# Patient Record
Sex: FEMALE | Race: WHITE | NOT HISPANIC OR LATINO | Employment: FULL TIME | URBAN - METROPOLITAN AREA
[De-identification: names, ages, dates, MRNs, and addresses within clinical notes are randomized per-mention and may not be internally consistent; named-entity substitution may affect disease eponyms.]

---

## 2017-09-14 ENCOUNTER — TRANSCRIBE ORDERS (OUTPATIENT)
Dept: ADMINISTRATIVE | Facility: HOSPITAL | Age: 27
End: 2017-09-14

## 2017-09-14 ENCOUNTER — HOSPITAL ENCOUNTER (OUTPATIENT)
Dept: RADIOLOGY | Facility: HOSPITAL | Age: 27
Discharge: HOME | End: 2017-09-14
Attending: FAMILY MEDICINE
Payer: COMMERCIAL

## 2017-09-14 DIAGNOSIS — M25.572 LEFT ANKLE PAIN, UNSPECIFIED CHRONICITY: Primary | ICD-10-CM

## 2017-09-14 DIAGNOSIS — M25.572 LEFT ANKLE PAIN, UNSPECIFIED CHRONICITY: ICD-10-CM

## 2017-09-14 PROCEDURE — 73630 X-RAY EXAM OF FOOT: CPT

## 2017-09-14 PROCEDURE — 73610 X-RAY EXAM OF ANKLE: CPT

## 2018-10-05 ENCOUNTER — APPOINTMENT (OUTPATIENT)
Dept: URGENT CARE | Facility: CLINIC | Age: 28
End: 2018-10-05

## 2018-10-05 DIAGNOSIS — Z02.1 PRE-EMPLOYMENT HEALTH SCREENING EXAMINATION: Primary | ICD-10-CM

## 2018-10-05 PROCEDURE — 86765 RUBEOLA ANTIBODY: CPT | Performed by: PHYSICIAN ASSISTANT

## 2018-10-05 PROCEDURE — 86480 TB TEST CELL IMMUN MEASURE: CPT | Performed by: PHYSICIAN ASSISTANT

## 2018-10-05 PROCEDURE — 86735 MUMPS ANTIBODY: CPT | Performed by: PHYSICIAN ASSISTANT

## 2018-10-05 PROCEDURE — 86762 RUBELLA ANTIBODY: CPT | Performed by: PHYSICIAN ASSISTANT

## 2018-10-06 LAB — RUBV IGG SERPL IA-ACNC: >175 IU/ML

## 2018-10-08 LAB
GAMMA INTERFERON BACKGROUND BLD IA-ACNC: 0.12 IU/ML
M TB IFN-G BLD-IMP: NEGATIVE
M TB IFN-G CD4+ BCKGRND COR BLD-ACNC: -0.02 IU/ML
M TB IFN-G CD4+ BCKGRND COR BLD-ACNC: 0.04 IU/ML
MITOGEN IGNF BCKGRD COR BLD-ACNC: >10 IU/ML

## 2018-10-09 LAB
MEV IGG SER QL: NORMAL
MUV IGG SER QL: NORMAL

## 2018-12-18 ENCOUNTER — OFFICE VISIT (OUTPATIENT)
Dept: URGENT CARE | Facility: CLINIC | Age: 28
End: 2018-12-18
Payer: COMMERCIAL

## 2018-12-18 VITALS
OXYGEN SATURATION: 98 % | TEMPERATURE: 100.8 F | SYSTOLIC BLOOD PRESSURE: 106 MMHG | DIASTOLIC BLOOD PRESSURE: 80 MMHG | WEIGHT: 130 LBS | RESPIRATION RATE: 16 BRPM | HEART RATE: 94 BPM

## 2018-12-18 DIAGNOSIS — J02.9 SORE THROAT: Primary | ICD-10-CM

## 2018-12-18 LAB — S PYO AG THROAT QL: NEGATIVE

## 2018-12-18 PROCEDURE — S9088 SERVICES PROVIDED IN URGENT: HCPCS | Performed by: NURSE PRACTITIONER

## 2018-12-18 PROCEDURE — 99203 OFFICE O/P NEW LOW 30 MIN: CPT | Performed by: NURSE PRACTITIONER

## 2018-12-18 PROCEDURE — 87070 CULTURE OTHR SPECIMN AEROBIC: CPT | Performed by: NURSE PRACTITIONER

## 2018-12-19 NOTE — PROGRESS NOTES
Assessment/Plan    Sore throat [J02 9]  1  Sore throat  POCT rapid strepA    Throat culture     - rapid strep is negative  - will send swab out for culture  - in the meantime to continue using OTC med for pain and lozenge such as Chloraseptic p r n   - will call if positive swab with prescription of antibiotic        Subjective:  Presents to clinic for sore throat     Patient ID: Maurizio Foreman is a 29 y o  female  Reason For Visit / Chief Complaint  Chief Complaint   Patient presents with    Sore Throat     c/o fatigue last week, scratchy throat over w/e; awoke this am to severe sore throat  Alt'd acetaminophen and ibuprofen RTC today         HPI  She states she has been having sore throat x1 week  He denies any nausea or vomiting  Has had mild fever  Temp at the clinic today 100 8 degrees  No other symptoms  Has used OTC med for pain  History reviewed  No pertinent past medical history  Past Surgical History:   Procedure Laterality Date    TONSILLECTOMY         History reviewed  No pertinent family history  Review of Systems   Constitutional: Positive for chills  Negative for fatigue  HENT: Positive for sore throat  Negative for congestion, postnasal drip, rhinorrhea and trouble swallowing  Respiratory: Negative for chest tightness, shortness of breath and wheezing  Cardiovascular: Negative for chest pain and palpitations  Objective:    /80   Pulse 94   Temp (!) 100 8 °F (38 2 °C) (Tympanic Core)   Resp 16   Wt 59 kg (130 lb)   SpO2 98%     Physical Exam   HENT:   Right Ear: External ear normal    Left Ear: External ear normal    Mouth/Throat: No oropharyngeal exudate  Mild to moderate erythema of the general throat area  No whitish exudate  Oral mucosa within normal limits  Cardiovascular: Normal rate, regular rhythm and normal heart sounds  Pulmonary/Chest: Effort normal and breath sounds normal  No respiratory distress     Lymphadenopathy:     She has no cervical adenopathy

## 2018-12-19 NOTE — PATIENT INSTRUCTIONS

## 2018-12-21 LAB — BACTERIA THROAT CULT: NORMAL

## 2018-12-31 ENCOUNTER — TELEPHONE (OUTPATIENT)
Dept: NEUROLOGY | Facility: CLINIC | Age: 28
End: 2018-12-31

## 2019-02-11 NOTE — PROGRESS NOTES
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 [employer] with a past medical history significant for hypothyroidism, bipolar versus anxiety/depression, chronic pain syndrome, insomnia referred here for evaluation of headache/migraines  She previously followed it [facility] and is switching insurances  Neurologic assessment reveals normal neurological exam     Chronic migraine without aura  She reports headaches/migraines since young age  She denies aura and reports typical associated migrainous features  Currently greater than 15 migraine days per month, lasting greater than 5 hours, typically up to a day      Has tried and failed multiple preventatives including:  - Inderal/propranolol 20 mg b i d - no benefit and caused hypotension  - Gabapentin- caused extreme irritability/anxiety  - Tegretol- helped for short time with mood but not migraines then stopped  - Trileptal- had a skin reaction/concern for [employer] versus allergy  - also of no benefit:  Butterbur, magnesium, riboflavin  - currently on and failed re migraines: topiramate, fluoxetine, olanzapine, bupropion  - had Botox from 2017 to 2018, had significant improvement with a decrease in over 10 migraines per month, now switched insurances, and migraines have gone back to greater than 15 days per month    Preventative:  - will plan for botox once prior auth complete  - discussed headache hygiene and lifestyle factors to try and improve headaches/migraines  - continue topiramate, will attempt to increase again gradually to 50 mg BID while waiting for Botox, increase in the past caused word-finding difficulty, patient aware, discussed other possible side effects and risks    Abortive:  - continue sumatriptan 100 mg, discussed proper use, possible side effects and risks  - she takes ibuprofen 600 mg b i d  Maximum 3 days a week  - future abortive options:  could try dexamethasone 2 mg p o  Daily with breakfast for 5 days, Depakote 500 mg p o  Q h s  For 3 nights followed by 250 mg for 2 nights  - past:  Rizatriptan-ineffective, Imitrex nasal spray-made her vomit, naproxen, Excedrin, Fioricet, Nucynta, Acetaminophen, Skelaxin        Patient instructions      - try and have MRI Brain results faxed to us     Headache/migraine treatment:   Abortive medications (for immediate treatment of a headache): It is ok to take ibuprofen, acetaminophen or naproxen (Advil, Tylenol,  Aleve, Excedrin) if they help your headaches you should limit these to No more than 3 times a week to avoid medication overuse/rebound headaches  For your more moderate to severe migraines take this medication early: Imitrex/sumatriptan 100 mg tabs   - take one at the onset of headache  May repeat one time after 1-2 hours if pain has not resolved  (Max 2 a day and 9 a month)     Prescription preventive medications for headaches/migraines   (to take every day to help prevent headaches - not to take at the time of headache):  [x] Increase to topiramate to 25 mg in am and 50 mg for 1 week and then increase to 50 mg BID     Self-Monitoring:  [x] Headache calendar  Each day jason a number from 0-10 indicating if there was a headache and how bad it was  This can be used to monitor gradual improvement and is helpful to make medication adjustments  You can do this on paper or there is an TOM for a smart phone called "Migraine e Diary"  Lifestyle Recommendations:  [x] SLEEP - Maintain a regular sleep schedule: Adults need at least 7-8 hours of uninterrupted a night   Maintain good sleep hygiene:  Going to bed and waking up at consistent times, avoiding excessive daytime naps, avoiding caffeinated beverages in the evening, avoid excessive stimulation in the evening and generally using bed primarily for sleeping  One hour before bedtime would recommend turning lights down lower, decreasing your activity (may read quietly, listen to music at a low volume)  When you get into bed, should eliminate all technology (no texting, emailing, playing with your phone, iPad or tablet in bed)  [x] HYDRATION - Maintain good hydration  Drink  2L of fluid a day (4 typical small water bottles)  [x] DIET - Maintain good nutrition  In particular don't skip meals and try and eat healthy balanced meals regularly  [x] TRIGGERS - Look for other triggers and avoid them: Limit caffeine to 1-2 cups a day or less  Avoid dietary triggers that you have noticed bring on your headaches (this could include aged cheese, peanuts, MSG, aspartame and nitrates)  [x] EXERCISE - physical exercise as we all know is good for you in many ways, and not only is good for your heart, but also is beneficial for your mental health, cognitive health and  chronic pain/headaches  I would encourage at the least 5 days of physical exercise weekly for at least 30 minutes  Education and Follow-up  [x] Please call with any questions or concerns  [x] Follow up with another provider for botox and then with me 6 weeks after for botox follow up     CC:   We had the pleasure of evaluating Maria Isabel Goodman in neurological consultation today  Janae Randolph is a 29 y o    left  handed female who presents today for evaluation of headaches  History of Present Illness:   Past medical history significant for hypothyroidism, bipolar 1, migraines, chronic pain syndrome, insomnia     What is your current pain level - 2    Headaches started at what age? 9years old  How often do the headaches occur? Greater than 15 migraine days per month  5 per week  What time of the day do the headaches start?  Wakes with  How long do the headaches last?  Greater than 5 hr, typically All day  Are you ever headache free? yes    Aura? without aura    Describe your usual headache pain quality? Throbbing, pulsating, pressure, searing, tight band, shooting, stabbing  Where is your headache located? Will start the central forehead -behind eye brows and temples (right or left) and may spread to the posterior head region-occipital and bilateral shoulders  What is the intensity of pain? 8  Associated symptoms:   [x] Nausea       [] Vomiting        [] Diarrhea  [] Insomnia    [] Stiff or sore neck   [x] Problems with concentration  [x] Photophobia     [x]Phonophobia      [] Osmophobia  [] Blurred vision   [x] Prefer quiet, dark room  [x] Light-headed or dizzy     [] Tinnitus   [x] Hands or feet tingle or feel numb/paresthesias  - not always associated with headache     [] Red ear      [] Ptosis      [] Facial droop  [] Lacrimation  [] Nasal congestion/rhinorrhea   [] Flushing of face  [] Change in pupil size      Number of days missed per month because of headaches:  Work (or school) days:  0, but occasionally needs to leave early  Social or Family activities: 4    Things that make the headache worse? Bearing down, concentrating to hard, clenching jaw    Headache triggers:  Menses, stress, too much / too little caffeine, weather changes, missing meals, exercise, related to sleep, fatigue  What time of the year do headaches occur more frequently?   do not seem to be related to any time of the year    Have you seen someone else for headaches or pain? Yes, C/ Darius Gonzalez 41   Have you had trigger point injection performed and how often? No  Have you had Botox injection performed and how often? Yes - see above  Have you had epidural injections or transforaminal injections performed? No  Have you used CBD or THC for your headaches and how often? No  Are you current pregnant or planning on getting pregnant? No, has mirena  Have you ever had any Brain imaging?   Last MRI Brain - 2014 - totally normal     What medications do you take or have you taken for your headaches? ABORTIVE:    Ibuprofen 600 mg - BID - 3 days a week   Sumatriptan 100 mg - 1-2 times a week     Past:    Rizatriptan 10 mg -ineffective  imitrex nasal spray made her vomit   Naproxen  Excedrin  Fioricet  Nucynta  Acetaminophen  Skelaxin    PREVENTIVE:     Topiramate 50 mg q h s -started 09/2016, 10/2017 increase to 25 mg q a m  -50 mg q p m  - had some word finding difficulty     Botox-starting 11/2017, last 11/29/2018 - improved significantly on botox in the past, decreased by over 10 migraines per month    Has never tried aimovig or other CGRPs  depakote has not tried     Past:    Inderal 20 b i d - no benefit and bottomed out BP  Butterbur-no benefit  B2, Magnesium - no benefit  Gabapentin-extreme irritability/anxiety  Tegretol-help for short time with mood then stopped  Trileptal-had a skin reaction/concern for [employer] versus allergy    Alternative therapies used in the past for headaches? physical therapy and massage no help    Neck pain?: chronic neck and shoulder tension     LIFESTYLE  Sleep -history of insomnia  - Ambien in the past   - sometimes benadryl   Problems falling asleep?:   Yes  Problems staying asleep?:  No    Physical activity: no time  Water: 2 glasses a day   Caffeine:  40 oz coffee each day, 12 oz of soda      Mood:   Bipolar 1?  Vs depression/anxiety, ADHD  - follows with psychiatrist, not counselor     Wellbutrin/bupropion 200 mg  Symbyax - Olanzapine and fluoxetine 3-25 mg  ADHD - adderall 30 mg       Past : Valium 5 mg    The following portions of the patient's history were reviewed and updated as appropriate: allergies, current medications, past family history, past medical history, past social history, past surgical history and problem list     Past Medical History:     Past Medical History:   Diagnosis Date    Migraines        Patient Active Problem List   Diagnosis    Bipolar 1 disorder, mixed ([facility] [address] )    Chronic migraine without aura with status migrainosus, not intractable    Chronic pain disorder    Menstrual migraine without status migrainosus, not intractable    Primary insomnia       Medications:      No current outpatient medications on file  No current facility-administered medications for this visit  Allergies: Allergies   Allergen Reactions    Oxcarbazepine Hives     Hives       Family History:   Family history of headaches:  migraine headaches in mother 2 maternal aunts, maternal grandma  Paternal aunt and grandmother   Any family history of aneurysms - No  parternal grandfather parkinsons  No family history on file      Social History:   Work: PA with Psychiatry   Education: PA [employer]  Lives with [employer]     Illicit Drugs: denies  Alcohol/tobacco: Denies tobacco use, alcohol intake: social drinker    Social History     Socioeconomic History    Marital status: Single     Spouse name: Not on file    Number of children: Not on file    Years of education: Not on file    Highest education level: Not on file   Occupational History    Not on file   Social Needs    Financial resource strain: Not on file    Food insecurity:     Worry: Not on file     Inability: Not on file    Transportation needs:     Medical: Not on file     Non-medical: Not on file   Tobacco Use    Smoking status: Former Smoker     Types: E-Cigarettes, Cigarettes    Smokeless tobacco: Never Used   Substance and Sexual Activity    Alcohol use: Not on file    Drug use: Not on file    Sexual activity: Not on file   Lifestyle    Physical activity:     Days per week: Not on file     Minutes per session: Not on file    Stress: Not on file   Relationships    Social connections:     Talks on phone: Not on file     Gets together: Not on file     Attends [employer] service: Not on file     Active member of club or organization: Not on file     Attends meetings of clubs or organizations: Not on file     Relationship status: Not on file    Intimate partner violence:     Fear of current or ex partner: Not on file     Emotionally abused: Not on file     Physically abused: Not on file     Forced sexual activity: Not on file   Other Topics Concern    Not on file   Social History Narrative    Not on file         Objective:     Physical Exam:                                                                 Vitals:            Constitutional:    /68 (BP Location: Left arm, Patient Position: Sitting, Cuff Size: Standard)   Pulse 95   Ht 5' (1 524 m)   Wt 59 9 kg (132 lb)   BMI 25 78 kg/m²   BP Readings from Last 3 Encounters:   02/12/19 110/68   12/18/18 106/80     Pulse Readings from Last 3 Encounters:   02/12/19 95   12/18/18 94         Well developed, well nourished, well groomed  No dysmorphic features  HEENT:  Normocephalic atraumatic  No meningismus  Oropharynx is clear and moist  No oral mucosal lesions  Chest:  Respirations regular and unlabored  Cardiovascular:  Regular rate, intact distal pulses  Distal extremities warm without palpable edema or tenderness, no observed significant swelling  Musculoskeletal:  Full range of motion  (see below under neurologic exam for evaluation of motor function and gait)   Skin:  warm and dry, not diaphoretic  No apparent birthmarks or stigmata of neurocutaneous disease  Psychiatric:  Normal behavior and appropriate affect        Neurological Examination:     Mental status/cognitive function:   Orientated to time, place and person  Recent and remote memory intact  Attention span and concentration as well as fund of knowledge are appropriate for age  Normal language and spontaneous speech  Cranial Nerves:  II-visual fields full  Fundi poorly visualized due to pupillary constriction (1/2019 - dilated fundus exam normal)  III, IV, VI-Pupils were equal, round, and reactive to light and accomodation   Extraocular movements were full and conjugate without nystagmus  conjugate gaze, normal smooth pursuits, normal saccades   V-facial sensation symmetric  VII-facial expression symmetric, intact forehead wrinkle, strong eye closure, symmetric smile    VIII-hearing grossly intact bilaterally   IX, X-palate elevation symmetric, no dysarthria  XI-shoulder shrug strength intact    XII-tongue protrusion midline  Motor Exam: symmetric bulk and tone throughout, no pronator drift  Power/strength 5/5 bilateral upper and lower extremities, no atrophy, fasciculations or abnormal movements noted  Sensory: grossly intact light touch in all extremities  Reflexes: brachioradialis 2+, biceps 2+, knee 3+ (symmetric), ankle 2+ bilaterally  No ankle clonus  Coordination: Finger nose finger intact bilaterally, no apparent dysmetria, ataxia or tremor noted  Gait: steady casual and tandem gait  Romberg Negative  Pertinent lab results:    No routine labs in system  Per patient Basic labs 11 or 12/2018 - normal, including LFTs    Imaging:   No head imaging in system  Last MRI Brain 2014 normal per patient        Review of Systems:   ROS obtained by medical assistant Personally reviewed and updated if indicated  Review of Systems   Constitutional: Negative  Negative for appetite change and fever  HENT: Negative  Negative for hearing loss, tinnitus, trouble swallowing and voice change  Eyes: Negative for photophobia and pain  Eye pain   Respiratory: Negative  Negative for shortness of breath  Cardiovascular: Negative  Negative for palpitations  Gastrointestinal: Negative  Negative for nausea and vomiting  Endocrine: Negative  Negative for cold intolerance and heat intolerance  Genitourinary: Positive for frequency and urgency  Negative for dysuria  Musculoskeletal: Positive for back pain, myalgias and neck pain  Joint pain  Pain while walking   Skin: Negative  Negative for rash     Neurological: Positive for dizziness, numbness and headaches  Negative for tremors, seizures, syncope, facial asymmetry, speech difficulty, weakness and light-headedness  Memory problem  Facial numbness  Twitching  Trouble falling asleep  Balance problem  Clumsiness  confusion   Hematological: Bruises/bleeds easily  Psychiatric/Behavioral: Negative  Negative for confusion, hallucinations and sleep disturbance  I have spent 60 minutes with Patient  today in which greater than 50% of this time was spent in counseling/coordination of care regarding Prognosis, Risks and benefits of tx options, Intructions for management, Importance of tx compliance, Risk factor reductions and Impressions        Author:  Cait Simms MD 2/12/2019 8:32 AM

## 2019-02-12 ENCOUNTER — OFFICE VISIT (OUTPATIENT)
Dept: NEUROLOGY | Facility: CLINIC | Age: 29
End: 2019-02-12
Payer: COMMERCIAL

## 2019-02-12 VITALS
HEIGHT: 60 IN | SYSTOLIC BLOOD PRESSURE: 110 MMHG | BODY MASS INDEX: 25.91 KG/M2 | WEIGHT: 132 LBS | HEART RATE: 95 BPM | DIASTOLIC BLOOD PRESSURE: 68 MMHG

## 2019-02-12 DIAGNOSIS — G43.709 CHRONIC MIGRAINE WITHOUT AURA WITHOUT STATUS MIGRAINOSUS, NOT INTRACTABLE: Primary | ICD-10-CM

## 2019-02-12 PROBLEM — G43.829 MENSTRUAL MIGRAINE WITHOUT STATUS MIGRAINOSUS, NOT INTRACTABLE: Status: ACTIVE | Noted: 2017-01-04

## 2019-02-12 PROCEDURE — 99244 OFF/OP CNSLTJ NEW/EST MOD 40: CPT | Performed by: PSYCHIATRY & NEUROLOGY

## 2019-02-12 RX ORDER — TOPIRAMATE 50 MG/1
TABLET, FILM COATED ORAL
COMMUNITY
Start: 2019-01-08 | End: 2019-02-12 | Stop reason: SDUPTHER

## 2019-02-12 RX ORDER — SUMATRIPTAN 100 MG/1
100 TABLET, FILM COATED ORAL ONCE AS NEEDED
Qty: 27 TABLET | Refills: 1 | Status: SHIPPED | OUTPATIENT
Start: 2019-02-12 | End: 2019-10-23 | Stop reason: SDUPTHER

## 2019-02-12 RX ORDER — TOPIRAMATE 50 MG/1
TABLET, FILM COATED ORAL
Qty: 180 TABLET | Refills: 1 | Status: SHIPPED | OUTPATIENT
Start: 2019-02-12 | End: 2019-05-15 | Stop reason: SDUPTHER

## 2019-02-12 RX ORDER — BUPROPION HYDROCHLORIDE 300 MG/1
300 TABLET ORAL
COMMUNITY
End: 2019-05-14 | Stop reason: ALTCHOICE

## 2019-02-12 RX ORDER — LEVOTHYROXINE SODIUM 0.07 MG/1
TABLET ORAL
COMMUNITY
Start: 2019-01-28 | End: 2019-05-14 | Stop reason: ALTCHOICE

## 2019-02-12 RX ORDER — DEXTROAMPHETAMINE SACCHARATE, AMPHETAMINE ASPARTATE MONOHYDRATE, DEXTROAMPHETAMINE SULFATE AND AMPHETAMINE SULFATE 7.5; 7.5; 7.5; 7.5 MG/1; MG/1; MG/1; MG/1
CAPSULE, EXTENDED RELEASE ORAL
COMMUNITY
Start: 2019-02-11 | End: 2020-03-27 | Stop reason: ALTCHOICE

## 2019-02-12 RX ORDER — SUMATRIPTAN 100 MG/1
TABLET, FILM COATED ORAL
COMMUNITY
Start: 2019-01-08 | End: 2019-02-12

## 2019-02-12 RX ORDER — OLANZAPINE AND FLUOXETINE 3; 25 MG/1; MG/1
1 CAPSULE ORAL EVERY EVENING
Status: ON HOLD | COMMUNITY
End: 2022-06-01

## 2019-02-12 RX ORDER — OMEGA-3 FATTY ACIDS/FISH OIL 300-1000MG
800 CAPSULE ORAL EVERY 6 HOURS PRN
Status: ON HOLD | COMMUNITY
Start: 2006-08-23 | End: 2022-06-01

## 2019-02-12 NOTE — PROGRESS NOTES
Review of Systems   Constitutional: Negative  Negative for appetite change and fever  HENT: Negative  Negative for hearing loss, tinnitus, trouble swallowing and voice change  Eyes: Negative for photophobia and pain  Eye pain   Respiratory: Negative  Negative for shortness of breath  Cardiovascular: Negative  Negative for palpitations  Gastrointestinal: Negative  Negative for nausea and vomiting  Endocrine: Negative  Negative for cold intolerance and heat intolerance  Genitourinary: Positive for frequency and urgency  Negative for dysuria  Musculoskeletal: Positive for back pain, myalgias and neck pain  Joint pain  Pain while walking   Skin: Negative  Negative for rash  Neurological: Positive for dizziness, numbness and headaches  Negative for tremors, seizures, syncope, facial asymmetry, speech difficulty, weakness and light-headedness  Memory problem  Facial numbness  Twitching  Trouble falling asleep  Balance problem  Clumsiness  confusion   Hematological: Bruises/bleeds easily  Psychiatric/Behavioral: Negative  Negative for confusion, hallucinations and sleep disturbance

## 2019-02-12 NOTE — PATIENT INSTRUCTIONS
- try and have MRI Brain results faxed to us     Headache/migraine treatment:   Abortive medications (for immediate treatment of a headache): It is ok to take ibuprofen, acetaminophen or naproxen (Advil, Tylenol,  Aleve, Excedrin) if they help your headaches you should limit these to No more than 3 times a week to avoid medication overuse/rebound headaches  For your more moderate to severe migraines take this medication early: Imitrex/sumatriptan 100 mg tabs   - take one at the onset of headache  May repeat one time after 1-2 hours if pain has not resolved  (Max 2 a day and 9 a month)     Prescription preventive medications for headaches/migraines   (to take every day to help prevent headaches - not to take at the time of headache):  [x] Increase to topiramate to 25 mg in am and 50 mg for 1 week and then increase to 50 mg BID     Self-Monitoring:  [x] Headache calendar  Each day jason a number from 0-10 indicating if there was a headache and how bad it was  This can be used to monitor gradual improvement and is helpful to make medication adjustments  You can do this on paper or there is an TOM for a smart phone called "Migraine e Diary"  Lifestyle Recommendations:  [x] SLEEP - Maintain a regular sleep schedule: Adults need at least 7-8 hours of uninterrupted a night  Maintain good sleep hygiene:  Going to bed and waking up at consistent times, avoiding excessive daytime naps, avoiding caffeinated beverages in the evening, avoid excessive stimulation in the evening and generally using bed primarily for sleeping  One hour before bedtime would recommend turning lights down lower, decreasing your activity (may read quietly, listen to music at a low volume)  When you get into bed, should eliminate all technology (no texting, emailing, playing with your phone, iPad or tablet in bed)  [x] HYDRATION - Maintain good hydration    Drink  2L of fluid a day (4 typical small water bottles)  [x] DIET - Maintain good nutrition  In particular don't skip meals and try and eat healthy balanced meals regularly  [x] TRIGGERS - Look for other triggers and avoid them: Limit caffeine to 1-2 cups a day or less  Avoid dietary triggers that you have noticed bring on your headaches (this could include aged cheese, peanuts, MSG, aspartame and nitrates)  [x] EXERCISE - physical exercise as we all know is good for you in many ways, and not only is good for your heart, but also is beneficial for your mental health, cognitive health and  chronic pain/headaches  I would encourage at the least 5 days of physical exercise weekly for at least 30 minutes  Education and Follow-up  [x] Please call with any questions or concerns     [x] Follow up with another provider for botox and then with me 6 weeks after for botox follow up

## 2019-02-13 ENCOUNTER — TELEPHONE (OUTPATIENT)
Dept: NEUROLOGY | Facility: CLINIC | Age: 29
End: 2019-02-13

## 2019-02-13 NOTE — TELEPHONE ENCOUNTER
----- Message from Radha Mathews sent at 2019  9:54 AM EST -----  Regarding: reschedule  Hi :)    Please reschedule patient with Dr Alecia Elder  Patient is a new start botox      Please advise    Thank you

## 2019-02-13 NOTE — TELEPHONE ENCOUNTER
Called patient lmom awaiting a call back- I was calling to reschedule the patient's Botox appointment  Patient is a new start and will need to be scheduled with Dr Juan Miguel Flores  Can offer the patient an appointment on 3/27/19  When patient calls back I can schedule- [place]

## 2019-02-18 NOTE — TELEPHONE ENCOUNTER
Left message for patient to call back to [facility]ressa Antoine  Spots held on 3/13/19 in [facility]

## 2019-02-18 NOTE — TELEPHONE ENCOUNTER
Referral Notes   Number of Notes: 1   Type Date User Summary Attachment   General 02/18/2019  8:46 AM Jennifer Rios care coordination -   Note    Auth # [id number] valid till 10/01/2019     Please use our stock      Thank you

## 2019-02-28 NOTE — TELEPHONE ENCOUNTER
Patient called back and changed appt for 3/27/19 at 330 pm in [facility] location  Mailing appt card to home address  Patient very thankful for a sooner appt

## 2019-03-05 ENCOUNTER — TELEPHONE (OUTPATIENT)
Dept: NEUROLOGY | Facility: CLINIC | Age: 29
End: 2019-03-05

## 2019-03-05 NOTE — TELEPHONE ENCOUNTER
Called patient and left a message to call back to reschedule her Botox appointment  Since the insurance referral starts 04/01/19 we can not schedule before then  I have 05/01/19 on hold in [facility]

## 2019-03-15 NOTE — TELEPHONE ENCOUNTER
Called patient and left a message informing her of her appointment on 04/17/19 at 3:30pm in the [facility] Location with Dr Zayda Cruz  I will also mail out an appointment card as well

## 2019-03-25 NOTE — TELEPHONE ENCOUNTER
Available spots with Dr Susi Rogel next week  Called patient to offer sooner BINJ  Appt on 4/3/19 and 4/4/19 at 330 pm in [facility] held for this patient  Left message for her to call ASAP to change appt

## 2019-04-03 ENCOUNTER — PROCEDURE VISIT (OUTPATIENT)
Dept: NEUROLOGY | Facility: CLINIC | Age: 29
End: 2019-04-03
Payer: COMMERCIAL

## 2019-04-03 VITALS — DIASTOLIC BLOOD PRESSURE: 74 MMHG | HEART RATE: 76 BPM | TEMPERATURE: 97.7 F | SYSTOLIC BLOOD PRESSURE: 124 MMHG

## 2019-04-03 DIAGNOSIS — G43.701 CHRONIC MIGRAINE WITHOUT AURA WITH STATUS MIGRAINOSUS, NOT INTRACTABLE: Primary | ICD-10-CM

## 2019-04-03 PROCEDURE — 64615 CHEMODENERV MUSC MIGRAINE: CPT | Performed by: PSYCHIATRY & NEUROLOGY

## 2019-05-13 ENCOUNTER — TELEPHONE (OUTPATIENT)
Dept: NEUROLOGY | Facility: CLINIC | Age: 29
End: 2019-05-13

## 2019-05-13 RX ORDER — BUPROPION HYDROCHLORIDE 150 MG/1
150 TABLET, EXTENDED RELEASE ORAL 2 TIMES DAILY
COMMUNITY
Start: 2019-04-08

## 2019-05-13 RX ORDER — FLUCONAZOLE 150 MG/1
TABLET ORAL
COMMUNITY
Start: 2019-03-18 | End: 2019-05-14 | Stop reason: ALTCHOICE

## 2019-05-13 RX ORDER — DIAZEPAM 5 MG/1
5 TABLET ORAL EVERY 6 HOURS PRN
COMMUNITY
Start: 2019-04-20

## 2019-05-14 ENCOUNTER — OFFICE VISIT (OUTPATIENT)
Dept: FAMILY MEDICINE CLINIC | Facility: CLINIC | Age: 29
End: 2019-05-14
Payer: COMMERCIAL

## 2019-05-14 VITALS
HEIGHT: 60 IN | DIASTOLIC BLOOD PRESSURE: 70 MMHG | WEIGHT: 127.6 LBS | HEART RATE: 68 BPM | BODY MASS INDEX: 25.05 KG/M2 | SYSTOLIC BLOOD PRESSURE: 120 MMHG

## 2019-05-14 DIAGNOSIS — K58.1 IRRITABLE BOWEL SYNDROME WITH CONSTIPATION: ICD-10-CM

## 2019-05-14 DIAGNOSIS — R73.9 HYPERGLYCEMIA: ICD-10-CM

## 2019-05-14 DIAGNOSIS — E03.9 ACQUIRED HYPOTHYROIDISM: Primary | ICD-10-CM

## 2019-05-14 DIAGNOSIS — E78.2 MIXED HYPERLIPIDEMIA: ICD-10-CM

## 2019-05-14 PROCEDURE — 99203 OFFICE O/P NEW LOW 30 MIN: CPT | Performed by: PHYSICIAN ASSISTANT

## 2019-05-14 RX ORDER — FEXOFENADINE HCL 180 MG/1
180 TABLET ORAL DAILY
Status: ON HOLD | COMMUNITY
End: 2022-06-01

## 2019-05-14 RX ORDER — POLYETHYLENE GLYCOL 3350 17 G/17G
17 POWDER, FOR SOLUTION ORAL DAILY
COMMUNITY

## 2019-05-14 RX ORDER — LEVOTHYROXINE SODIUM 0.1 MG/1
TABLET ORAL
COMMUNITY
End: 2019-05-31 | Stop reason: SDUPTHER

## 2019-05-15 ENCOUNTER — OFFICE VISIT (OUTPATIENT)
Dept: NEUROLOGY | Facility: CLINIC | Age: 29
End: 2019-05-15
Payer: COMMERCIAL

## 2019-05-15 VITALS
WEIGHT: 127 LBS | HEIGHT: 60 IN | DIASTOLIC BLOOD PRESSURE: 74 MMHG | HEART RATE: 106 BPM | BODY MASS INDEX: 24.94 KG/M2 | SYSTOLIC BLOOD PRESSURE: 128 MMHG

## 2019-05-15 DIAGNOSIS — G44.86 CERVICOGENIC HEADACHE: Primary | ICD-10-CM

## 2019-05-15 DIAGNOSIS — G43.701 CHRONIC MIGRAINE WITHOUT AURA WITH STATUS MIGRAINOSUS, NOT INTRACTABLE: ICD-10-CM

## 2019-05-15 DIAGNOSIS — F31.60 BIPOLAR 1 DISORDER, MIXED (HCC): ICD-10-CM

## 2019-05-15 DIAGNOSIS — G43.709 CHRONIC MIGRAINE WITHOUT AURA WITHOUT STATUS MIGRAINOSUS, NOT INTRACTABLE: ICD-10-CM

## 2019-05-15 DIAGNOSIS — G43.829 MENSTRUAL MIGRAINE WITHOUT STATUS MIGRAINOSUS, NOT INTRACTABLE: ICD-10-CM

## 2019-05-15 PROCEDURE — 99215 OFFICE O/P EST HI 40 MIN: CPT | Performed by: PHYSICIAN ASSISTANT

## 2019-05-15 RX ORDER — KETOROLAC TROMETHAMINE 10 MG/1
10 TABLET, FILM COATED ORAL EVERY 6 HOURS PRN
Qty: 10 TABLET | Refills: 0 | Status: SHIPPED | OUTPATIENT
Start: 2019-05-15 | End: 2020-02-04

## 2019-05-15 RX ORDER — CYCLOBENZAPRINE HCL 5 MG
10 TABLET ORAL
Qty: 180 TABLET | Refills: 0 | Status: SHIPPED | OUTPATIENT
Start: 2019-05-15 | End: 2019-10-23 | Stop reason: SDUPTHER

## 2019-05-15 RX ORDER — DEXAMETHASONE 1 MG
1 TABLET ORAL
Qty: 10 TABLET | Refills: 0 | Status: SHIPPED | OUTPATIENT
Start: 2019-05-15 | End: 2020-02-04

## 2019-05-15 RX ORDER — TOPIRAMATE 50 MG/1
50 TABLET, FILM COATED ORAL 2 TIMES DAILY
Qty: 180 TABLET | Refills: 1 | Status: SHIPPED | OUTPATIENT
Start: 2019-05-15 | End: 2019-10-23 | Stop reason: SDUPTHER

## 2019-05-15 RX ORDER — PROCHLORPERAZINE MALEATE 10 MG
10 TABLET ORAL EVERY 6 HOURS PRN
Qty: 10 TABLET | Refills: 0 | Status: SHIPPED | OUTPATIENT
Start: 2019-05-15 | End: 2020-03-27 | Stop reason: ALTCHOICE

## 2019-05-17 ENCOUNTER — OFFICE VISIT (OUTPATIENT)
Dept: OBGYN CLINIC | Facility: CLINIC | Age: 29
End: 2019-05-17
Payer: COMMERCIAL

## 2019-05-17 VITALS
SYSTOLIC BLOOD PRESSURE: 106 MMHG | WEIGHT: 126.4 LBS | HEIGHT: 60 IN | BODY MASS INDEX: 24.81 KG/M2 | DIASTOLIC BLOOD PRESSURE: 58 MMHG

## 2019-05-17 DIAGNOSIS — Z01.419 ENCOUNTER FOR GYNECOLOGICAL EXAMINATION (GENERAL) (ROUTINE) WITHOUT ABNORMAL FINDINGS: Primary | ICD-10-CM

## 2019-05-17 DIAGNOSIS — Z12.4 SCREENING FOR CERVICAL CANCER: ICD-10-CM

## 2019-05-17 PROCEDURE — G0145 SCR C/V CYTO,THINLAYER,RESCR: HCPCS | Performed by: OBSTETRICS & GYNECOLOGY

## 2019-05-17 PROCEDURE — 99385 PREV VISIT NEW AGE 18-39: CPT | Performed by: OBSTETRICS & GYNECOLOGY

## 2019-05-18 ENCOUNTER — APPOINTMENT (OUTPATIENT)
Dept: LAB | Facility: CLINIC | Age: 29
End: 2019-05-18
Payer: COMMERCIAL

## 2019-05-18 LAB
ALBUMIN SERPL BCP-MCNC: 3.6 G/DL (ref 3.5–5)
ALP SERPL-CCNC: 43 U/L (ref 46–116)
ALT SERPL W P-5'-P-CCNC: 17 U/L (ref 12–78)
ANION GAP SERPL CALCULATED.3IONS-SCNC: 5 MMOL/L (ref 4–13)
AST SERPL W P-5'-P-CCNC: 11 U/L (ref 5–45)
BASOPHILS # BLD AUTO: 0.04 THOUSANDS/ΜL (ref 0–0.1)
BASOPHILS NFR BLD AUTO: 1 % (ref 0–1)
BILIRUB SERPL-MCNC: 0.3 MG/DL (ref 0.2–1)
BUN SERPL-MCNC: 10 MG/DL (ref 5–25)
CALCIUM SERPL-MCNC: 8.2 MG/DL (ref 8.3–10.1)
CHLORIDE SERPL-SCNC: 113 MMOL/L (ref 100–108)
CHOLEST SERPL-MCNC: 186 MG/DL (ref 50–200)
CO2 SERPL-SCNC: 24 MMOL/L (ref 21–32)
CREAT SERPL-MCNC: 0.95 MG/DL (ref 0.6–1.3)
EOSINOPHIL # BLD AUTO: 0.16 THOUSAND/ΜL (ref 0–0.61)
EOSINOPHIL NFR BLD AUTO: 3 % (ref 0–6)
ERYTHROCYTE [DISTWIDTH] IN BLOOD BY AUTOMATED COUNT: 11.5 % (ref 11.6–15.1)
EST. AVERAGE GLUCOSE BLD GHB EST-MCNC: 91 MG/DL
GFR SERPL CREATININE-BSD FRML MDRD: 81 ML/MIN/1.73SQ M
GLUCOSE P FAST SERPL-MCNC: 83 MG/DL (ref 65–99)
HBA1C MFR BLD: 4.8 % (ref 4.2–6.3)
HCT VFR BLD AUTO: 37.8 % (ref 34.8–46.1)
HDLC SERPL-MCNC: 58 MG/DL (ref 40–60)
HGB BLD-MCNC: 13 G/DL (ref 11.5–15.4)
IMM GRANULOCYTES # BLD AUTO: 0.02 THOUSAND/UL (ref 0–0.2)
IMM GRANULOCYTES NFR BLD AUTO: 0 % (ref 0–2)
LDLC SERPL CALC-MCNC: 118 MG/DL (ref 0–100)
LYMPHOCYTES # BLD AUTO: 1.8 THOUSANDS/ΜL (ref 0.6–4.47)
LYMPHOCYTES NFR BLD AUTO: 36 % (ref 14–44)
MCH RBC QN AUTO: 31.4 PG (ref 26.8–34.3)
MCHC RBC AUTO-ENTMCNC: 34.4 G/DL (ref 31.4–37.4)
MCV RBC AUTO: 91 FL (ref 82–98)
MONOCYTES # BLD AUTO: 0.33 THOUSAND/ΜL (ref 0.17–1.22)
MONOCYTES NFR BLD AUTO: 7 % (ref 4–12)
NEUTROPHILS # BLD AUTO: 2.6 THOUSANDS/ΜL (ref 1.85–7.62)
NEUTS SEG NFR BLD AUTO: 53 % (ref 43–75)
NONHDLC SERPL-MCNC: 128 MG/DL
NRBC BLD AUTO-RTO: 0 /100 WBCS
PLATELET # BLD AUTO: 225 THOUSANDS/UL (ref 149–390)
PMV BLD AUTO: 12.4 FL (ref 8.9–12.7)
POTASSIUM SERPL-SCNC: 4 MMOL/L (ref 3.5–5.3)
PROT SERPL-MCNC: 6.5 G/DL (ref 6.4–8.2)
RBC # BLD AUTO: 4.14 MILLION/UL (ref 3.81–5.12)
SODIUM SERPL-SCNC: 142 MMOL/L (ref 136–145)
T4 FREE SERPL-MCNC: 0.94 NG/DL (ref 0.76–1.46)
TRIGL SERPL-MCNC: 52 MG/DL
TSH SERPL DL<=0.05 MIU/L-ACNC: 4.91 UIU/ML (ref 0.36–3.74)
WBC # BLD AUTO: 4.95 THOUSAND/UL (ref 4.31–10.16)

## 2019-05-18 PROCEDURE — 85025 COMPLETE CBC W/AUTO DIFF WBC: CPT | Performed by: PHYSICIAN ASSISTANT

## 2019-05-18 PROCEDURE — 83036 HEMOGLOBIN GLYCOSYLATED A1C: CPT | Performed by: PHYSICIAN ASSISTANT

## 2019-05-18 PROCEDURE — 80061 LIPID PANEL: CPT | Performed by: PHYSICIAN ASSISTANT

## 2019-05-18 PROCEDURE — 36415 COLL VENOUS BLD VENIPUNCTURE: CPT | Performed by: PHYSICIAN ASSISTANT

## 2019-05-18 PROCEDURE — 84439 ASSAY OF FREE THYROXINE: CPT | Performed by: PHYSICIAN ASSISTANT

## 2019-05-18 PROCEDURE — 84443 ASSAY THYROID STIM HORMONE: CPT | Performed by: PHYSICIAN ASSISTANT

## 2019-05-18 PROCEDURE — 80053 COMPREHEN METABOLIC PANEL: CPT | Performed by: PHYSICIAN ASSISTANT

## 2019-05-20 ENCOUNTER — TELEPHONE (OUTPATIENT)
Dept: OBGYN CLINIC | Facility: CLINIC | Age: 29
End: 2019-05-20

## 2019-05-21 DIAGNOSIS — E83.51 HYPOCALCEMIA: ICD-10-CM

## 2019-05-21 DIAGNOSIS — E03.9 ACQUIRED HYPOTHYROIDISM: Primary | ICD-10-CM

## 2019-05-23 LAB
LAB AP GYN PRIMARY INTERPRETATION: NORMAL
Lab: NORMAL

## 2019-05-28 ENCOUNTER — TELEPHONE (OUTPATIENT)
Dept: OBGYN CLINIC | Facility: CLINIC | Age: 29
End: 2019-05-28

## 2019-05-29 ENCOUNTER — PROCEDURE VISIT (OUTPATIENT)
Dept: OBGYN CLINIC | Facility: CLINIC | Age: 29
End: 2019-05-29
Payer: COMMERCIAL

## 2019-05-29 VITALS — DIASTOLIC BLOOD PRESSURE: 66 MMHG | WEIGHT: 127.8 LBS | SYSTOLIC BLOOD PRESSURE: 112 MMHG | BODY MASS INDEX: 24.96 KG/M2

## 2019-05-29 DIAGNOSIS — L70.9 ACNE, UNSPECIFIED ACNE TYPE: ICD-10-CM

## 2019-05-29 DIAGNOSIS — Z30.433 ENCOUNTER FOR IUD REMOVAL AND REINSERTION: Primary | ICD-10-CM

## 2019-05-29 DIAGNOSIS — Z32.02 PREGNANCY TEST NEGATIVE: ICD-10-CM

## 2019-05-29 LAB — SL AMB POCT URINE HCG: NORMAL

## 2019-05-29 PROCEDURE — 58301 REMOVE INTRAUTERINE DEVICE: CPT | Performed by: PHYSICIAN ASSISTANT

## 2019-05-29 PROCEDURE — 58300 INSERT INTRAUTERINE DEVICE: CPT | Performed by: PHYSICIAN ASSISTANT

## 2019-05-29 PROCEDURE — 81025 URINE PREGNANCY TEST: CPT | Performed by: PHYSICIAN ASSISTANT

## 2019-05-29 RX ORDER — SPIRONOLACTONE 50 MG/1
50 TABLET, FILM COATED ORAL DAILY
Qty: 90 TABLET | Refills: 12 | Status: SHIPPED | OUTPATIENT
Start: 2019-05-29 | End: 2020-02-04

## 2019-05-29 RX ORDER — SPIRONOLACTONE 50 MG/1
50 TABLET, FILM COATED ORAL DAILY
Qty: 90 TABLET | Refills: 12 | Status: SHIPPED | OUTPATIENT
Start: 2019-05-29 | End: 2019-05-29 | Stop reason: SDUPTHER

## 2019-05-31 DIAGNOSIS — E03.9 ACQUIRED HYPOTHYROIDISM: Primary | ICD-10-CM

## 2019-05-31 RX ORDER — LEVOTHYROXINE SODIUM 0.1 MG/1
100 TABLET ORAL DAILY
Qty: 90 TABLET | Refills: 0 | Status: SHIPPED | OUTPATIENT
Start: 2019-05-31 | End: 2019-09-12 | Stop reason: SDUPTHER

## 2019-06-24 ENCOUNTER — DOCUMENTATION (OUTPATIENT)
Dept: NEUROLOGY | Facility: CLINIC | Age: 29
End: 2019-06-24

## 2019-07-10 ENCOUNTER — OFFICE VISIT (OUTPATIENT)
Dept: OBGYN CLINIC | Facility: CLINIC | Age: 29
End: 2019-07-10
Payer: COMMERCIAL

## 2019-07-10 VITALS — DIASTOLIC BLOOD PRESSURE: 70 MMHG | BODY MASS INDEX: 25.55 KG/M2 | SYSTOLIC BLOOD PRESSURE: 108 MMHG | WEIGHT: 130.8 LBS

## 2019-07-10 DIAGNOSIS — Z30.431 INTRAUTERINE DEVICE SURVEILLANCE: Primary | ICD-10-CM

## 2019-07-10 PROCEDURE — 99212 OFFICE O/P EST SF 10 MIN: CPT | Performed by: PHYSICIAN ASSISTANT

## 2019-07-10 NOTE — PROGRESS NOTES
Pt here for string check following Kyleena insertion  She has had some irregular spotting but no period since  She has no questions or concerns

## 2019-07-10 NOTE — PROGRESS NOTES
Assessment/Plan:    Intrauterine device surveillance  IUD strings visible  To call w/ any heavy bleeding, sig pelvic pain, expulsion of device  F/u PRN         Diagnoses and all orders for this visit:    Intrauterine device surveillance        Subjective:      Patient ID: Pamella Veras is a 34 y o  female  Pt presents for IUD surveillance  S/p Beckey [place] insertion (changed from [place] IUD)  denies any irreg bleeding, cramping   Happy w/ this contra option and wishes to continue          The following portions of the patient's history were reviewed and updated as appropriate: allergies, current medications, past family history, past medical history, past social history, past surgical history and problem list     Review of Systems   Constitutional: Negative for appetite change, fatigue and unexpected weight change  Respiratory: Negative for chest tightness and shortness of breath  Cardiovascular: Negative for chest pain, palpitations and leg swelling  Gastrointestinal: Negative for abdominal pain, constipation, diarrhea, nausea and vomiting  Genitourinary: Negative for difficulty urinating, dyspareunia, menstrual problem, pelvic pain and vaginal discharge  Musculoskeletal: Negative for arthralgias and myalgias  Neurological: Negative for dizziness, light-headedness and headaches  Psychiatric/Behavioral: Negative for dysphoric mood  The patient is not nervous/anxious  All other systems reviewed and are negative  Objective:      /70 (BP Location: Right arm, Patient Position: Sitting, Cuff Size: Standard)   Wt 59 3 kg (130 lb 12 8 oz)   BMI 25 55 kg/m²          Physical Exam   Constitutional: She is oriented to person, place, and time  She appears well-developed and well-nourished  HENT:   Head: Normocephalic and atraumatic  Abdominal: Soft  There is no tenderness  Hernia confirmed negative in the right inguinal area and confirmed negative in the left inguinal area     Genitourinary: Vagina normal and uterus normal  Pelvic exam was performed with patient supine  There is no rash, tenderness, lesion or injury on the right labia  There is no rash, tenderness, lesion or injury on the left labia  Cervix exhibits no motion tenderness, no discharge and no friability  Right adnexum displays no mass, no tenderness and no fullness  Left adnexum displays no mass, no tenderness and no fullness  No erythema, tenderness or bleeding in the vagina  No signs of injury around the vagina  No vaginal discharge found  Genitourinary Comments: IUD strings visible   Neurological: She is alert and oriented to person, place, and time  Skin: Skin is warm and dry  Psychiatric: She has a normal mood and affect  Nursing note and vitals reviewed

## 2019-07-17 ENCOUNTER — PROCEDURE VISIT (OUTPATIENT)
Dept: NEUROLOGY | Facility: CLINIC | Age: 29
End: 2019-07-17
Payer: COMMERCIAL

## 2019-07-17 VITALS — TEMPERATURE: 99.6 F | SYSTOLIC BLOOD PRESSURE: 134 MMHG | HEART RATE: 106 BPM | DIASTOLIC BLOOD PRESSURE: 78 MMHG

## 2019-07-17 DIAGNOSIS — G43.701 CHRONIC MIGRAINE WITHOUT AURA WITH STATUS MIGRAINOSUS, NOT INTRACTABLE: Primary | ICD-10-CM

## 2019-07-17 PROCEDURE — 64615 CHEMODENERV MUSC MIGRAINE: CPT | Performed by: PHYSICIAN ASSISTANT

## 2019-07-17 NOTE — PROGRESS NOTES
Chemodenervation  Date/Time: 7/17/2019 3:23 PM  Performed by: Roxy Lay PA-C  Authorized by: Roxy Lay PA-C     Pre-procedure details:     Prepped With: Alcohol    Anesthesia (see MAR for exact dosages): Anesthesia method:  None  Procedure details:     Position:  Upright  Botox:     Botox Type:  Type A    Brand:  Botox    mL's of Botulinum Toxin:  200    Final Concentration per CC:  200 units    Needle Gauge:  30 G 2 5 inch  Procedures:     Botox Procedures: chronic headache      Indications: migraines    Injection Location:     Head / Face:  L superior cervical paraspinal, R superior cervical paraspinal, L , R , L frontalis, R frontalis, L medial occipitalis, R medial occipitalis, procerus, R temporalis, L temporalis, R superior trapezius and L superior trapezius    L  injection amount:  5 unit(s)    R  injection amount:  5 unit(s)    L lateral frontalis:  5 unit(s)    R lateral frontalis:  5 unit(s)    L medial frontalis:  5 unit(s)    R medial frontalis:  5 unit(s)    L temporalis injection amount:  20 unit(s)    R temporalis injection amount:  20 unit(s)    Procerus injection amount:  5 unit(s)    L medial occipitalis injection amount:  15 unit(s)    R medial occipitalis injection amount:  15 unit(s)    L superior cervical paraspinal injection amount:  10 unit(s)    R superior cervical paraspinal injection amount:  10 unit(s)    L superior trapezius injection amount:  15 unit(s)    R superior trapezius injection amount:  15 unit(s)  Total Units:     Total units used:  200    Total units discarded:  0  Post-procedure details:     Chemodenervation:  Chronic migraine    Facial Nerve Location[de-identified]  Bilateral facial nerve    Patient tolerance of procedure:   Tolerated well, no immediate complications  Comments:      5 units orbicularis oculi bilaterally  35 units temporalis  All medically necessary

## 2019-07-22 ENCOUNTER — OFFICE VISIT (OUTPATIENT)
Dept: URGENT CARE | Facility: CLINIC | Age: 29
End: 2019-07-22
Payer: COMMERCIAL

## 2019-07-22 VITALS
WEIGHT: 131 LBS | TEMPERATURE: 98.6 F | DIASTOLIC BLOOD PRESSURE: 85 MMHG | OXYGEN SATURATION: 97 % | HEIGHT: 60 IN | BODY MASS INDEX: 25.72 KG/M2 | RESPIRATION RATE: 18 BRPM | SYSTOLIC BLOOD PRESSURE: 129 MMHG | HEART RATE: 98 BPM

## 2019-07-22 DIAGNOSIS — N39.0 URINARY TRACT INFECTION WITHOUT HEMATURIA, SITE UNSPECIFIED: Primary | ICD-10-CM

## 2019-07-22 LAB
SL AMB  POCT GLUCOSE, UA: NEGATIVE
SL AMB LEUKOCYTE ESTERASE,UA: ABNORMAL
SL AMB POCT BILIRUBIN,UA: NEGATIVE
SL AMB POCT BLOOD,UA: ABNORMAL
SL AMB POCT CLARITY,UA: CLEAR
SL AMB POCT COLOR,UA: YELLOW
SL AMB POCT KETONES,UA: NEGATIVE
SL AMB POCT NITRITE,UA: NEGATIVE
SL AMB POCT PH,UA: 5
SL AMB POCT SPECIFIC GRAVITY,UA: 1.03
SL AMB POCT URINE PROTEIN: NEGATIVE
SL AMB POCT UROBILINOGEN: ABNORMAL

## 2019-07-22 PROCEDURE — S9088 SERVICES PROVIDED IN URGENT: HCPCS | Performed by: PHYSICIAN ASSISTANT

## 2019-07-22 PROCEDURE — 99213 OFFICE O/P EST LOW 20 MIN: CPT | Performed by: PHYSICIAN ASSISTANT

## 2019-07-22 PROCEDURE — 81002 URINALYSIS NONAUTO W/O SCOPE: CPT | Performed by: PHYSICIAN ASSISTANT

## 2019-07-22 PROCEDURE — 87186 SC STD MICRODIL/AGAR DIL: CPT | Performed by: PHYSICIAN ASSISTANT

## 2019-07-22 PROCEDURE — 87077 CULTURE AEROBIC IDENTIFY: CPT | Performed by: PHYSICIAN ASSISTANT

## 2019-07-22 PROCEDURE — 87086 URINE CULTURE/COLONY COUNT: CPT | Performed by: PHYSICIAN ASSISTANT

## 2019-07-22 RX ORDER — SULFAMETHOXAZOLE AND TRIMETHOPRIM 800; 160 MG/1; MG/1
1 TABLET ORAL EVERY 12 HOURS SCHEDULED
Qty: 6 TABLET | Refills: 0 | Status: SHIPPED | OUTPATIENT
Start: 2019-07-22 | End: 2019-07-25

## 2019-07-22 NOTE — PATIENT INSTRUCTIONS
Take antibiotic as directed  Recommend yogurt/probiotic for GI side effects  Follow up with PCP in 3-5 days if no improvement  Go to ER if chest pain or difficulty breathing develop

## 2019-07-22 NOTE — PROGRESS NOTES
Assessment/Plan    Urinary tract infection without hematuria, site unspecified [N39 0]  1  Urinary tract infection without hematuria, site unspecified  POCT urine dip    sulfamethoxazole-trimethoprim (BACTRIM DS) 800-160 mg per tablet     Take antibiotic as directed  Recommend yogurt/probiotic for GI side effects  Follow up with PCP in 3-5 days if no improvement  Go to ER if chest pain or difficulty breathing develop    Subjective:     Patient ID: Joleen Peters is a 34 y o  female  Reason For Visit / Chief Complaint  Chief Complaint   Patient presents with    Urinary Urgency     last week started with pressure, today increased urgency, burning and incomplete voiding  Patient presents with complaint of urinary frequency x 1 week  Pt states that the urgency, dysuria, and pressure worsened today  Pt denies history of UTIs and denies known allergies to antibiotics  Pt denies fever, chills, night sweats, chest pain, SOB, abdominal pain, nausea, vomiting, and back/flank pain  Pt denies trying any palliative measures      Past Medical History:   Diagnosis Date    Anxiety     Depression     Migraines        Past Surgical History:   Procedure Laterality Date    INGUINAL HERNIA REPAIR  10/1998    REDUCTION MAMMAPLASTY  03/2013    TONSILLECTOMY  06/2011    TONSILLECTOMY      WISDOM TOOTH EXTRACTION         Family History   Problem Relation Age of Onset    Colon cancer Mother     Arthritis Mother     Thyroid disease Mother     Depression Mother     Anxiety disorder Mother     Clotting disorder Mother     Hyperlipidemia Mother     Vesicoureteral reflux Mother     Irritable bowel syndrome Mother    Taye Davis Migraines Mother     Skin cancer Mother     Thyroid disease unspecified Mother     Heart disease Father     Hypertension Father     Arthritis Father     Hyperlipidemia Father     Vesicoureteral reflux Father     Hypertension Brother     Hyperlipidemia Brother     COPD Maternal Grandmother  Stroke Maternal Grandmother     Lung cancer Maternal Grandmother     Arthritis Maternal Grandmother     Asthma Maternal Grandmother     Hyperlipidemia Maternal Grandmother     Vesicoureteral reflux Maternal Grandmother     Heart disease Maternal Grandmother     Hypertension Maternal Grandmother     Migraines Maternal Grandmother     Osteoporosis Maternal Grandmother     Heart attack Maternal Grandfather     Heart disease Maternal Grandfather     Diabetes Maternal Grandfather     Heart disease Paternal Grandmother     Hyperlipidemia Paternal Grandmother     Diabetes Paternal Grandfather     Kidney disease Maternal Aunt     Migraines Maternal Aunt     Heart attack Maternal Uncle     Heart disease Maternal Uncle     Melanoma Maternal Uncle     Lupus Cousin        Review of Systems   Constitutional: Negative for chills, fatigue and fever  Respiratory: Negative for cough, chest tightness, shortness of breath and wheezing  Cardiovascular: Negative for chest pain and palpitations  Genitourinary: Positive for dysuria, frequency and urgency  Musculoskeletal: Negative for myalgias  Skin: Negative for color change, rash and wound  Neurological: Negative for headaches  All other systems reviewed and are negative  Objective:    /85   Pulse 98   Temp 98 6 °F (37 °C) (Tympanic)   Resp 18   Ht 5' (1 524 m)   Wt 59 4 kg (131 lb)   SpO2 97%   BMI 25 58 kg/m²     Physical Exam   Constitutional: She is oriented to person, place, and time  She appears well-developed and well-nourished  No distress  HENT:   Head: Normocephalic and atraumatic  Eyes: Pupils are equal, round, and reactive to light  Conjunctivae and EOM are normal    Neck: Normal range of motion  Neck supple  Cardiovascular: Normal rate, regular rhythm and normal heart sounds  Pulmonary/Chest: Effort normal and breath sounds normal  No respiratory distress  She has no wheezes  Abdominal: Soft   Bowel sounds are normal  She exhibits no distension  There is no tenderness  Genitourinary:   Genitourinary Comments: No CVA tenderness   Lymphadenopathy:     She has no cervical adenopathy  Neurological: She is alert and oriented to person, place, and time  No cranial nerve deficit or sensory deficit  Skin: Skin is warm and dry  Capillary refill takes less than 2 seconds  No rash noted  She is not diaphoretic  Psychiatric: She has a normal mood and affect  Her behavior is normal  Thought content normal    Nursing note and vitals reviewed

## 2019-07-24 LAB — BACTERIA UR CULT: ABNORMAL

## 2019-09-12 DIAGNOSIS — E03.9 ACQUIRED HYPOTHYROIDISM: ICD-10-CM

## 2019-09-13 RX ORDER — LEVOTHYROXINE SODIUM 0.1 MG/1
100 TABLET ORAL DAILY
Qty: 90 TABLET | Refills: 0 | Status: SHIPPED | OUTPATIENT
Start: 2019-09-13 | End: 2020-03-03 | Stop reason: SDUPTHER

## 2019-09-17 ENCOUNTER — TELEPHONE (OUTPATIENT)
Dept: DERMATOLOGY | Facility: CLINIC | Age: 29
End: 2019-09-17

## 2019-10-08 ENCOUNTER — DOCUMENTATION (OUTPATIENT)
Dept: NEUROLOGY | Facility: CLINIC | Age: 29
End: 2019-10-08

## 2019-10-08 NOTE — PROGRESS NOTES
Type Date User Summary Attachment   General 10/08/2019 10:28 AM Katherine Randall care coordination  -   Note    Botox- authorization #: [id number]- valid from 10/7/2019 until 10/7/2020   Please use our stock      Thank you,     Kennedy Mijares

## 2019-10-23 ENCOUNTER — PROCEDURE VISIT (OUTPATIENT)
Dept: NEUROLOGY | Facility: CLINIC | Age: 29
End: 2019-10-23
Payer: COMMERCIAL

## 2019-10-23 VITALS — DIASTOLIC BLOOD PRESSURE: 74 MMHG | TEMPERATURE: 99.1 F | SYSTOLIC BLOOD PRESSURE: 106 MMHG | HEART RATE: 84 BPM

## 2019-10-23 DIAGNOSIS — G43.701 CHRONIC MIGRAINE WITHOUT AURA WITH STATUS MIGRAINOSUS, NOT INTRACTABLE: Primary | ICD-10-CM

## 2019-10-23 DIAGNOSIS — G44.86 CERVICOGENIC HEADACHE: ICD-10-CM

## 2019-10-23 DIAGNOSIS — G43.709 CHRONIC MIGRAINE WITHOUT AURA WITHOUT STATUS MIGRAINOSUS, NOT INTRACTABLE: ICD-10-CM

## 2019-10-23 PROCEDURE — 64615 CHEMODENERV MUSC MIGRAINE: CPT | Performed by: PHYSICIAN ASSISTANT

## 2019-10-23 RX ORDER — TOPIRAMATE 50 MG/1
50 TABLET, FILM COATED ORAL 2 TIMES DAILY
Qty: 180 TABLET | Refills: 1 | Status: SHIPPED | OUTPATIENT
Start: 2019-10-23 | End: 2020-01-31 | Stop reason: SDUPTHER

## 2019-10-23 RX ORDER — SUMATRIPTAN 100 MG/1
100 TABLET, FILM COATED ORAL ONCE AS NEEDED
Qty: 27 TABLET | Refills: 1 | Status: SHIPPED | OUTPATIENT
Start: 2019-10-23 | End: 2020-09-04

## 2019-10-23 RX ORDER — CYCLOBENZAPRINE HCL 5 MG
10 TABLET ORAL
Qty: 180 TABLET | Refills: 0 | Status: SHIPPED | OUTPATIENT
Start: 2019-10-23 | End: 2020-01-31 | Stop reason: SDUPTHER

## 2019-10-23 NOTE — PROGRESS NOTES
Chemodenervation  Date/Time: 10/23/2019 3:48 PM  Performed by: Christopher Melvin PA-C  Authorized by: Christopher Melvin PA-C     Pre-procedure details:     Prepped With: Alcohol    Anesthesia (see MAR for exact dosages): Anesthesia method:  None  Procedure details:     Position:  Upright  Botox:     Botox Type:  Type A    Brand:  Botox    mL's of Botulinum Toxin:  200    Final Concentration per CC:  200 units    Needle Gauge:  30 G 2 5 inch  Procedures:     Botox Procedures: chronic headache      Indications: migraines    Injection Location:     Head / Face:  L superior cervical paraspinal, R superior cervical paraspinal, L , R , L frontalis, R frontalis, L medial occipitalis, R medial occipitalis, procerus, R temporalis, L temporalis, R superior trapezius and L superior trapezius    L  injection amount:  5 unit(s)    R  injection amount:  5 unit(s)    L lateral frontalis:  5 unit(s)    R lateral frontalis:  5 unit(s)    L medial frontalis:  5 unit(s)    R medial frontalis:  5 unit(s)    L temporalis injection amount:  20 unit(s)    R temporalis injection amount:  20 unit(s)    Procerus injection amount:  5 unit(s)    L medial occipitalis injection amount:  15 unit(s)    R medial occipitalis injection amount:  15 unit(s)    L superior cervical paraspinal injection amount:  10 unit(s)    R superior cervical paraspinal injection amount:  10 unit(s)    L superior trapezius injection amount:  15 unit(s)    R superior trapezius injection amount:  15 unit(s)  Total Units:     Total units used:  200    Total units discarded:  0  Post-procedure details:     Chemodenervation:  Chronic migraine    Facial Nerve Location[de-identified]  Bilateral facial nerve    Patient tolerance of procedure:   Tolerated well, no immediate complications  Comments:      5 units orbicularis oculi bilaterally  35 units temporalis  All medically necessary

## 2019-11-19 ENCOUNTER — TELEPHONE (OUTPATIENT)
Dept: DERMATOLOGY | Facility: CLINIC | Age: 29
End: 2019-11-19

## 2019-11-23 ENCOUNTER — OFFICE VISIT (OUTPATIENT)
Dept: URGENT CARE | Facility: CLINIC | Age: 29
End: 2019-11-23
Payer: COMMERCIAL

## 2019-11-23 VITALS
HEART RATE: 108 BPM | BODY MASS INDEX: 24.74 KG/M2 | SYSTOLIC BLOOD PRESSURE: 112 MMHG | TEMPERATURE: 99.2 F | HEIGHT: 60 IN | RESPIRATION RATE: 20 BRPM | WEIGHT: 126 LBS | DIASTOLIC BLOOD PRESSURE: 75 MMHG | OXYGEN SATURATION: 97 %

## 2019-11-23 DIAGNOSIS — J01.90 ACUTE SINUSITIS, RECURRENCE NOT SPECIFIED, UNSPECIFIED LOCATION: Primary | ICD-10-CM

## 2019-11-23 PROCEDURE — 99213 OFFICE O/P EST LOW 20 MIN: CPT | Performed by: EMERGENCY MEDICINE

## 2019-11-23 PROCEDURE — S9088 SERVICES PROVIDED IN URGENT: HCPCS | Performed by: EMERGENCY MEDICINE

## 2019-11-23 RX ORDER — DEXTROAMPHETAMINE SACCHARATE, AMPHETAMINE ASPARTATE, DEXTROAMPHETAMINE SULFATE AND AMPHETAMINE SULFATE 5; 5; 5; 5 MG/1; MG/1; MG/1; MG/1
20 TABLET ORAL DAILY
Refills: 0 | COMMUNITY
Start: 2019-11-05

## 2019-11-23 RX ORDER — AMOXICILLIN 875 MG/1
875 TABLET, COATED ORAL 2 TIMES DAILY
Qty: 20 TABLET | Refills: 0 | Status: SHIPPED | OUTPATIENT
Start: 2019-11-23 | End: 2019-12-03

## 2019-11-23 NOTE — PROGRESS NOTES
Assessment/Plan:    No problem-specific Assessment & Plan notes found for this encounter  Diagnoses and all orders for this visit:    Acute sinusitis, recurrence not specified, unspecified location  -     amoxicillin (AMOXIL) 875 mg tablet; Take 1 tablet (875 mg total) by mouth 2 (two) times a day for 10 days    Other orders  -     amphetamine-dextroamphetamine (ADDERALL) 20 mg tablet          Subjective:      Patient ID: Shyanne Peter is a 34 y o  female  Pt c/o sinusitis, taking OTC meds for it with partial relief; no fever    Sinusitis   This is a new problem  The current episode started in the past 7 days  The problem is unchanged  There has been no fever  Her pain is at a severity of 3/10  The pain is mild  Associated symptoms include congestion, coughing and sinus pressure  Past treatments include nothing  The treatment provided no relief  The following portions of the patient's history were reviewed and updated as appropriate: allergies, current medications, past family history, past medical history, past social history, past surgical history and problem list     Review of Systems   HENT: Positive for congestion and sinus pressure  Respiratory: Positive for cough  All other systems reviewed and are negative  Objective:      /75   Pulse (!) 108   Temp 99 2 °F (37 3 °C)   Resp 20   Ht 5' (1 524 m)   Wt 57 2 kg (126 lb)   SpO2 97%   BMI 24 61 kg/m²          Physical Exam   Constitutional: She is oriented to person, place, and time  She appears well-developed and well-nourished  HENT:   Right Ear: External ear normal    Left Ear: External ear normal    Nose: Nose normal    Post nasal drip   Eyes: Pupils are equal, round, and reactive to light  Neck: Normal range of motion  Cardiovascular: Normal rate  Pulmonary/Chest: Effort normal and breath sounds normal    Abdominal: Soft  Neurological: She is alert and oriented to person, place, and time     Skin: Skin is warm and dry  Psychiatric: She has a normal mood and affect  Her behavior is normal  Judgment and thought content normal    Nursing note and vitals reviewed

## 2019-12-13 ENCOUNTER — TELEPHONE (OUTPATIENT)
Dept: DERMATOLOGY | Facility: CLINIC | Age: 29
End: 2019-12-13

## 2020-01-13 ENCOUNTER — TELEPHONE (OUTPATIENT)
Dept: NEUROLOGY | Facility: CLINIC | Age: 30
End: 2020-01-13

## 2020-01-13 NOTE — TELEPHONE ENCOUNTER
Called patient- lmom awaiting a call back  When patient calls back please advise her that I need a copy of her insurance cards (front and back)  If patient can provide us with her ID number verbally over the phone that way I can still submit her authorization in a timely manner  Lia Camp,    Please follow-up with the patient if she does not call back  Thanks    PA forms filled out and awaiting Dr Matias Situ- once signed will fax for authorization

## 2020-01-21 NOTE — TELEPHONE ENCOUNTER
Called [facility] Admin- spoke with [facility]- she states the patient's Botox authorization has been received and is pending       Due date: 1/31/2020  Pending auth #: [id number]

## 2020-01-24 ENCOUNTER — DOCUMENTATION (OUTPATIENT)
Dept: NEUROLOGY | Facility: CLINIC | Age: 30
End: 2020-01-24

## 2020-01-24 NOTE — PROGRESS NOTES
Type Date User Summary Attachment   General 01/24/2020 12:57 PM Sandra Beckham care coordination  -   Note    Botox- authorization #: [id number]- valid for 4 visits- from 1/31/2020 until 1/30/2021   Please use our stock      Thank you,     Stephany Pulido

## 2020-01-24 NOTE — TELEPHONE ENCOUNTER
Received a call from Shelley at [employer]  She states she is calling because she needs some additional clincal information  I was able to provide her with the start date the pateint received Botox as well as how many headaches she was having prior to Botox  After providing that information the patient's Botox has been approved  She provided me with the following authorization information:    Authorization information:    Authorization #: [id number]  Valid dates: 1/31/2020 until 1/30/2021- valid for 4 vists  Will await approval letter via fax  done

## 2020-01-31 ENCOUNTER — PROCEDURE VISIT (OUTPATIENT)
Dept: NEUROLOGY | Facility: CLINIC | Age: 30
End: 2020-01-31
Payer: COMMERCIAL

## 2020-01-31 VITALS — HEART RATE: 107 BPM | TEMPERATURE: 99.1 F | SYSTOLIC BLOOD PRESSURE: 119 MMHG | DIASTOLIC BLOOD PRESSURE: 72 MMHG

## 2020-01-31 DIAGNOSIS — G43.709 CHRONIC MIGRAINE WITHOUT AURA WITHOUT STATUS MIGRAINOSUS, NOT INTRACTABLE: ICD-10-CM

## 2020-01-31 DIAGNOSIS — G44.86 CERVICOGENIC HEADACHE: ICD-10-CM

## 2020-01-31 DIAGNOSIS — G43.701 CHRONIC MIGRAINE WITHOUT AURA WITH STATUS MIGRAINOSUS, NOT INTRACTABLE: Primary | ICD-10-CM

## 2020-01-31 PROCEDURE — 64615 CHEMODENERV MUSC MIGRAINE: CPT | Performed by: PHYSICIAN ASSISTANT

## 2020-01-31 RX ORDER — TOPIRAMATE 50 MG/1
50 TABLET, FILM COATED ORAL 2 TIMES DAILY
Qty: 180 TABLET | Refills: 3 | Status: SHIPPED | OUTPATIENT
Start: 2020-01-31 | End: 2020-11-06 | Stop reason: SDUPTHER

## 2020-01-31 RX ORDER — CYCLOBENZAPRINE HCL 5 MG
10 TABLET ORAL
Qty: 180 TABLET | Refills: 3 | Status: SHIPPED | OUTPATIENT
Start: 2020-01-31 | End: 2022-06-03

## 2020-02-04 ENCOUNTER — OFFICE VISIT (OUTPATIENT)
Dept: FAMILY MEDICINE CLINIC | Facility: CLINIC | Age: 30
End: 2020-02-04
Payer: COMMERCIAL

## 2020-02-04 VITALS
HEIGHT: 60 IN | DIASTOLIC BLOOD PRESSURE: 64 MMHG | HEART RATE: 80 BPM | WEIGHT: 122 LBS | SYSTOLIC BLOOD PRESSURE: 102 MMHG | BODY MASS INDEX: 23.95 KG/M2

## 2020-02-04 DIAGNOSIS — R20.2 PARESTHESIAS: ICD-10-CM

## 2020-02-04 DIAGNOSIS — M41.25 OTHER IDIOPATHIC SCOLIOSIS, THORACOLUMBAR REGION: Primary | ICD-10-CM

## 2020-02-04 DIAGNOSIS — M54.40 BACK PAIN OF LUMBAR REGION WITH SCIATICA: ICD-10-CM

## 2020-02-04 PROCEDURE — 1036F TOBACCO NON-USER: CPT | Performed by: PHYSICIAN ASSISTANT

## 2020-02-04 PROCEDURE — 3008F BODY MASS INDEX DOCD: CPT | Performed by: PHYSICIAN ASSISTANT

## 2020-02-04 PROCEDURE — 99213 OFFICE O/P EST LOW 20 MIN: CPT | Performed by: PHYSICIAN ASSISTANT

## 2020-02-04 RX ORDER — FLUOXETINE 10 MG/1
20 CAPSULE ORAL
COMMUNITY
Start: 2019-12-23

## 2020-02-05 NOTE — PROGRESS NOTES
Assessment and Plan:    Problem List Items Addressed This Visit        Nervous and Auditory    Back pain of lumbar region with sciatica     Patient does have a history of lumbar scoliosis last checked in 2012 with moderate scoliosis diagnosis at a 20 degree curvature  Recheck spinal scoliosis series  Most likely patient will need an MRI lumbar spine to rule out disc involvement causing her current symptoms  Patient is status post PT eval and course of treatment roughly 2012 to 2015  Musculoskeletal and Integument    Other idiopathic scoliosis, thoracolumbar region - Primary     Pt having an exacerbation of neck upper back and lumbar pains with sciatica  Need to check a scoliosis xray  Then will be able to determine if we can get mri covered  Relevant Orders    XR entire spine (scoliosis) 4-5 vw       Other    Paresthesias     Patient does have bilateral hand paresthesias and is status post EMG which showed possible ulnar neuropathy in again 2012 to 2015  Symptoms continue  Patient has a history of thoracic go lumbar scoliosis and we may need MRI C-spine to rule out disc degeneration and cause for symptoms  Check spine scoliosis series 1st   Patient is status post PT eval and treat with no improvement in her symptoms  Diagnoses and all orders for this visit:    Other idiopathic scoliosis, thoracolumbar region  -     XR entire spine (scoliosis) 4-5 vw; Future    Back pain of lumbar region with sciatica    Paresthesias    Other orders  -     FLUoxetine (PROzac) 10 mg capsule              Subjective:      Patient ID: Pooja Maloney is a 34 y o  female  CC:    Chief Complaint   Patient presents with    Back Pain     patient c/o upper back and lower back pain/sciatica in the last few months  Patient sees Neuro  and they give her Flexeril  Patient having difficulty sleeping, painful standing, walking   Ortho said she had Piriformis syndrome and went through P T  patient is requesting MRI  Patients last xray was years ago  ak       HPI:    Patient here today because she has been having exacerbations of both her bilateral upper trapezius and neck pain which is causing her have numbness and tingling and discomfort in bilateral mostly 4th and 5th digits  She does see Neurology for migraines and trapezius issues and has been getting Botox injections which have been helping however now because of this exacerbation they are sending her for point trigger injections with and different specialists within Neurology  Also she has continued to have exacerbation of bilateral low back and central low back pains with pain that goes down both legs intermittently  She has no weakness of either upper or lower extremities  She does have a history in 2012 of the lumbar spine x-ray showing moderate scoliosis with a 20 degree curvature she also has a history in her chart of a 2015  Cervical spine x-ray which was normal but showing scoliosis on thoracic spine as well  She did undergo EMG bilateral upper extremities in a different healthcare setting in 2012 to 2015 which showed ulnar neuropathy  This was also in coordination of breast reduction which she did end up having in 2013 but unfortunately did not alleviate and of her symptoms  She did go through physical therapy for both upper and lower back discomfort at the same time period 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  The following portions of the patient's history were reviewed and updated as appropriate: allergies, current medications, past family history, past medical history, past social history, past surgical history and problem list       Review of Systems   Constitutional: Negative  HENT: Negative  Eyes: Negative  Respiratory: Negative  Cardiovascular: Negative  Gastrointestinal: Negative  Endocrine: Negative  Genitourinary: Negative  Musculoskeletal: Positive for back pain, neck pain and neck stiffness  Skin: Negative  Allergic/Immunologic: Negative  Neurological: Positive for numbness and headaches  Hematological: Negative  Psychiatric/Behavioral: Negative  Data to review:       Objective:    Vitals:    02/04/20 1910   BP: 102/64   Pulse: 80   Weight: 55 3 kg (122 lb)   Height: 5' (1 524 m)        Physical Exam   Constitutional: She is oriented to person, place, and time  She appears well-developed and well-nourished  No distress  HENT:   Head: Normocephalic and atraumatic  Eyes: Conjunctivae are normal  Right eye exhibits no discharge  Left eye exhibits no discharge  Neck: Neck supple  Carotid bruit is not present  Cardiovascular: Normal rate, regular rhythm and normal heart sounds  Exam reveals no gallop and no friction rub  No murmur heard  Pulmonary/Chest: Effort normal and breath sounds normal  No respiratory distress  She has no wheezes  She has no rales  Musculoskeletal:   Bilateral upper and lower extremities with good strength and pulses  Full range of motion  Patient does have pain and tenderness to bilateral sciatic notch SI joints and low back  She does have an obvious curvature with in swelling of the right thoracic musculature even though she is left handed  Deep tendon reflexes within normal limits bilateral upper lower extremities  Neurological: She is alert and oriented to person, place, and time  Skin: Skin is warm and dry  She is not diaphoretic  Psychiatric: She has a normal mood and affect  Judgment normal    Nursing note and vitals reviewed

## 2020-02-05 NOTE — ASSESSMENT & PLAN NOTE
Patient does have bilateral hand paresthesias and is status post EMG which showed possible ulnar neuropathy in again 2012 to 2015  Symptoms continue  Patient has a history of thoracic go lumbar scoliosis and we may need MRI C-spine to rule out disc degeneration and cause for symptoms  Check spine scoliosis series 1st   Patient is status post PT eval and treat with no improvement in her symptoms

## 2020-02-05 NOTE — ASSESSMENT & PLAN NOTE
Patient does have a history of lumbar scoliosis last checked in 2012 with moderate scoliosis diagnosis at a 20 degree curvature  Recheck spinal scoliosis series  Most likely patient will need an MRI lumbar spine to rule out disc involvement causing her current symptoms  Patient is status post PT eval and course of treatment roughly 2012 to 2015

## 2020-02-05 NOTE — ASSESSMENT & PLAN NOTE
Pt having an exacerbation of neck upper back and lumbar pains with sciatica  Need to check a scoliosis xray  Then will be able to determine if we can get mri covered

## 2020-02-05 NOTE — PATIENT INSTRUCTIONS
Problem List Items Addressed This Visit        Nervous and Auditory    Back pain of lumbar region with sciatica     Patient does have a history of lumbar scoliosis last checked in 2012 with moderate scoliosis diagnosis at a 20 degree curvature  Recheck spinal scoliosis series  Most likely patient will need an MRI lumbar spine to rule out disc involvement causing her current symptoms  Patient is status post PT eval and course of treatment roughly 2012 to 2015  Musculoskeletal and Integument    Other idiopathic scoliosis, thoracolumbar region - Primary     Pt having an exacerbation of neck upper back and lumbar pains with sciatica  Need to check a scoliosis xray  Then will be able to determine if we can get mri covered  Relevant Orders    XR entire spine (scoliosis) 4-5 vw       Other    Paresthesias     Patient does have bilateral hand paresthesias and is status post EMG which showed possible ulnar neuropathy in again 2012 to 2015  Symptoms continue  Patient has a history of thoracic go lumbar scoliosis and we may need MRI C-spine to rule out disc degeneration and cause for symptoms  Check spine scoliosis series 1st   Patient is status post PT eval and treat with no improvement in her symptoms

## 2020-02-10 ENCOUNTER — TELEPHONE (OUTPATIENT)
Dept: NEUROLOGY | Facility: CLINIC | Age: 30
End: 2020-02-10

## 2020-02-10 NOTE — TELEPHONE ENCOUNTER
Attempted to reach patient to see if she would be interested in coming in sooner for her appointment with Dr Adalgisa Mcnamara in [facility]  Provider's schedule has opened up on Mondays and he has some availability as soon as next week  LMOM for patient to call back if she is interested in rescheduling

## 2020-02-14 ENCOUNTER — OFFICE VISIT (OUTPATIENT)
Dept: URGENT CARE | Facility: CLINIC | Age: 30
End: 2020-02-14
Payer: COMMERCIAL

## 2020-02-14 VITALS
DIASTOLIC BLOOD PRESSURE: 68 MMHG | SYSTOLIC BLOOD PRESSURE: 104 MMHG | HEIGHT: 61 IN | WEIGHT: 125 LBS | TEMPERATURE: 99.3 F | RESPIRATION RATE: 18 BRPM | BODY MASS INDEX: 23.6 KG/M2 | HEART RATE: 104 BPM | OXYGEN SATURATION: 96 %

## 2020-02-14 DIAGNOSIS — J01.00 ACUTE NON-RECURRENT MAXILLARY SINUSITIS: Primary | ICD-10-CM

## 2020-02-14 PROCEDURE — G0382 LEV 3 HOSP TYPE B ED VISIT: HCPCS | Performed by: PREVENTIVE MEDICINE

## 2020-02-14 RX ORDER — AMOXICILLIN 500 MG/1
500 CAPSULE ORAL EVERY 8 HOURS SCHEDULED
Qty: 21 CAPSULE | Refills: 0 | Status: SHIPPED | OUTPATIENT
Start: 2020-02-14 | End: 2020-02-21

## 2020-02-14 NOTE — PROGRESS NOTES
[facility]        NAME: Venessa Flores is a 34 y o  female  : 1990    MRN: [id number]  DATE: 2020  TIME: 2:16 PM    Assessment and Plan   Acute non-recurrent maxillary sinusitis [J01 00]  1  Acute non-recurrent maxillary sinusitis  amoxicillin (AMOXIL) 500 mg capsule         Patient Instructions       Follow up with PCP in 3-5 days  Proceed to  ER if symptoms worsen  Chief Complaint     Chief Complaint   Patient presents with    Facial Pain     Sinus c/o (waxing and waning) x ~ 3 weeks; Taking Allegra (Daily) and Sudafed  History of Present Illness       Three weeks of head cold symptoms and head congestion  Last several days increasing facial pain facial pressure in the teeth her aching  Review of Systems   Review of Systems   Constitutional: Negative for fever  HENT: Positive for postnasal drip, sinus pressure and sinus pain  Respiratory: Negative for cough            Current Medications       Current Outpatient Medications:     amoxicillin (AMOXIL) 500 mg capsule, Take 1 capsule (500 mg total) by mouth every 8 (eight) hours for 7 days, Disp: 21 capsule, Rfl: 0    amphetamine-dextroamphetamine (ADDERALL XR) 30 MG 24 hr capsule, , Disp: , Rfl:     amphetamine-dextroamphetamine (ADDERALL) 20 mg tablet, , Disp: , Rfl: 0    buPROPion (WELLBUTRIN SR) 200 MG 12 hr tablet, Take 200 mg by mouth daily , Disp: , Rfl:     cyclobenzaprine (FLEXERIL) 5 mg tablet, Take 2 tablets (10 mg total) by mouth daily at bedtime, Disp: 180 tablet, Rfl: 3    diazepam (VALIUM) 5 mg tablet, Take 5 mg by mouth every 6 (six) hours as needed , Disp: , Rfl:     fexofenadine (ALLEGRA) 180 MG tablet, Take 180 mg by mouth daily, Disp: , Rfl:     FLUoxetine (PROzac) 10 mg capsule, , Disp: , Rfl:     Ibuprofen 200 MG CAPS, Take 800 mg by mouth every 6 (six) hours as needed, Disp: , Rfl:     levothyroxine 100 mcg tablet, Take 1 tablet (100 mcg total) by mouth daily, Disp: 90 tablet, Rfl: 0    Linaclotide 72 MCG CAPS, Take 72 mcg by mouth daily, Disp: 30 capsule, Rfl: 11    OLANZapine-FLUoxetine (SYMBYAX) 3-25 MG per capsule, Take 1 capsule by mouth every evening, Disp: , Rfl:     polyethylene glycol (MIRALAX) 17 g packet, Take 17 g by mouth daily, Disp: , Rfl:     prochlorperazine (COMPAZINE) 10 mg tablet, Take 1 tablet (10 mg total) by mouth every 6 (six) hours as needed (migraine) (Patient not taking: Reported on 11/23/2019), Disp: 10 tablet, Rfl: 0    SUMAtriptan (IMITREX) 100 mg tablet, Take 1 tablet (100 mg total) by mouth once as needed for migraine for up to 1 dose Max 2/24 hours, 9/month, Disp: 27 tablet, Rfl: 1    topiramate (TOPAMAX) 50 MG tablet, Take 1 tablet (50 mg total) by mouth 2 (two) times a day, Disp: 180 tablet, Rfl: 3    Current Allergies     Allergies as of 02/14/2020 - Reviewed 02/14/2020   Allergen Reaction Noted    Oxcarbazepine Hives 12/23/2014            The following portions of the patient's history were reviewed and updated as appropriate: allergies, current medications, past family history, past medical history, past social history, past surgical history and problem list      Past Medical History:   Diagnosis Date    Anxiety     Depression     Migraines        Past Surgical History:   Procedure Laterality Date    INGUINAL HERNIA REPAIR  10/1998    REDUCTION MAMMAPLASTY  03/2013    TONSILLECTOMY  06/2011    TONSILLECTOMY      WISDOM TOOTH EXTRACTION         Family History   Problem Relation Age of Onset    Colon cancer Mother     Arthritis Mother     Thyroid disease Mother     Depression Mother     Anxiety disorder Mother     Clotting disorder Mother     Hyperlipidemia Mother     Vesicoureteral reflux Mother     Irritable bowel syndrome Mother    Issac Flower Migraines Mother     Skin cancer Mother     Thyroid disease unspecified Mother     Heart disease Father     Hypertension Father     Arthritis Father     Hyperlipidemia Father     Vesicoureteral reflux Father     Hypertension Brother     Hyperlipidemia Brother     COPD Maternal Grandmother     Stroke Maternal Grandmother     Lung cancer Maternal Grandmother     Arthritis Maternal Grandmother     Asthma Maternal Grandmother     Hyperlipidemia Maternal Grandmother     Vesicoureteral reflux Maternal Grandmother     Heart disease Maternal Grandmother     Hypertension Maternal Grandmother     Migraines Maternal Grandmother     Osteoporosis Maternal Grandmother     Heart attack Maternal Grandfather     Heart disease Maternal Grandfather     Diabetes Maternal Grandfather     Heart disease Paternal Grandmother     Hyperlipidemia Paternal Grandmother     Diabetes Paternal Grandfather     Kidney disease Maternal Aunt     Migraines Maternal Aunt     Heart attack Maternal Uncle     Heart disease Maternal Uncle     Melanoma Maternal Uncle     Lupus Cousin          Medications have been verified          Objective   /68   Pulse 104   Temp 99 3 °F (37 4 °C)   Resp 18   Ht 5' 1" (1 549 m)   Wt 56 7 kg (125 lb)   LMP 01/28/2020 (Approximate)   SpO2 96%   BMI 23 62 kg/m²        Physical Exam     Physical Exam   HENT:   Right Ear: External ear normal    Left Ear: External ear normal    Mouth/Throat: Oropharynx is clear and moist    Pain and tenderness when tapping the frontal sinuses but more so when tapping the maxillary sinuses

## 2020-02-25 ENCOUNTER — TELEPHONE (OUTPATIENT)
Dept: FAMILY MEDICINE CLINIC | Facility: CLINIC | Age: 30
End: 2020-02-25

## 2020-02-25 NOTE — TELEPHONE ENCOUNTER
Lab orders are in the computer  We can give her a refill but she needs to get her labs done before any more  I just saw her so I can call her with the results  Thank you!

## 2020-02-25 NOTE — TELEPHONE ENCOUNTER
KB, Pt is requesting a refill for her Levothyroxine 100 mcg but has not has labs since May 2019, May we refill or does pt need an appt?

## 2020-03-03 DIAGNOSIS — E03.9 ACQUIRED HYPOTHYROIDISM: ICD-10-CM

## 2020-03-03 RX ORDER — LEVOTHYROXINE SODIUM 0.1 MG/1
100 TABLET ORAL DAILY
Qty: 30 TABLET | Refills: 0 | Status: SHIPPED | OUTPATIENT
Start: 2020-03-03 | End: 2020-03-30 | Stop reason: SDUPTHER

## 2020-03-11 ENCOUNTER — TELEPHONE (OUTPATIENT)
Dept: NEUROLOGY | Facility: CLINIC | Age: 30
End: 2020-03-11

## 2020-03-11 NOTE — TELEPHONE ENCOUNTER
Called patient to see if she would be interested in coming in for a sooner appointment with Dr Brian Peguero  Provider had some cancellations  Patient was not able to take any of the days/times offered and will keep her current appointment as scheduled

## 2020-03-27 ENCOUNTER — TELEMEDICINE (OUTPATIENT)
Dept: FAMILY MEDICINE CLINIC | Facility: CLINIC | Age: 30
End: 2020-03-27
Payer: COMMERCIAL

## 2020-03-27 ENCOUNTER — OFFICE VISIT (OUTPATIENT)
Dept: URGENT CARE | Facility: MEDICAL CENTER | Age: 30
End: 2020-03-27

## 2020-03-27 VITALS — TEMPERATURE: 100.2 F

## 2020-03-27 DIAGNOSIS — B34.9 VIRAL ILLNESS: ICD-10-CM

## 2020-03-27 DIAGNOSIS — Z20.828 EXPOSURE TO SARS-ASSOCIATED CORONAVIRUS: ICD-10-CM

## 2020-03-27 DIAGNOSIS — Z20.828 EXPOSURE TO SARS-ASSOCIATED CORONAVIRUS: Primary | ICD-10-CM

## 2020-03-27 PROCEDURE — 99214 OFFICE O/P EST MOD 30 MIN: CPT | Performed by: PHYSICIAN ASSISTANT

## 2020-03-27 NOTE — ASSESSMENT & PLAN NOTE
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

## 2020-03-27 NOTE — PROGRESS NOTES
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 nausea loose stools headache body aches weakness sore throat  She was signed out to [place] [facility] now and they will be calling her with further instructions  Order is placed for COVID care now collection of swab and they will be performing the swab and sending it to [facility] and not through [facility] lab providing us with hopeful 24-40 hour turnaround  Until results are known patient is to self-quarantine according to the CDC guidelines  Patient agrees  Other Visit Diagnoses     Exposure to SARS-associated coronavirus    -  Primary    Relevant Orders    MISC COVID-19 TEST- Collected at   [facility] or [facility]               Reason for visit is sick    Encounter provider Gerhard Verdin PA-C    Provider located at [address] [place] [address] PRIMARY CARE  [address]      Recent Visits  No visits were found meeting these conditions  Showing recent visits within past 7 days and meeting all other requirements     Today's Visits  Date Type Provider Dept   03/27/20 [facility], [facility] Primary Care   Showing today's visits and meeting all other requirements     Future Appointments  Date Type Provider Dept   03/27/20 Telemedicine Melyssa Kebede PA-C [facility] Primary Care   Showing future appointments within next 150 days and meeting all other requirements        After connecting through [employer], the patient was identified by name and date of birth  Nelli Saenz was informed that this is a telemedicine visit and that the visit is being conducted through [employer] and patient was informed that this is not a secure, HIPAA-complaint platform  she agrees to proceed   which may not be secure and therefore, might not be HIPAA-compliant  My office door was closed  No one else was in the room  She acknowledged consent and understanding of privacy and security of the video platform  The patient has agreed to participate and understands they can discontinue the visit at any time  Braden Palacios is a 27 y o  female pt  Past Medical History:   Diagnosis Date    Anxiety     Depression     Migraines        Past Surgical History:   Procedure Laterality Date    INGUINAL HERNIA REPAIR  10/1998    REDUCTION MAMMAPLASTY  03/2013    TONSILLECTOMY  06/2011    TONSILLECTOMY      WISDOM TOOTH EXTRACTION         Current Outpatient Medications   Medication Sig Dispense Refill    amphetamine-dextroamphetamine (ADDERALL) 20 mg tablet   0    buPROPion (WELLBUTRIN SR) 150 mg 12 hr tablet Take 150 mg by mouth 2 (two) times a day       cyclobenzaprine (FLEXERIL) 5 mg tablet Take 2 tablets (10 mg total) by mouth daily at bedtime 180 tablet 3    diazepam (VALIUM) 5 mg tablet Take 5 mg by mouth every 6 (six) hours as needed       fexofenadine (ALLEGRA) 180 MG tablet Take 180 mg by mouth daily      FLUoxetine (PROzac) 10 mg capsule       Ibuprofen 200 MG CAPS Take 800 mg by mouth every 6 (six) hours as needed      levothyroxine 100 mcg tablet Take 1 tablet (100 mcg total) by mouth daily 30 tablet 0    OLANZapine-FLUoxetine (SYMBYAX) 3-25 MG per capsule Take 1 capsule by mouth every evening      polyethylene glycol (MIRALAX) 17 g packet Take 17 g by mouth daily      SUMAtriptan (IMITREX) 100 mg tablet Take 1 tablet (100 mg total) by mouth once as needed for migraine for up to 1 dose Max 2/24 hours, 9/month 27 tablet 1    topiramate (TOPAMAX) 50 MG tablet Take 1 tablet (50 mg total) by mouth 2 (two) times a day 180 tablet 3     No current facility-administered medications for this visit           Allergies   Allergen Reactions    Oxcarbazepine Hives     Hives       Review of Systems   Constitutional: Positive for chills, diaphoresis, fatigue and fever  HENT: Negative  Eyes: Negative  Respiratory: Negative  Negative for cough and shortness of breath  Cardiovascular: Negative  Gastrointestinal: Positive for diarrhea and nausea  Endocrine: Negative  Genitourinary: Negative  Musculoskeletal: Positive for myalgias  Skin: Negative  Allergic/Immunologic: Negative  Neurological: Positive for weakness, light-headedness and headaches  Hematological: Negative  Psychiatric/Behavioral: Negative  Physical Exam   Constitutional: She is oriented to person, place, and time  She appears well-developed and well-nourished  No distress  HENT:   Head: Normocephalic and atraumatic  Right Ear: Hearing and external ear normal    Left Ear: Hearing and external ear normal    Eyes: Pupils are equal, round, and reactive to light  Conjunctivae are normal    Pulmonary/Chest: Effort normal  No respiratory distress  Neurological: She is alert and oriented to person, place, and time  Skin: No rash noted  She is not diaphoretic  Psychiatric: She has a normal mood and affect  Her speech is normal and behavior is normal  Judgment and thought content normal         I spent 30 minutes with the patient during this visit  HPI  Patient did a video visit today over duo [employer]  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 and all week this week seeing patients and had the pleasure of being on call over the weekend last weekend having to go into [facility] inpatient behavioral unit  She states that yesterday she started feel weak lightheaded nauseous body aches headaches her temperature all week has been ranging 99 8 or below but this morning she woke up and her temperature was 100 2° and she called off of work because she felt even worse with her body aches headaches loose stool nausea lightheaded weakness    She denies any cough or upper head congestion symptoms but does have a somewhat sore throat for the past 2 days including today  I did get the advice of our point person on COVID in the office and my  and 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 and with this case because she does need a swab she needs to go to care now and we need to coordinate this swabbing that will then go to the [facility] which has a 24-48 hour turnaround time  I called [facility] spoke with 1 of the 2 providers there today they collected information regarding patient's name and date of birth symptoms when symptoms started what specific symptoms she had and I will be placing the order for the test she will be called by [facility] pro[facility] for directions and they will swab her and for send her specimen to the [facility]  Patient is aware and agrees and will cancel patient's for Monday as even if she is negative this will still be within 72 hours of having a fever and she needs to have 72 hours of no fever off fever medication in order to return to work whether she is positive her negative for COVID at this time  Data to review:          COVID 19 Instructions    Maria Isabel Goodman was advised to limit contact with others to essential tasks such as getting food, medications, and medical care  Proper handwashing reviewed, and Hand sanitzer when washing is not available  If the patient develops symptoms of COVID 19, the patient should call the office as soon as possible  Patient was offered video visit today  Patient refused: YES/NO: no    Examination today was by video/telephone visit  Observational measures that are included were noted on video or by patient/guardian description  Vital signs are include in this visit were obtained by the patient, using their own equipment at home       Virtual Visit expected code: [id number]

## 2020-03-27 NOTE — PATIENT INSTRUCTIONS
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 nausea loose stools headache body aches weakness sore throat  She was signed out to [place] [facility] now and they will be calling her with further instructions  Order is placed for COVID care now collection of swab and they will be performing the swab and sending it to [facility] and not through [address] [place] [address] lab providing us with hopeful 24-40 hour turnaround  Until results are known patient is to self-quarantine according to the CDC guidelines  Patient agrees             Other Visit Diagnoses     Exposure to SARS-associated coronavirus    -  Primary    Relevant Orders    MISC COVID-19 TEST- Collected at [facility] or [facility]

## 2020-03-29 LAB — CORONAVIRUS 2019-NCOV: NOT DETECTED

## 2020-03-30 ENCOUNTER — TELEMEDICINE (OUTPATIENT)
Dept: FAMILY MEDICINE CLINIC | Facility: CLINIC | Age: 30
End: 2020-03-30
Payer: COMMERCIAL

## 2020-03-30 ENCOUNTER — TELEPHONE (OUTPATIENT)
Dept: URGENT CARE | Age: 30
End: 2020-03-30

## 2020-03-30 DIAGNOSIS — E03.9 ACQUIRED HYPOTHYROIDISM: ICD-10-CM

## 2020-03-30 DIAGNOSIS — B34.9 VIRAL ILLNESS: Primary | ICD-10-CM

## 2020-03-30 PROCEDURE — 99213 OFFICE O/P EST LOW 20 MIN: CPT | Performed by: PHYSICIAN ASSISTANT

## 2020-03-30 RX ORDER — LEVOTHYROXINE SODIUM 0.1 MG/1
100 TABLET ORAL DAILY
Qty: 30 TABLET | Refills: 11 | Status: SHIPPED | OUTPATIENT
Start: 2020-03-30 | End: 2021-02-18

## 2020-03-30 NOTE — ASSESSMENT & PLAN NOTE
Pt was informed today via video visit that her covid testing was negative  She is still having fevers mostly in the evening and has developed more head congestion and diarrhea with abdominal cramping  She is to treat her symptoms as still viral and she will be able to RTW when she goes 3 days without a fever off fever reducing medications  She will notify me when she will be returning to work and then I will make her a note for clearance  PT agreeable  BRAT diet increase in fluids and rest Avoid dairy and caffeen

## 2020-03-30 NOTE — TELEPHONE ENCOUNTER
Left message for patient to call the [facility]  We pt calls, please inform her COVID-19 Negative

## 2020-03-30 NOTE — PROGRESS NOTES
Virtual Regular Visit    Problem List Items Addressed This Visit        Other    Viral illness - Primary     Pt was informed today via video visit that her covid testing was negative  She is still having fevers mostly in the evening and has developed more head congestion and diarrhea with abdominal cramping  She is to treat her symptoms as still viral and she will be able to RTW when she goes 3 days without a fever off fever reducing medications  She will notify me when she will be returning to work and then I will make her a note for clearance  PT agreeable  BRAT diet increase in fluids and rest Avoid dairy and caffeen  Reason for visit is follow up to testing and symptoms    Encounter provider Sharlette Habermann, PA-C    Provider located at [address] [place] [address]      Recent Visits  Date Type Provider Dept   03/27/20 [address], [facility] Primary Care   Showing recent visits within past 7 days and meeting all other requirements     Today's Visits  Date Type Provider Dept   03/30/20 113Jayshree Macias PA-C [facility] Primary Care   Showing today's visits and meeting all other requirements     Future Appointments  No visits were found meeting these conditions  Showing future appointments within next 150 days and meeting all other requirements        The patient was identified by name and date of birth  Rosario Astorga was informed that this is a telemedicine visit and that the visit is being conducted through [employer] and patient was informed that this is not a secure, HIPAA-complaint platform  she agrees to proceed     My office door was closed  No one else was in the room  She acknowledged consent and understanding of privacy and security of the video platform  The patient has agreed to participate and understands they can discontinue the visit at any time      Patient is aware this is a billable service  Geneva Pimentel is a 27 y o  female pt  Past Medical History:   Diagnosis Date    Anxiety     Depression     Migraines        Past Surgical History:   Procedure Laterality Date    INGUINAL HERNIA REPAIR  10/1998    REDUCTION MAMMAPLASTY  03/2013    TONSILLECTOMY  06/2011    TONSILLECTOMY      WISDOM TOOTH EXTRACTION         Current Outpatient Medications   Medication Sig Dispense Refill    amphetamine-dextroamphetamine (ADDERALL) 20 mg tablet   0    buPROPion (WELLBUTRIN SR) 150 mg 12 hr tablet Take 150 mg by mouth 2 (two) times a day       cyclobenzaprine (FLEXERIL) 5 mg tablet Take 2 tablets (10 mg total) by mouth daily at bedtime 180 tablet 3    diazepam (VALIUM) 5 mg tablet Take 5 mg by mouth every 6 (six) hours as needed       fexofenadine (ALLEGRA) 180 MG tablet Take 180 mg by mouth daily      FLUoxetine (PROzac) 10 mg capsule       Ibuprofen 200 MG CAPS Take 800 mg by mouth every 6 (six) hours as needed      levothyroxine 100 mcg tablet Take 1 tablet (100 mcg total) by mouth daily 30 tablet 0    OLANZapine-FLUoxetine (SYMBYAX) 3-25 MG per capsule Take 1 capsule by mouth every evening      polyethylene glycol (MIRALAX) 17 g packet Take 17 g by mouth daily      SUMAtriptan (IMITREX) 100 mg tablet Take 1 tablet (100 mg total) by mouth once as needed for migraine for up to 1 dose Max 2/24 hours, 9/month 27 tablet 1    topiramate (TOPAMAX) 50 MG tablet Take 1 tablet (50 mg total) by mouth 2 (two) times a day 180 tablet 3     No current facility-administered medications for this visit  Allergies   Allergen Reactions    Oxcarbazepine Hives     Hives       Review of Systems   Constitutional: Positive for fever  HENT: Positive for congestion, postnasal drip, rhinorrhea and sinus pressure  Eyes: Negative  Respiratory: Negative  Cardiovascular: Negative  Gastrointestinal: Positive for abdominal pain and diarrhea  Endocrine: Negative  Genitourinary: Negative  Musculoskeletal: Negative  Skin: Negative  Allergic/Immunologic: Negative  Neurological: Negative  Hematological: Negative  Psychiatric/Behavioral: Negative  Physical Exam   Constitutional: She is oriented to person, place, and time  She appears well-developed and well-nourished  No distress  HENT:   Head: Normocephalic and atraumatic  Right Ear: External ear normal    Left Ear: External ear normal    Eyes: Conjunctivae are normal    Pulmonary/Chest: Effort normal  No respiratory distress  Neurological: She is alert and oriented to person, place, and time  Skin: She is not diaphoretic  I spent 10 minutes with the patient during this visit  HPI  Spoke with pt today through video duo google and she is still having fever mostly in the evening  Now she has a lot more head congestion and a lot of diarrhea and stomach cramping  Still no SOB or cough  Data to review:          COVID 19 Instructions    Maria Isabel Goodman was advised to limit contact with others to essential tasks such as getting food, medications, and medical care  Proper handwashing reviewed, and Hand sanitzer when washing is not available  If the patient develops symptoms of COVID 19, the patient should call the office as soon as possible  Patient was offered video visit today  Patient accepted video visit  Examination today was by video/telephone visit  Observational measures that are included were noted on video or by patient/guardian description  Vital signs are include in this visit were obtained by the patient, using their own equipment at home

## 2020-03-30 NOTE — PATIENT INSTRUCTIONS
Problem List Items Addressed This Visit        Other    Viral illness - Primary     Pt was informed today via video visit that her covid testing was negative  She is still having fevers mostly in the evening and has developed more head congestion and diarrhea with abdominal cramping  She is to treat her symptoms as still viral and she will be able to RTW when she goes 3 days without a fever off fever reducing medications  She will notify me when she will be returning to work and then I will make her a note for clearance  PT agreeable  BRAT diet increase in fluids and rest Avoid dairy and caffeen

## 2020-04-02 ENCOUNTER — TELEPHONE (OUTPATIENT)
Dept: NEUROLOGY | Facility: CLINIC | Age: 30
End: 2020-04-02

## 2020-04-02 ENCOUNTER — OFFICE VISIT (OUTPATIENT)
Dept: NEUROLOGY | Facility: CLINIC | Age: 30
End: 2020-04-02
Payer: COMMERCIAL

## 2020-04-02 ENCOUNTER — DOCUMENTATION (OUTPATIENT)
Dept: NEUROLOGY | Facility: CLINIC | Age: 30
End: 2020-04-02

## 2020-04-02 VITALS
WEIGHT: 119 LBS | BODY MASS INDEX: 22.47 KG/M2 | HEART RATE: 96 BPM | DIASTOLIC BLOOD PRESSURE: 79 MMHG | HEIGHT: 61 IN | SYSTOLIC BLOOD PRESSURE: 123 MMHG

## 2020-04-02 DIAGNOSIS — G56.23 ULNAR NEUROPATHY OF BOTH UPPER EXTREMITIES: Primary | ICD-10-CM

## 2020-04-02 DIAGNOSIS — M79.18 CERVICAL MYOFASCIAL PAIN SYNDROME: ICD-10-CM

## 2020-04-02 PROCEDURE — 20553 NJX 1/MLT TRIGGER POINTS 3/>: CPT | Performed by: PHYSICAL MEDICINE & REHABILITATION

## 2020-04-02 PROCEDURE — 3008F BODY MASS INDEX DOCD: CPT | Performed by: PHYSICAL MEDICINE & REHABILITATION

## 2020-04-02 PROCEDURE — 99204 OFFICE O/P NEW MOD 45 MIN: CPT | Performed by: PHYSICAL MEDICINE & REHABILITATION

## 2020-04-02 PROCEDURE — 1036F TOBACCO NON-USER: CPT | Performed by: PHYSICAL MEDICINE & REHABILITATION

## 2020-04-02 RX ORDER — LIDOCAINE HYDROCHLORIDE 10 MG/ML
5 INJECTION, SOLUTION INFILTRATION; PERINEURAL ONCE
Status: COMPLETED | OUTPATIENT
Start: 2020-04-02 | End: 2020-04-02

## 2020-04-02 RX ADMIN — LIDOCAINE HYDROCHLORIDE 5 ML: 10 INJECTION, SOLUTION INFILTRATION; PERINEURAL at 13:10

## 2020-04-09 ENCOUNTER — TELEPHONE (OUTPATIENT)
Dept: OBGYN CLINIC | Facility: HOSPITAL | Age: 30
End: 2020-04-09

## 2020-04-17 ENCOUNTER — TELEPHONE (OUTPATIENT)
Dept: OBGYN CLINIC | Facility: CLINIC | Age: 30
End: 2020-04-17

## 2020-04-22 ENCOUNTER — OFFICE VISIT (OUTPATIENT)
Dept: FAMILY MEDICINE CLINIC | Facility: CLINIC | Age: 30
End: 2020-04-22
Payer: COMMERCIAL

## 2020-04-22 ENCOUNTER — HOSPITAL ENCOUNTER (OUTPATIENT)
Dept: RADIOLOGY | Facility: HOSPITAL | Age: 30
Discharge: HOME | End: 2020-04-22
Payer: COMMERCIAL

## 2020-04-22 VITALS
SYSTOLIC BLOOD PRESSURE: 110 MMHG | BODY MASS INDEX: 23.56 KG/M2 | HEART RATE: 72 BPM | DIASTOLIC BLOOD PRESSURE: 66 MMHG | TEMPERATURE: 97.7 F | RESPIRATION RATE: 16 BRPM | HEIGHT: 61 IN | WEIGHT: 124.8 LBS

## 2020-04-22 DIAGNOSIS — N30.00 ACUTE CYSTITIS WITHOUT HEMATURIA: ICD-10-CM

## 2020-04-22 DIAGNOSIS — R10.9 BILATERAL FLANK PAIN: ICD-10-CM

## 2020-04-22 DIAGNOSIS — R35.0 URINE FREQUENCY: Primary | ICD-10-CM

## 2020-04-22 DIAGNOSIS — N20.0 RENAL CALCULI: Primary | ICD-10-CM

## 2020-04-22 DIAGNOSIS — R35.0 URINE FREQUENCY: ICD-10-CM

## 2020-04-22 DIAGNOSIS — R10.9 ABDOMINAL PAIN, UNSPECIFIED ABDOMINAL LOCATION: ICD-10-CM

## 2020-04-22 DIAGNOSIS — R30.0 DYSURIA: ICD-10-CM

## 2020-04-22 LAB
SL AMB  POCT GLUCOSE, UA: 100
SL AMB LEUKOCYTE ESTERASE,UA: ABNORMAL
SL AMB POCT BILIRUBIN,UA: ABNORMAL
SL AMB POCT BLOOD,UA: ABNORMAL
SL AMB POCT CLARITY,UA: ABNORMAL
SL AMB POCT COLOR,UA: ABNORMAL
SL AMB POCT KETONES,UA: ABNORMAL
SL AMB POCT NITRITE,UA: ABNORMAL
SL AMB POCT PH,UA: 6
SL AMB POCT SPECIFIC GRAVITY,UA: 1.02
SL AMB POCT URINE PROTEIN: 100
SL AMB POCT UROBILINOGEN: 4

## 2020-04-22 PROCEDURE — 1036F TOBACCO NON-USER: CPT | Performed by: FAMILY MEDICINE

## 2020-04-22 PROCEDURE — 76770 US EXAM ABDO BACK WALL COMP: CPT

## 2020-04-22 PROCEDURE — 87086 URINE CULTURE/COLONY COUNT: CPT | Performed by: FAMILY MEDICINE

## 2020-04-22 PROCEDURE — 81002 URINALYSIS NONAUTO W/O SCOPE: CPT | Performed by: FAMILY MEDICINE

## 2020-04-22 PROCEDURE — 99214 OFFICE O/P EST MOD 30 MIN: CPT | Performed by: FAMILY MEDICINE

## 2020-04-22 PROCEDURE — 3008F BODY MASS INDEX DOCD: CPT | Performed by: FAMILY MEDICINE

## 2020-04-22 RX ORDER — SULFAMETHOXAZOLE AND TRIMETHOPRIM 800; 160 MG/1; MG/1
1 TABLET ORAL EVERY 12 HOURS SCHEDULED
Qty: 14 TABLET | Refills: 0 | Status: SHIPPED | OUTPATIENT
Start: 2020-04-22 | End: 2020-04-29

## 2020-04-22 RX ORDER — PHENAZOPYRIDINE HYDROCHLORIDE 200 MG/1
200 TABLET, FILM COATED ORAL
Qty: 9 TABLET | Refills: 0 | Status: SHIPPED | OUTPATIENT
Start: 2020-04-22 | End: 2020-04-25

## 2020-04-23 ENCOUNTER — TELEPHONE (OUTPATIENT)
Dept: UROLOGY | Facility: AMBULATORY SURGERY CENTER | Age: 30
End: 2020-04-23

## 2020-04-23 LAB — BACTERIA UR CULT: ABNORMAL

## 2020-05-08 ENCOUNTER — PROCEDURE VISIT (OUTPATIENT)
Dept: NEUROLOGY | Facility: CLINIC | Age: 30
End: 2020-05-08
Payer: COMMERCIAL

## 2020-05-08 ENCOUNTER — TELEMEDICINE (OUTPATIENT)
Dept: FAMILY MEDICINE CLINIC | Facility: CLINIC | Age: 30
End: 2020-05-08
Payer: COMMERCIAL

## 2020-05-08 VITALS — BODY MASS INDEX: 23.6 KG/M2 | WEIGHT: 125 LBS | HEIGHT: 61 IN

## 2020-05-08 VITALS — TEMPERATURE: 99.3 F | SYSTOLIC BLOOD PRESSURE: 125 MMHG | DIASTOLIC BLOOD PRESSURE: 72 MMHG | HEART RATE: 102 BPM

## 2020-05-08 DIAGNOSIS — Z13.220 LIPID SCREENING: ICD-10-CM

## 2020-05-08 DIAGNOSIS — H04.123 DRY EYE SYNDROME, BILATERAL: Primary | ICD-10-CM

## 2020-05-08 DIAGNOSIS — R53.83 OTHER FATIGUE: ICD-10-CM

## 2020-05-08 DIAGNOSIS — M25.40 PAINFUL SWELLING OF JOINT: ICD-10-CM

## 2020-05-08 DIAGNOSIS — E03.9 ACQUIRED HYPOTHYROIDISM: ICD-10-CM

## 2020-05-08 DIAGNOSIS — M25.50 ARTHRALGIA, UNSPECIFIED JOINT: ICD-10-CM

## 2020-05-08 DIAGNOSIS — G43.701 CHRONIC MIGRAINE WITHOUT AURA WITH STATUS MIGRAINOSUS, NOT INTRACTABLE: Primary | ICD-10-CM

## 2020-05-08 DIAGNOSIS — M79.10 MYALGIA: ICD-10-CM

## 2020-05-08 DIAGNOSIS — Z00.00 HEALTHCARE MAINTENANCE: ICD-10-CM

## 2020-05-08 DIAGNOSIS — R50.9 FEVER IN ADULT: ICD-10-CM

## 2020-05-08 DIAGNOSIS — R23.8 EASY BRUISING: ICD-10-CM

## 2020-05-08 DIAGNOSIS — I73.00 RAYNAUD'S DISEASE WITHOUT GANGRENE: ICD-10-CM

## 2020-05-08 PROBLEM — B34.9 VIRAL ILLNESS: Status: RESOLVED | Noted: 2020-03-27 | Resolved: 2020-05-08

## 2020-05-08 PROCEDURE — 99214 OFFICE O/P EST MOD 30 MIN: CPT | Performed by: PHYSICIAN ASSISTANT

## 2020-05-08 PROCEDURE — 64615 CHEMODENERV MUSC MIGRAINE: CPT | Performed by: PHYSICIAN ASSISTANT

## 2020-05-09 ENCOUNTER — TRANSCRIBE ORDERS (OUTPATIENT)
Dept: LAB | Facility: CLINIC | Age: 30
End: 2020-05-09

## 2020-05-09 ENCOUNTER — APPOINTMENT (OUTPATIENT)
Dept: LAB | Facility: CLINIC | Age: 30
End: 2020-05-09
Payer: COMMERCIAL

## 2020-05-09 DIAGNOSIS — H04.123 DRY EYE SYNDROME, BILATERAL: ICD-10-CM

## 2020-05-09 DIAGNOSIS — M25.50 ARTHRALGIA, UNSPECIFIED JOINT: ICD-10-CM

## 2020-05-09 DIAGNOSIS — I73.00 RAYNAUD'S DISEASE WITHOUT GANGRENE: ICD-10-CM

## 2020-05-09 DIAGNOSIS — M25.40 PAINFUL SWELLING OF JOINT: ICD-10-CM

## 2020-05-09 LAB
25(OH)D3 SERPL-MCNC: 33.3 NG/ML (ref 30–100)
ALBUMIN SERPL BCP-MCNC: 3.8 G/DL (ref 3.5–5)
ALP SERPL-CCNC: 52 U/L (ref 46–116)
ALT SERPL W P-5'-P-CCNC: 31 U/L (ref 12–78)
ANION GAP SERPL CALCULATED.3IONS-SCNC: 9 MMOL/L (ref 4–13)
AST SERPL W P-5'-P-CCNC: 13 U/L (ref 5–45)
BASOPHILS # BLD AUTO: 0.04 THOUSANDS/ΜL (ref 0–0.1)
BASOPHILS NFR BLD AUTO: 1 % (ref 0–1)
BILIRUB SERPL-MCNC: 0.23 MG/DL (ref 0.2–1)
BUN SERPL-MCNC: 10 MG/DL (ref 5–25)
CALCIUM SERPL-MCNC: 8.6 MG/DL (ref 8.3–10.1)
CHLORIDE SERPL-SCNC: 104 MMOL/L (ref 100–108)
CHOLEST SERPL-MCNC: 209 MG/DL (ref 50–200)
CO2 SERPL-SCNC: 25 MMOL/L (ref 21–32)
CREAT SERPL-MCNC: 0.99 MG/DL (ref 0.6–1.3)
CRP SERPL QL: <3 MG/L
EOSINOPHIL # BLD AUTO: 0.16 THOUSAND/ΜL (ref 0–0.61)
EOSINOPHIL NFR BLD AUTO: 4 % (ref 0–6)
ERYTHROCYTE [DISTWIDTH] IN BLOOD BY AUTOMATED COUNT: 11.5 % (ref 11.6–15.1)
ERYTHROCYTE [SEDIMENTATION RATE] IN BLOOD: 4 MM/HOUR (ref 0–20)
EST. AVERAGE GLUCOSE BLD GHB EST-MCNC: 97 MG/DL
FERRITIN SERPL-MCNC: 48 NG/ML (ref 8–388)
GFR SERPL CREATININE-BSD FRML MDRD: 77 ML/MIN/1.73SQ M
GLUCOSE P FAST SERPL-MCNC: 92 MG/DL (ref 65–99)
HBA1C MFR BLD: 5 %
HCT VFR BLD AUTO: 39.4 % (ref 34.8–46.1)
HDLC SERPL-MCNC: 67 MG/DL
HGB BLD-MCNC: 13.4 G/DL (ref 11.5–15.4)
IMM GRANULOCYTES # BLD AUTO: 0.02 THOUSAND/UL (ref 0–0.2)
IMM GRANULOCYTES NFR BLD AUTO: 0 % (ref 0–2)
IRON SATN MFR SERPL: 39 %
IRON SERPL-MCNC: 107 UG/DL (ref 50–170)
LDLC SERPL CALC-MCNC: 131 MG/DL (ref 0–100)
LYMPHOCYTES # BLD AUTO: 1.76 THOUSANDS/ΜL (ref 0.6–4.47)
LYMPHOCYTES NFR BLD AUTO: 38 % (ref 14–44)
MCH RBC QN AUTO: 31 PG (ref 26.8–34.3)
MCHC RBC AUTO-ENTMCNC: 34 G/DL (ref 31.4–37.4)
MCV RBC AUTO: 91 FL (ref 82–98)
MONOCYTES # BLD AUTO: 0.27 THOUSAND/ΜL (ref 0.17–1.22)
MONOCYTES NFR BLD AUTO: 6 % (ref 4–12)
NEUTROPHILS # BLD AUTO: 2.35 THOUSANDS/ΜL (ref 1.85–7.62)
NEUTS SEG NFR BLD AUTO: 51 % (ref 43–75)
NONHDLC SERPL-MCNC: 142 MG/DL
NRBC BLD AUTO-RTO: 0 /100 WBCS
PLATELET # BLD AUTO: 233 THOUSANDS/UL (ref 149–390)
PMV BLD AUTO: 10.9 FL (ref 8.9–12.7)
POTASSIUM SERPL-SCNC: 3.5 MMOL/L (ref 3.5–5.3)
PROT SERPL-MCNC: 7.2 G/DL (ref 6.4–8.2)
PTH-INTACT SERPL-MCNC: 18.1 PG/ML (ref 18.4–80.1)
RBC # BLD AUTO: 4.32 MILLION/UL (ref 3.81–5.12)
SODIUM SERPL-SCNC: 138 MMOL/L (ref 136–145)
TIBC SERPL-MCNC: 272 UG/DL (ref 250–450)
TRIGL SERPL-MCNC: 53 MG/DL
TSH SERPL DL<=0.05 MIU/L-ACNC: 3.23 UIU/ML (ref 0.36–3.74)
URATE SERPL-MCNC: 2.9 MG/DL (ref 2–6.8)
WBC # BLD AUTO: 4.6 THOUSAND/UL (ref 4.31–10.16)

## 2020-05-09 PROCEDURE — 86376 MICROSOMAL ANTIBODY EACH: CPT | Performed by: PHYSICIAN ASSISTANT

## 2020-05-09 PROCEDURE — 85652 RBC SED RATE AUTOMATED: CPT

## 2020-05-09 PROCEDURE — 86235 NUCLEAR ANTIGEN ANTIBODY: CPT

## 2020-05-09 PROCEDURE — 83970 ASSAY OF PARATHORMONE: CPT | Performed by: PHYSICIAN ASSISTANT

## 2020-05-09 PROCEDURE — 84443 ASSAY THYROID STIM HORMONE: CPT | Performed by: PHYSICIAN ASSISTANT

## 2020-05-09 PROCEDURE — 86618 LYME DISEASE ANTIBODY: CPT | Performed by: PHYSICIAN ASSISTANT

## 2020-05-09 PROCEDURE — 86038 ANTINUCLEAR ANTIBODIES: CPT | Performed by: PHYSICIAN ASSISTANT

## 2020-05-09 PROCEDURE — 80061 LIPID PANEL: CPT | Performed by: PHYSICIAN ASSISTANT

## 2020-05-09 PROCEDURE — 86800 THYROGLOBULIN ANTIBODY: CPT | Performed by: PHYSICIAN ASSISTANT

## 2020-05-09 PROCEDURE — 84550 ASSAY OF BLOOD/URIC ACID: CPT

## 2020-05-09 PROCEDURE — 85025 COMPLETE CBC W/AUTO DIFF WBC: CPT | Performed by: PHYSICIAN ASSISTANT

## 2020-05-09 PROCEDURE — 80053 COMPREHEN METABOLIC PANEL: CPT | Performed by: PHYSICIAN ASSISTANT

## 2020-05-09 PROCEDURE — 83550 IRON BINDING TEST: CPT | Performed by: PHYSICIAN ASSISTANT

## 2020-05-09 PROCEDURE — 36415 COLL VENOUS BLD VENIPUNCTURE: CPT | Performed by: PHYSICIAN ASSISTANT

## 2020-05-09 PROCEDURE — 83540 ASSAY OF IRON: CPT | Performed by: PHYSICIAN ASSISTANT

## 2020-05-09 PROCEDURE — 82728 ASSAY OF FERRITIN: CPT | Performed by: PHYSICIAN ASSISTANT

## 2020-05-09 PROCEDURE — 86038 ANTINUCLEAR ANTIBODIES: CPT

## 2020-05-09 PROCEDURE — 86140 C-REACTIVE PROTEIN: CPT

## 2020-05-09 PROCEDURE — 86430 RHEUMATOID FACTOR TEST QUAL: CPT | Performed by: PHYSICIAN ASSISTANT

## 2020-05-09 PROCEDURE — 82306 VITAMIN D 25 HYDROXY: CPT | Performed by: PHYSICIAN ASSISTANT

## 2020-05-09 PROCEDURE — 83036 HEMOGLOBIN GLYCOSYLATED A1C: CPT | Performed by: PHYSICIAN ASSISTANT

## 2020-05-10 LAB — THYROPEROXIDASE AB SERPL-ACNC: <9 IU/ML (ref 0–34)

## 2020-05-11 LAB
B BURGDOR IGG+IGM SER-ACNC: <0.91 ISR (ref 0–0.9)
ENA SS-A AB SER-ACNC: <0.2 AI (ref 0–0.9)
ENA SS-B AB SER-ACNC: <0.2 AI (ref 0–0.9)
RHEUMATOID FACT SER QL LA: NEGATIVE

## 2020-05-12 ENCOUNTER — TELEPHONE (OUTPATIENT)
Dept: OBGYN CLINIC | Facility: MEDICAL CENTER | Age: 30
End: 2020-05-12

## 2020-05-12 LAB
RYE IGE QN: NEGATIVE
THYROGLOB AB SERPL-ACNC: <1 IU/ML (ref 0–0.9)

## 2020-05-13 LAB — MISCELLANEOUS LAB TEST RESULT: NORMAL

## 2020-05-27 ENCOUNTER — TELEPHONE (OUTPATIENT)
Dept: NEUROLOGY | Facility: CLINIC | Age: 30
End: 2020-05-27

## 2020-05-28 ENCOUNTER — TELEPHONE (OUTPATIENT)
Dept: NEUROLOGY | Facility: CLINIC | Age: 30
End: 2020-05-28

## 2020-06-04 ENCOUNTER — TELEPHONE (OUTPATIENT)
Dept: OBGYN CLINIC | Facility: CLINIC | Age: 30
End: 2020-06-04

## 2020-06-04 DIAGNOSIS — R30.0 BURNING WITH URINATION: Primary | ICD-10-CM

## 2020-06-04 RX ORDER — NITROFURANTOIN 25; 75 MG/1; MG/1
100 CAPSULE ORAL 2 TIMES DAILY
Qty: 7 CAPSULE | Refills: 0 | Status: ON HOLD | OUTPATIENT
Start: 2020-06-04 | End: 2022-06-01

## 2020-07-02 ENCOUNTER — DOCUMENTATION (OUTPATIENT)
Dept: NEUROLOGY | Facility: CLINIC | Age: 30
End: 2020-07-02

## 2020-07-02 NOTE — PROGRESS NOTES
Patient is scheduled with Ashly Cobly on 8/7/2020 in [facility] location

## 2020-07-02 NOTE — PROGRESS NOTES
Type Date User Summary Attachment   General 07/01/2020 10:40 AM Candie Gutierrez care coordination  -   Note    Botox- authorization #: [id number]- 3rd visit- valid from 1/31/2020 until 1/31/2021   Please use our stock      Thank you,     Kelley Ward

## 2020-07-14 ENCOUNTER — TELEMEDICINE (OUTPATIENT)
Dept: FAMILY MEDICINE CLINIC | Facility: CLINIC | Age: 30
End: 2020-07-14
Payer: COMMERCIAL

## 2020-07-14 VITALS — BODY MASS INDEX: 23.24 KG/M2 | WEIGHT: 123 LBS | TEMPERATURE: 100 F

## 2020-07-14 DIAGNOSIS — Z20.828 EXPOSURE TO SARS-ASSOCIATED CORONAVIRUS: Primary | ICD-10-CM

## 2020-07-14 DIAGNOSIS — Z20.828 EXPOSURE TO SARS-ASSOCIATED CORONAVIRUS: ICD-10-CM

## 2020-07-14 DIAGNOSIS — R50.9 FEVER IN ADULT: ICD-10-CM

## 2020-07-14 PROCEDURE — 99213 OFFICE O/P EST LOW 20 MIN: CPT | Performed by: FAMILY MEDICINE

## 2020-07-14 PROCEDURE — U0003 INFECTIOUS AGENT DETECTION BY NUCLEIC ACID (DNA OR RNA); SEVERE ACUTE RESPIRATORY SYNDROME CORONAVIRUS 2 (SARS-COV-2) (CORONAVIRUS DISEASE [COVID-19]), AMPLIFIED PROBE TECHNIQUE, MAKING USE OF HIGH THROUGHPUT TECHNOLOGIES AS DESCRIBED BY CMS-2020-01-R: HCPCS

## 2020-07-14 PROCEDURE — 1036F TOBACCO NON-USER: CPT | Performed by: FAMILY MEDICINE

## 2020-07-14 RX ORDER — LAMOTRIGINE 100 MG/1
150 TABLET ORAL DAILY
COMMUNITY
Start: 2020-07-14 | End: 2021-06-11 | Stop reason: SDUPTHER

## 2020-07-14 NOTE — ASSESSMENT & PLAN NOTE
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

## 2020-07-14 NOTE — PROGRESS NOTES
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  I wouold recommend test for completeness, but likely just VURI  Not all of COVID 19 symptoms  Relevant Orders    MISC COVID-19 TEST- Collected at [facility]    Fever in adult     Patient has had low grade fever for a long time  I would recommend she follow with Rheum as sched  Reason for visit is   Chief Complaint   Patient presents with    Fever     pt was sent home from work due to a fever it was 100 3   Generalized Body Aches    Headache    Fatigue    Virtual Regular Visit        Encounter provider Rush Arora MD    Provider located at [address] [place] [address] PRIMARY CARE  [address] [place] [address]      Recent Visits  No visits were found meeting these conditions  Showing recent visits within past 7 days and meeting all other requirements     Today's Visits  Date Type Provider Dept   07/14/20 Telemedicine Rush Arora MD [facility]   Showing today's visits and meeting all other requirements     Future Appointments  No visits were found meeting these conditions  Showing future appointments within next 150 days and meeting all other requirements        The patient was identified by name and date of birth  Daniel Montes was informed that this is a telemedicine visit and that the visit is being conducted through [employer] and patient was informed that this is not a secure, HIPAA-complaint platform  She agrees to proceed     My office door was closed  No one else was in the room  She acknowledged consent and understanding of privacy and security of the video platform  The patient has agreed to participate and understands they can discontinue the visit at any time  Patient is aware this is a billable service  Abhi Momin is a 27 y o  female   Chief Complaint   Patient presents with    Fever     pt was sent home from work due to a fever it was 100 3   Generalized Body Aches    Headache    Fatigue    Virtual Regular Visit        Patient was sent home  She had temp to 100 3, so went home  Some HA for a few days, but not new  Some fatigue  Some lightheaded  No SOB/NEWMAN  Nausea this AM  No V  Some loose BM, but has had before  Smell/taste: She has had some changes, but had piercing recently that was bothering her             Past Medical History:   Diagnosis Date    Anxiety     Depression     Migraines        Past Surgical History:   Procedure Laterality Date    INGUINAL HERNIA REPAIR  10/1998    REDUCTION MAMMAPLASTY  03/2013    TONSILLECTOMY  06/2011    TONSILLECTOMY      WISDOM TOOTH EXTRACTION         Current Outpatient Medications   Medication Sig Dispense Refill    amphetamine-dextroamphetamine (ADDERALL) 20 mg tablet   0    buPROPion (WELLBUTRIN SR) 150 mg 12 hr tablet Take 150 mg by mouth 2 (two) times a day       cyclobenzaprine (FLEXERIL) 5 mg tablet Take 2 tablets (10 mg total) by mouth daily at bedtime 180 tablet 3    diazepam (VALIUM) 5 mg tablet Take 5 mg by mouth every 6 (six) hours as needed       fexofenadine (ALLEGRA) 180 MG tablet Take 180 mg by mouth daily      FLUoxetine (PROzac) 10 mg capsule       Ibuprofen 200 MG CAPS Take 800 mg by mouth every 6 (six) hours as needed      lamoTRIgine (LaMICtal) 100 mg tablet       levothyroxine 100 mcg tablet Take 1 tablet (100 mcg total) by mouth daily 30 tablet 11    nitrofurantoin (MACROBID) 100 mg capsule Take 1 capsule (100 mg total) by mouth 2 (two) times a day 7 capsule 0    OLANZapine-FLUoxetine (SYMBYAX) 3-25 MG per capsule Take 1 capsule by mouth every evening      polyethylene glycol (MIRALAX) 17 g packet Take 17 g by mouth daily      SUMAtriptan (IMITREX) 100 mg tablet Take 1 tablet (100 mg total) by mouth once as needed for migraine for up to 1 dose Max 2/24 hours, 9/month 27 tablet 1    topiramate (TOPAMAX) 50 MG tablet Take 1 tablet (50 mg total) by mouth 2 (two) times a day 180 tablet 3     No current facility-administered medications for this visit  Allergies   Allergen Reactions    Oxcarbazepine Hives     Hives       Review of Systems   Constitutional: Positive for fatigue and fever  HENT: Negative  Eyes: Negative  Respiratory: Negative  Cardiovascular: Negative  Gastrointestinal: Negative  Genitourinary: Negative  Musculoskeletal: Negative  Skin: Negative  Neurological: Negative  Hematological: Negative  Psychiatric/Behavioral: Negative  All other systems reviewed and are negative  Video Exam    Vitals:    07/14/20 1449   Temp: 100 °F (37 8 °C)   TempSrc: Tympanic   Weight: 55 8 kg (123 lb)       Physical Exam   Constitutional: She appears well-developed and well-nourished  No distress  HENT:   Head: Normocephalic and atraumatic  Pulmonary/Chest: Effort normal and breath sounds normal    Nursing note reviewed  I spent 15 minutes directly with the patient during this visit      VIRTUAL VISIT [employer] acknowledges that she has consented to an online visit or consultation  She understands that the online visit is based solely on information provided by her, and that, in the absence of a face-to-face physical evaluation by the physician, the diagnosis she receives is both limited and provisional in terms of accuracy and completeness  This is not intended to replace a full medical face-to-face evaluation by the physician  Maria Isabel Goodman understands and accepts these terms

## 2020-07-14 NOTE — PATIENT INSTRUCTIONS
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  I gabrielld recommend test for completeness, but likely just VURI  Not all of COVID 19 symptoms  Relevant Orders    MISC COVID-19 TEST- Collected at [facility]    Fever in adult     Patient has had low grade fever for a long time  I would recommend she follow with Rheum as sched  COVID 19 Instructions    Maria Isabel Goodman was advised to limit contact with others to essential tasks such as getting food, medications, and medical care  Proper handwashing reviewed, and Hand sanitzer when washing is not available  If the patient develops symptoms of COVID 19, the patient should call the office as soon as possible  Please try to download [employer] Du[employer]  Once you do download this on your phone, you will be prompted to add your phone number to the account  After that, he should receive a text from [employer], and use that code to verify your phone number  After that, you should be able to use [employer] Duo to receive and make video calls  Please download [employer] Teams to your phone or computer  We will be transitioning to this platform for Video Visits  Instructions for downloading this are available from the office

## 2020-07-19 LAB
INPATIENT: NORMAL
SARS-COV-2 RNA SPEC QL NAA+PROBE: NOT DETECTED

## 2020-08-07 ENCOUNTER — PROCEDURE VISIT (OUTPATIENT)
Dept: NEUROLOGY | Facility: CLINIC | Age: 30
End: 2020-08-07
Payer: COMMERCIAL

## 2020-08-07 VITALS — DIASTOLIC BLOOD PRESSURE: 79 MMHG | TEMPERATURE: 97.7 F | HEART RATE: 86 BPM | SYSTOLIC BLOOD PRESSURE: 128 MMHG

## 2020-08-07 DIAGNOSIS — G43.701 CHRONIC MIGRAINE WITHOUT AURA WITH STATUS MIGRAINOSUS, NOT INTRACTABLE: Primary | ICD-10-CM

## 2020-08-07 PROCEDURE — 64615 CHEMODENERV MUSC MIGRAINE: CPT | Performed by: PHYSICIAN ASSISTANT

## 2020-08-07 NOTE — PROGRESS NOTES
Chemodenervation    Date/Time: 8/7/2020 1:56 PM  Performed by: Alfa Issa PA-C  Authorized by: Alfa Issa PA-C     Pre-procedure details:     Prepped With: Alcohol    Anesthesia (see MAR for exact dosages): Anesthesia method:  None  Procedure details:     Position:  Upright  Botox:     Botox Type:  Type A    Brand:  Botox    mL's of Botulinum Toxin:  155    Final Concentration per CC:  200 units    Needle Gauge:  30 G 2 5 inch  Procedures:     Botox Procedures: chronic headache      Indications: migraines    Injection Location:     Head / Face:  L superior cervical paraspinal, R superior cervical paraspinal, L , R , L frontalis, R frontalis, L medial occipitalis, R medial occipitalis, procerus, R temporalis, L temporalis, L superior trapezius and R superior trapezius    L  injection amount:  5 unit(s)    R  injection amount:  5 unit(s)    L lateral frontalis:  5 unit(s)    R lateral frontalis:  5 unit(s)    L medial frontalis:  5 unit(s)    R medial frontalis:  5 unit(s)    L temporalis injection amount:  20 unit(s)    R temporalis injection amount:  20 unit(s)    Procerus injection amount:  5 unit(s)    L medial occipitalis injection amount:  15 unit(s)    R medial occipitalis injection amount:  15 unit(s)    L superior cervical paraspinal injection amount:  10 unit(s)    R superior cervical paraspinal injection amount:  10 unit(s)    L superior trapezius injection amount:  15 unit(s)    R superior trapezius injection amount:  15 unit(s)  Total Units:     Total units used:  155    Total units discarded:  45  Post-procedure details:     Chemodenervation:  Chronic migraine    Facial Nerve Location[de-identified]  Bilateral facial nerve    Patient tolerance of procedure:   Tolerated well, no immediate complications

## 2020-08-07 NOTE — LETTER
August 7, 2020     Patient: Leigh Search   YOB: 1990   Date of Visit: 8/7/2020       To Whom it May Concern:    Excell Andres is under my professional care  She was seen in my office on 8/7/2020  Please excuse patient on 8/6/2020 and 8/7/2020  She may return to work on 8/8/2020  If you have any questions or concerns, please don't hesitate to call           Sincerely,          Jaydon Lima PA-C        CC: No Recipients

## 2020-08-07 NOTE — LETTER
August 7, 2020     Patient: Liset Levi   YOB: 1990   Date of Visit: 8/7/2020       To Whom it May Concern:    Desiree Rajendra is under my professional care  She was seen in my office on 8/7/2020  Pleae excuse her for 8/6/2020 and 8/7/2020  She may return to work on 8/8/2020  If you have any questions or concerns, please don't hesitate to call           Sincerely,          Annalee Vila PA-C        CC: No Recipients

## 2020-08-28 ENCOUNTER — OFFICE VISIT (OUTPATIENT)
Dept: RHEUMATOLOGY | Facility: CLINIC | Age: 30
End: 2020-08-28
Payer: COMMERCIAL

## 2020-08-28 ENCOUNTER — APPOINTMENT (OUTPATIENT)
Dept: RADIOLOGY | Facility: MEDICAL CENTER | Age: 30
End: 2020-08-28
Payer: COMMERCIAL

## 2020-08-28 ENCOUNTER — APPOINTMENT (OUTPATIENT)
Dept: LAB | Facility: MEDICAL CENTER | Age: 30
End: 2020-08-28
Payer: COMMERCIAL

## 2020-08-28 VITALS
TEMPERATURE: 97.7 F | HEIGHT: 61 IN | WEIGHT: 127.4 LBS | BODY MASS INDEX: 24.05 KG/M2 | DIASTOLIC BLOOD PRESSURE: 78 MMHG | SYSTOLIC BLOOD PRESSURE: 120 MMHG

## 2020-08-28 DIAGNOSIS — M25.50 ARTHRALGIA, UNSPECIFIED JOINT: ICD-10-CM

## 2020-08-28 DIAGNOSIS — I73.00 RAYNAUD'S DISEASE WITHOUT GANGRENE: ICD-10-CM

## 2020-08-28 DIAGNOSIS — H04.123 DRY EYE SYNDROME, BILATERAL: ICD-10-CM

## 2020-08-28 DIAGNOSIS — M25.50 PAIN IN JOINT, MULTIPLE SITES: Primary | ICD-10-CM

## 2020-08-28 DIAGNOSIS — R53.83 OTHER FATIGUE: ICD-10-CM

## 2020-08-28 DIAGNOSIS — M25.50 ARTHRALGIA, UNSPECIFIED JOINT: Primary | ICD-10-CM

## 2020-08-28 LAB
CRP SERPL QL: <3 MG/L
ERYTHROCYTE [SEDIMENTATION RATE] IN BLOOD: 5 MM/HOUR (ref 0–19)
FERRITIN SERPL-MCNC: 40 NG/ML (ref 8–388)

## 2020-08-28 PROCEDURE — 86200 CCP ANTIBODY: CPT | Performed by: INTERNAL MEDICINE

## 2020-08-28 PROCEDURE — 73130 X-RAY EXAM OF HAND: CPT

## 2020-08-28 PROCEDURE — 36415 COLL VENOUS BLD VENIPUNCTURE: CPT | Performed by: INTERNAL MEDICINE

## 2020-08-28 PROCEDURE — 86140 C-REACTIVE PROTEIN: CPT | Performed by: INTERNAL MEDICINE

## 2020-08-28 PROCEDURE — 82728 ASSAY OF FERRITIN: CPT | Performed by: INTERNAL MEDICINE

## 2020-08-28 PROCEDURE — 86038 ANTINUCLEAR ANTIBODIES: CPT

## 2020-08-28 PROCEDURE — 72200 X-RAY EXAM SI JOINTS: CPT

## 2020-08-28 PROCEDURE — 85652 RBC SED RATE AUTOMATED: CPT | Performed by: INTERNAL MEDICINE

## 2020-08-28 PROCEDURE — 99244 OFF/OP CNSLTJ NEW/EST MOD 40: CPT | Performed by: INTERNAL MEDICINE

## 2020-08-28 PROCEDURE — 86235 NUCLEAR ANTIGEN ANTIBODY: CPT | Performed by: INTERNAL MEDICINE

## 2020-08-28 PROCEDURE — 86225 DNA ANTIBODY NATIVE: CPT | Performed by: INTERNAL MEDICINE

## 2020-08-28 PROCEDURE — 81374 HLA I TYPING 1 ANTIGEN LR: CPT | Performed by: INTERNAL MEDICINE

## 2020-08-28 RX ORDER — CELECOXIB 100 MG/1
100 CAPSULE ORAL 2 TIMES DAILY
Qty: 60 CAPSULE | Refills: 3 | Status: SHIPPED | OUTPATIENT
Start: 2020-08-28 | End: 2020-09-04

## 2020-08-28 NOTE — PATIENT INSTRUCTIONS
Do labs  Do x-rays  Can take celecoxib twice a day as needed for joint pain    Return to clinic in 2 months    Autoimmune Disease   AMBULATORY CARE:   An autoimmune disease  causes your body's immune system to attack healthy cells in your body by mistake  This causes inflammation in the affected areas  The disease may affect any part of your body, including skin, organs, blood, your digestive system, and connective tissues  There are many autoimmune diseases, such as lupus, celiac disease, and diabetes  Common signs and symptoms:  Signs and symptoms depend on the type of autoimmune disease and the body systems affected  Symptoms may be mild or severe, and may come and go  You may have many of the following if you have an autoimmune disease that affects more than one body system:  · Red, warm, painful, swollen area or joints    · Joint pain, stiffness, or reduced range of motion    · Tiredness, weakness, or muscle pain    · Fever    · Weight gain or loss, or no appetite     · Diarrhea, stomach cramps, or bloating    · Hair loss    · Rash or changes in skin color    · Red, inflamed eyes  Seek care immediately if:   · You have severe pain or swelling  · You have a fever along with stiffness and pain  · You have blood in your urine, bowel movement, or vomit  · You have severe abdominal pain  · You are confused or feel dizzy or faint  Contact your healthcare provider if:   · You have new or worsening symptoms  · You are urinating less than usual     · You are bleeding from your nose or gums  · You bruise easily  · You have questions or concerns about your condition or care  Treatment  will depend on the type of autoimmune disease you have  You may need any of the following:  · Medicines  may be given to replace thyroid hormones, insulin, or other hormones your body is not producing   Medicines may also reduce your immune system's ability to attack healthy cells or decrease inflammation in muscles or joints  You may need to use topical creams or lotions to control a rash or other symptoms that affect your skin  · Prescription pain medicine  may be given  Ask your healthcare provider how to take this medicine safely  · NSAIDs , such as ibuprofen, help decrease swelling, pain, and fever  This medicine is available with or without a doctor's order  NSAIDs can cause stomach bleeding or kidney problems in certain people  If you take blood thinner medicine, always ask your healthcare provider if NSAIDs are safe for you  Always read the medicine label and follow directions  · Acetaminophen  reduces pain and fever  This medicine is available without a doctor's order  Ask how much to take and how often to take it  Follow directions  Acetaminophen can cause liver damage if not taken correctly  · Steroids  help reduce swelling and relieve pain  · A blood transfusion  may be needed if your blood is affected  Manage an autoimmune disease:   · Rest as needed  Talk to your healthcare provider if you are having trouble sleeping because of pain or other symptoms  Rest your joints if they are stiff or painful  Your healthcare provider may suggest support devices such as crutches or splints to help your joints rest      · Eat a variety of healthy foods  Healthy foods include fruits, vegetables, lean meats, fish, and low-fat dairy products  Work with your healthcare provider or dietitian to create healthy meal plans  You may need to stop eating certain foods if your digestive system is affected by your autoimmune disease  · Go to physical or occupational therapy as directed  A physical therapist can help you create an exercise plan  Exercise may help increase your energy  Exercise can also help keep stiff joints flexible and increase range of motion  An occupational therapist can help you learn to do your daily activities when you have pain or swelling during a flare  · Do not smoke    Nicotine can damage blood vessels and make it more difficult to manage your symptoms  Ask your healthcare provider for information if you currently smoke and need help to quit  E-cigarettes or smokeless tobacco still contain nicotine  Talk to your healthcare provider before you use these products  · Talk to your healthcare provider about pregnancy  If you are a woman and want to get pregnant, talk to your healthcare provider  You or your baby might be at risk for complications  You may need to wait until your disease is controlled or your medications are finished before you get pregnant  You may also have trouble getting pregnant because of your disease  Your healthcare provider may be able to suggest ways to improve your ability to become pregnant  · Manage stress  Stress may slow healing and lead to illness  Learn ways to control stress, such as relaxation, deep breathing, or listening to music  Manage flares:  A flare means something triggered your symptoms  Stress, cold weather, and sunlight are examples of triggers  Your healthcare provider can help you create a management plan that includes what to do if you have a flare  Treat flares quickly to help prevent serious illness  · Apply ice or heat as directed  Ice helps reduce pain and swelling, and may help prevent tissue damage  Use a cold compress, or put crushed ice in a bag  Cover it with a towel and apply to the painful area for 15 to 20 minutes every hour, or as directed  Heat helps reduce pain and muscle spasms  Apply a warm compress to the area for 20 minutes every 2 hours, or as directed  · Elevate the area above the level of your heart  Elevation can help reduce swelling and pain, especially in your joints  Elevate the area as often as possible  Follow up with your healthcare provider as directed: You may need ongoing tests or treatment  Write down your questions so you remember to ask them during your visits    © 2017 [employer]  Information is for End User's use only and may not be sold, redistributed or otherwise used for commercial purposes  All illustrations and images included in CareNotes® are the copyrighted property of [employer]  or [employer]  The above information is an  only  It is not intended as medical advice for individual conditions or treatments  Talk to your doctor, nurse or pharmacist before following any medical regimen to see if it is safe and effective for you

## 2020-08-28 NOTE — PROGRESS NOTES
Assessment and Plan: Joaquin Gutierrez is a 27 y o   female who presents as a Rheumatology consult referred by her PCP Vibha Beckman PA-C for evaluation of possible autoimmune disease  Patient admits to fevers, photosensitivity, arthralgia, myalgia, fatigue, mouth/nasal sores, sicca symptoms, Raynaud's symptoms, headaches, and malar erythema  Extensive autoimmune disease lab and x-ray work-up ordered below and returned unremarkable  Prescribed celecoxib 100mg po bid prn joint pain  Can also prescribe prednisone course to see if symptoms improve; if they do, then there likely is an inflammatory process going on  Plan:  Diagnoses and all orders for this visit:    Arthralgia, unspecified joint  -     MISCELLANEOUS LAB TEST  -     Sedimentation rate, automated  -     C-reactive protein  -     Centromere Antibody  -     Cyclic citrul peptide antibody, IgG  -     HLA-B27 antigen  -     XR sacroiliac joints < 3 views; Future  -     XR hand 3+ vw left; Future  -     XR hand 3+ vw right; Future  -     Ferritin  -     celecoxib (CeleBREX) 100 mg capsule; Take 1 capsule (100 mg total) by mouth 2 (two) times a day  -     Anti-DNA antibody, double-stranded    Raynaud's disease without gangrene    Dry eye syndrome, bilateral    Other fatigue    Follow-up plan: Return to clinic in 2 months      HPI  Joaquin Gutierrez is a 27 y o   female who presents as a Rheumatology consult referred by her PCP Vibha Beckman PA-C for evaluation of possible autoimmune disease   Patient first saw [facility] at age of 21 for chronic pain, fatigue; had shoulder, back, and neck pain - had breast reduction; was told she had DDD and mild arthritis in SI joints, ulnar neuropathy and mild carpal tunnel; was told she had claw toes and hammer toes; constantly has hip, back, neck, and shoulder pain; 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6); 8 months ago, started getting low-grade fevers (100 3F), with associated joint pain, muscle pain, and fatigue; has had mouth sores whole life, lately getting nose sores frequently as well; dry eyes, dry mouth  Hands have been hurting, back pain constant for several years  Was on gabapentin in the past through Neurology 600mg tid, didn't help, made mood worse  Has tried Celebrex, which helped joint pain, Scelaxin, and Flexeril - takes at night sometimes; gets Botox injections for chronic migraines  Was tried on Cymbalta for two weeks, was not on it long enough to notice if it helped  Currently, neck, shoulders, low back, and hands bother most; gets leg cramping, worse at night  Has been taking Advil 600-800mg 1-2 times a day, has tried naproxen 500mg as well; Advil helped more; has tried meloxicam, but does not remember if it helped  Admits to photosensitivity with blistering rash, has scalp sores and flaking, face and ears get dry and flaky, gets malar eyrthema, sicca symptoms, hair thinning, Raynaud's symptoms of fingers and toes, gets occasional stabbing in back and chest on deep breathing, gets heartburn, no blood clots or miscarriages, easy bruising; feet and ankles get swollen, sometimes wrist and fingers  Has headaches/chronic migraine  Review of Systems  Review of Systems   Constitutional: Positive for fatigue and fever  Negative for chills and unexpected weight change  HENT: Positive for mouth sores  Negative for trouble swallowing  Dry mouth, nose sores   Eyes: Positive for pain  Negative for visual disturbance  Eye pain   Respiratory: Positive for chest tightness  Negative for cough and shortness of breath  Chest pain with deep breathing   Cardiovascular: Negative for chest pain and leg swelling  Gastrointestinal: Positive for constipation  Negative for abdominal pain, blood in stool, diarrhea and nausea  Genitourinary: Positive for frequency          Urine retention   Musculoskeletal: Positive for arthralgias, back pain, joint swelling, myalgias and neck pain  Skin: Positive for color change and rash  Neurological: Positive for dizziness, numbness and headaches  Negative for weakness  Hematological: Negative for adenopathy  Bruises/bleeds easily  Psychiatric/Behavioral: Negative for sleep disturbance         Allergies  Allergies   Allergen Reactions    Oxcarbazepine Hives     Hives       Home Medications    Current Outpatient Medications:     amphetamine-dextroamphetamine (ADDERALL) 20 mg tablet, Take 20 mg by mouth daily , Disp: , Rfl: 0    buPROPion (WELLBUTRIN SR) 150 mg 12 hr tablet, Take 150 mg by mouth 2 (two) times a day , Disp: , Rfl:     cyclobenzaprine (FLEXERIL) 5 mg tablet, Take 2 tablets (10 mg total) by mouth daily at bedtime (Patient taking differently: Take 10 mg by mouth as needed ), Disp: 180 tablet, Rfl: 3    diazepam (VALIUM) 5 mg tablet, Take 5 mg by mouth every 6 (six) hours as needed , Disp: , Rfl:     fexofenadine (ALLEGRA) 180 MG tablet, Take 180 mg by mouth daily, Disp: , Rfl:     FLUoxetine (PROzac) 10 mg capsule, Take 10 mg by mouth daily , Disp: , Rfl:     Ibuprofen 200 MG CAPS, Take 800 mg by mouth every 6 (six) hours as needed, Disp: , Rfl:     lamoTRIgine (LaMICtal) 100 mg tablet, Take 100 mg by mouth daily , Disp: , Rfl:     levothyroxine 100 mcg tablet, Take 1 tablet (100 mcg total) by mouth daily, Disp: 30 tablet, Rfl: 11    nitrofurantoin (MACROBID) 100 mg capsule, Take 1 capsule (100 mg total) by mouth 2 (two) times a day (Patient not taking: Reported on 1/8/2021), Disp: 7 capsule, Rfl: 0    OLANZapine-FLUoxetine (SYMBYAX) 3-25 MG per capsule, Take 1 capsule by mouth every evening, Disp: , Rfl:     polyethylene glycol (MIRALAX) 17 g packet, Take 17 g by mouth daily, Disp: , Rfl:     celecoxib (CeleBREX) 200 mg capsule, Take 1 capsule (200 mg total) by mouth 2 (two) times a day (Patient taking differently: Take 200 mg by mouth as needed ), Disp: 180 capsule, Rfl: 1    dexamethasone (DECADRON) 1 mg tablet, Take 1 tablet (1 mg total) by mouth daily with breakfast, Disp: 5 tablet, Rfl: 0    hydroxychloroquine (PLAQUENIL) 200 mg tablet, Take 1 tablet (200 mg total) by mouth daily, Disp: 90 tablet, Rfl: 1    levonorgestrel (KYLEENA) 19 5 MG intrauterine device, 1 Intra Uterine Device by Intrauterine route once, Disp: , Rfl:     nitrofurantoin (MACRODANTIN) 50 mg capsule, Take 1 capsule (50 mg total) by mouth daily at bedtime as needed (after intercourse) (Patient not taking: Reported on 1/8/2021), Disp: 14 capsule, Rfl: 1    predniSONE 2 5 mg tablet, Combine to take 12 5 mg once daily  (Patient not taking: Reported on 1/8/2021), Disp: 30 tablet, Rfl: 1    predniSONE 5 mg tablet, Take the taper as directed  (Patient not taking: Reported on 1/8/2021), Disp: 60 tablet, Rfl: 1    prochlorperazine (COMPAZINE) 10 mg tablet, Take 1 tablet (10 mg total) by mouth every 6 (six) hours as needed (migraine), Disp: 10 tablet, Rfl: 0    Rimegepant Sulfate (Nurtec) 75 MG TBDP, Take 75 mg by mouth as needed (migraine), Disp: 8 tablet, Rfl: 3    SUMAtriptan (IMITREX) 100 mg tablet, TAKE ONE TABLET BY MOUTH ONCE AS NEEDED FOR MIGRAINE FOR UP TO 1 DOSE  MAX 2 TABS/24 HOURS  MAX 9 TABS/MONTH , Disp: 27 tablet, Rfl: 0    topiramate (TOPAMAX) 50 MG tablet, 1 tab in the am and 2 tabs at bedtime, Disp: 270 tablet, Rfl: 3  No current facility-administered medications for this visit       Past Medical History  Past Medical History:   Diagnosis Date    Anxiety     Depression     Encounter for IUD removal and reinsertion 5/29/2019    Migraines        Past Surgical History   Past Surgical History:   Procedure Laterality Date    INGUINAL HERNIA REPAIR  10/1998    REDUCTION MAMMAPLASTY  03/2013    TONSILLECTOMY  06/2011    TONSILLECTOMY      WISDOM TOOTH EXTRACTION         Family History    Family History   Problem Relation Age of Onset    Colon cancer Mother     Arthritis Mother     Thyroid disease Mother  Depression Mother     Anxiety disorder Mother     Clotting disorder Mother     Hyperlipidemia Mother     Vesicoureteral reflux Mother     Irritable bowel syndrome Mother     Migraines Mother     Skin cancer Mother     Thyroid disease unspecified Mother     Heart disease Father     Hypertension Father     Arthritis Father     Hyperlipidemia Father     Vesicoureteral reflux Father     Hypertension Brother     Hyperlipidemia Brother     COPD Maternal Grandmother     Stroke Maternal Grandmother     Lung cancer Maternal Grandmother     Arthritis Maternal Grandmother     Asthma Maternal Grandmother     Hyperlipidemia Maternal Grandmother     Vesicoureteral reflux Maternal Grandmother     Heart disease Maternal Grandmother     Hypertension Maternal Grandmother     Migraines Maternal Grandmother     Osteoporosis Maternal Grandmother     Heart attack Maternal Grandfather     Heart disease Maternal Grandfather     Diabetes Maternal Grandfather     Heart disease Paternal Grandmother     Hyperlipidemia Paternal Grandmother     Diabetes Paternal Grandfather     Kidney disease Maternal Aunt     Migraines Maternal Aunt     Heart attack Maternal Uncle     Heart disease Maternal Uncle     Melanoma Maternal Uncle     Lupus Cousin    Cousin, two maternal aunts - fibromyalgia  Mother, maternal grandmother, paternal grandmother - osteoporosis  Parents - osteoarthritis  Maternal grandmother - osteoarthritis, COPD, lung cancer, stroke      Social History  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[employer], 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  Social History     Substance and Sexual Activity   Alcohol Use Yes    Comment: social     Social History     Substance and Sexual Activity   Drug Use Never     Social History     Tobacco Use   Smoking Status Never Smoker   Smokeless Tobacco Never Used       Objective:  Vitals:    08/28/20 1354   BP: 120/78   BP Location: Left arm   Patient Position: Sitting   Cuff Size: Standard   Temp: 97 7 °F (36 5 °C)   TempSrc: Temporal   Weight: 57 8 kg (127 lb 6 4 oz)   Height: 5' 1" (1 549 m)       Physical Exam  Constitutional:       General: She is not in acute distress  Appearance: She is well-developed  HENT:      Head: Normocephalic and atraumatic  Eyes:      General: Lids are normal  No scleral icterus  Conjunctiva/sclera: Conjunctivae normal    Neck:      Musculoskeletal: Neck supple  No muscular tenderness  Thyroid: No thyromegaly  Cardiovascular:      Rate and Rhythm: Normal rate and regular rhythm  Heart sounds: S1 normal and S2 normal  No murmur  No friction rub  Pulmonary:      Effort: Pulmonary effort is normal  No tachypnea or respiratory distress  Breath sounds: Normal breath sounds  No wheezing, rhonchi or rales  Musculoskeletal:         General: Tenderness present  Comments: Bilateral shoulder tenderness, R ankle tenderness, L wrist and 1st MCP tenderness, R 5th MCP/PIP tenderness, cervical spine tenderness, and bilateral SI joint tenderness   Lymphadenopathy:      Head:      Right side of head: No submental or submandibular adenopathy  Left side of head: No submental or submandibular adenopathy  Cervical: No cervical adenopathy  Skin:     General: Skin is warm and dry  Findings: Rash present  Nails: There is no clubbing  Comments: Small erythematous papules on scalp   Neurological:      Mental Status: She is alert  Sensory: No sensory deficit  Psychiatric:         Behavior: Behavior normal  Behavior is cooperative  Reviewed labs and imaging      Imaging:   Bilateral Hand and SI joint x-rays 8/28/20: unremarkable    Labs:   Appointment on 08/28/2020   Component Date Value Ref Range Status    DEBBY 08/28/2020 Negative  Negative Final   Office Visit on 08/28/2020   Component Date Value Ref Range Status    Sed Rate 08/28/2020 5  0 - 19 mm/hour Final    CRP 08/28/2020 <3 0  <3 0 mg/L Final    Anti-Centromere B Antibodies 08/28/2020 <0 2  0 0 - 0 9 AI Final    Cyclic Citrullin Peptide Ab 08/28/2020 7  0 - 19 units Final                              Negative               <20                            Weak positive      20 - 39                            Moderate positive  40 - 59                            Strong positive        >59    HLA B27 08/28/2020 Negative   Final    HLA-B*27 Negative  B27 allele interpretation for all loci based on IMGT/HLA  database version 3 38  This test was developed and its performance characteristics  determined by [facility]   It has not been cleared or approved  by the Food and Drug Administration  [facility] CLIA ID Number [id number]  This test was performed using PCR (Polymerase Chain Reaction)/SSOP  (Sequence Specific Oligonucleotide Probes) technique  SBT (Sequence  Based Typing) and/or SSP (Sequence Specific Primers) may be used as  supplemental methods when necessary  Please contact [facility] Customer  Service at [phone] if you have any questions  Director of [facility] Laboratory   Dr Ashlee French, PhD    Ferritin 08/28/2020 40  8 - 388 ng/mL Final    ds DNA Ab 08/28/2020 1  0 - 9 IU/mL Final                                       Negative      <5                                     Equivocal  5 - 9                                     Positive      >9   Orders Only on 07/14/2020   Component Date Value Ref Range Status    SARS-CoV-2  07/14/2020 Not Detected  Not Detected Final    This test was developed and its performance characteristics determined  by [facility]  This test has not been FDA cleared or  approved  This test has been authorized by FDA under an Emergency Use  Authorization (EUA)   This test is only authorized for the duration of  time the declaration that circumstances exist justifying the  authorization of the emergency use of in vitro diagnostic tests for  detection of SARS-CoV-2 virus and/or diagnosis of COVID-19 infection  under section 564(b)(1) of the Act, 21 U  S C  595SYI-2(I)(5), unless  the authorization is terminated or revoked sooner  When diagnostic testing is negative, the possibility of a false  negative result should be considered in the context of a patient's  recent exposures and the presence of clinical signs and symptoms  consistent with COVID-19  An individual without symptoms of COVID-19  and who is not shedding SARS-CoV-2 virus would expect to have a  negative (not detected) result in this assay   Inpatient 07/14/2020 Comment   Final    Received   Appointment on 05/09/2020   Component Date Value Ref Range Status    Sed Rate 05/09/2020 4  0 - 20 mm/hour Final    CRP 05/09/2020 <3 0  <3 0 mg/L Final    Miscellaneous Lab Test Result 05/09/2020 SEE WRITTEN REPORT   Final    SS-A (RO) Ab 05/09/2020 <0 2  0 0 - 0 9 AI Final    SS-B (LA) Ab 05/09/2020 <0 2  0 0 - 0 9 AI Final    Uric Acid 05/09/2020 2 9  2 0 - 6 8 mg/dL Final    Specimen collection should occur prior to Metamizole administration due to the potential for falsely depressed results  Telemedicine on 05/08/2020   Component Date Value Ref Range Status    Sodium 05/09/2020 138  136 - 145 mmol/L Final    Potassium 05/09/2020 3 5  3 5 - 5 3 mmol/L Final    Chloride 05/09/2020 104  100 - 108 mmol/L Final    CO2 05/09/2020 25  21 - 32 mmol/L Final    ANION GAP 05/09/2020 9  4 - 13 mmol/L Final    BUN 05/09/2020 10  5 - 25 mg/dL Final    Creatinine 05/09/2020 0 99  0 60 - 1 30 mg/dL Final    Standardized to IDMS reference method    Glucose, Fasting 05/09/2020 92  65 - 99 mg/dL Final      Specimen collection should occur prior to Sulfasalazine administration due to the potential for falsely depressed results  Specimen collection should occur prior to Sulfapyridine administration due to the potential for falsely elevated results      Calcium 05/09/2020 8 6  8 3 - 10 1 mg/dL Final    AST 05/09/2020 13  5 - 45 U/L Final      Specimen collection should occur prior to Sulfasalazine administration due to the potential for falsely depressed results   ALT 05/09/2020 31  12 - 78 U/L Final      Specimen collection should occur prior to Sulfasalazine administration due to the potential for falsely depressed results   Alkaline Phosphatase 05/09/2020 52  46 - 116 U/L Final    Total Protein 05/09/2020 7 2  6 4 - 8 2 g/dL Final    Albumin 05/09/2020 3 8  3 5 - 5 0 g/dL Final    Total Bilirubin 05/09/2020 0 23  0 20 - 1 00 mg/dL Final      Use of this assay is not recommended for patients undergoing treatment with eltrombopag due to the potential for falsely elevated results   eGFR 05/09/2020 77  ml/min/1 73sq m Final    Hemoglobin A1C 05/09/2020 5 0  Normal 3 8-5 6%; PreDiabetic 5 7-6 4%;  Diabetic >=6 5%; Glycemic control for adults with diabetes <7 0% % Final    EAG 05/09/2020 97  mg/dl Final    WBC 05/09/2020 4 60  4 31 - 10 16 Thousand/uL Final    RBC 05/09/2020 4 32  3 81 - 5 12 Million/uL Final    Hemoglobin 05/09/2020 13 4  11 5 - 15 4 g/dL Final    Hematocrit 05/09/2020 39 4  34 8 - 46 1 % Final    MCV 05/09/2020 91  82 - 98 fL Final    MCH 05/09/2020 31 0  26 8 - 34 3 pg Final    MCHC 05/09/2020 34 0  31 4 - 37 4 g/dL Final    RDW 05/09/2020 11 5* 11 6 - 15 1 % Final    MPV 05/09/2020 10 9  8 9 - 12 7 fL Final    Platelets 31/02/4379 233  149 - 390 Thousands/uL Final    nRBC 05/09/2020 0  /100 WBCs Final    Neutrophils Relative 05/09/2020 51  43 - 75 % Final    Immat GRANS % 05/09/2020 0  0 - 2 % Final    Lymphocytes Relative 05/09/2020 38  14 - 44 % Final    Monocytes Relative 05/09/2020 6  4 - 12 % Final    Eosinophils Relative 05/09/2020 4  0 - 6 % Final    Basophils Relative 05/09/2020 1  0 - 1 % Final    Neutrophils Absolute 05/09/2020 2 35  1 85 - 7 62 Thousands/µL Final    Immature Grans Absolute 05/09/2020 0 02  0 00 - 0 20 Thousand/uL Final    Lymphocytes Absolute 05/09/2020 1 76  0 60 - 4 47 Thousands/µL Final    Monocytes Absolute 05/09/2020 0 27  0 17 - 1 22 Thousand/µL Final    Eosinophils Absolute 05/09/2020 0 16  0 00 - 0 61 Thousand/µL Final    Basophils Absolute 05/09/2020 0 04  0 00 - 0 10 Thousands/µL Final    TSH 3RD GENERATON 05/09/2020 3 225  0 358 - 3 740 uIU/mL Final      The recommended reference ranges for TSH during pregnancy are as follows:   First trimester 0 1 to 2 5 uIU/mL   Second trimester  0 2 to 3 0 uIU/mL   Third trimester 0 3 to 3 0 uIU/m    Note: Normal ranges may not apply to patients who are transgender, non-binary, or whose legal sex, sex at birth, and gender identity differ  Using supplements with high doses of biotin 20 to more than 300 times greater than the adequate daily intake for adults of 30 mcg/day as established by the [place] [employer], can cause falsely depress results   Lyme IgG/IgM Ab 05/09/2020 <0 91  0 00 - 0 90 ISR Final                                    Negative         <0 91                                  Equivocal  0 91 - 1 09                                  Positive         >1 09    Cholesterol 05/09/2020 209* 50 - 200 mg/dL Final      Cholesterol:       Desirable         <200 mg/dl       Borderline         200-239 mg/dl       High              >239           Triglycerides 05/09/2020 53  <=150 mg/dL Final      Triglyceride:     Normal          <150 mg/dl     Borderline High 150-199 mg/dl     High            200-499 mg/dl        Very High       >499 mg/dl    Specimen collection should occur prior to N-Acetylcysteine or Metamizole administration due to the potential for falsely depressed results   HDL, Direct 05/09/2020 67  >=40 mg/dL Final      HDL Cholesterol:       Low     <41 mg/dL  Specimen collection should occur prior to Metamizole administration due to the potential for falsley depressed results      LDL Calculated 05/09/2020 131* 0 - 100 mg/dL Final      LDL Cholesterol:     Optimal           <100 mg/dl     Near Optimal      100-129 mg/dl     Above Optimal       Borderline High 130-159 mg/dl       High            160-189 mg/dl       Very High       >189 mg/dl         This screening LDL is a calculated result  It does not have the accuracy of the Direct Measured LDL in the monitoring of patients with hyperlipidemia and/or statin therapy  Direct Measure LDL ([id number]) must be ordered separately in these patients   Non-HDL-Chol (CHOL-HDL) 05/09/2020 142  mg/dl Final    DEBBY 05/09/2020 Negative  Negative Final    Rheumatoid Factor 05/09/2020 Negative  Negative Final    Vit D, 25-Hydroxy 05/09/2020 33 3  30 0 - 100 0 ng/mL Final    Iron Saturation 05/09/2020 39  % Final    TIBC 05/09/2020 272  250 - 450 ug/dL Final    Iron 05/09/2020 107  50 - 170 ug/dL Final      Patients treated with metal-binding drugs (ie  Deferoxamine) may have depressed iron values      Ferritin 05/09/2020 48  8 - 388 ng/mL Final   Office Visit on 04/22/2020   Component Date Value Ref Range Status    LEUKOCYTE ESTERASE,UA 04/22/2020 trace   Final    NITRITE,UA 04/22/2020 +   Final    SL AMB POCT UROBILINOGEN 04/22/2020 4 0   Final    POCT URINE PROTEIN 04/22/2020 100   Final     PH,UA 04/22/2020 6 0   Final    BLOOD,UA 04/22/2020 -   Final    SPECIFIC GRAVITY,UA 04/22/2020 1 020   Final    KETONES,UA 04/22/2020 -   Final    BILIRUBIN,UA 04/22/2020 small   Final    GLUCOSE, UA 04/22/2020 100   Final     COLOR,UA 04/22/2020 orange   Final    CLARITY,UA 04/22/2020 cloudy   Final    Urine Culture 04/22/2020 <10,000 cfu/ml Gram Negative Blake Enteric Like*  Final   Orders Only on 03/27/2020   Component Date Value Ref Range Status    Coronavirus 2019-nCoV 03/27/2020 Not Detected  Not Detected Final

## 2020-08-28 NOTE — LETTER
January 11, 2021     Shane Chavez [address] 1983 [address] Go Vocab    Patient: Berta Gregory   YOB: 1990   Date of Visit: 8/28/2020       Dear Dr Yary Jiménez: Thank you for referring Governor Aguilar to me for evaluation  Below are my notes for this consultation  If you have questions, please do not hesitate to call me  I look forward to following your patient along with you  Sincerely,        Mago Bernstein MD        CC: No Recipients  Mago Bernstein MD  1/9/2021 11:03 AM  Signed  Assessment and Plan: Berta Gregory is a 27 y o   female who presents as a Rheumatology consult referred by her PCP Mireya Lindo PA-C for evaluation of possible autoimmune disease  Patient admits to fevers, photosensitivity, arthralgia, myalgia, fatigue, mouth/nasal sores, sicca symptoms, Raynaud's symptoms, headaches, and malar erythema  Extensive autoimmune disease lab and x-ray work-up ordered below and returned unremarkable  Prescribed celecoxib 100mg po bid prn joint pain  Can also prescribe prednisone course to see if symptoms improve; if they do, then there likely is an inflammatory process going on  Plan:  Diagnoses and all orders for this visit:    Arthralgia, unspecified joint  -     MISCELLANEOUS LAB TEST  -     Sedimentation rate, automated  -     C-reactive protein  -     Centromere Antibody  -     Cyclic citrul peptide antibody, IgG  -     HLA-B27 antigen  -     XR sacroiliac joints < 3 views; Future  -     XR hand 3+ vw left; Future  -     XR hand 3+ vw right; Future  -     Ferritin  -     celecoxib (CeleBREX) 100 mg capsule;  Take 1 capsule (100 mg total) by mouth 2 (two) times a day  -     Anti-DNA antibody, double-stranded    Raynaud's disease without gangrene    Dry eye syndrome, bilateral    Other fatigue    Follow-up plan: Return to clinic in 2 months      HPI  Berta Gregory is a 27 y o   female who presents as a Rheumatology consult referred by her PCP Mireya Lindo PA-C for evaluation of possible autoimmune disease  Patient first saw 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6); 8 months ago, started getting low-grade fevers (100 3F), with associated joint pain, muscle pain, and fatigue; has had mouth sores whole life, lately getting nose sores frequently as well; dry eyes, dry mouth  Hands have been hurting, back pain constant for several years  Was on gabapentin in the past through Neurology 600mg tid, didn't help, made mood worse  Has tried Celebrex, which helped joint pain, Scelaxin, and Flexeril - takes at night sometimes; gets Botox injections for chronic migraines  Was tried on Cymbalta for two weeks, was not on it long enough to notice if it helped  Currently, neck, shoulders, low back, and hands bother most; gets leg cramping, worse at night  Has been taking Advil 600-800mg 1-2 times a day, has tried naproxen 500mg as well; Advil helped more; has tried meloxicam, but does not remember if it helped  Admits to photosensitivity with blistering rash, has scalp sores and flaking, face and ears get dry and flaky, gets malar eyrthema, sicca symptoms, hair thinning, Raynaud's symptoms of fingers and toes, gets occasional stabbing in back and chest on deep breathing, gets heartburn, no blood clots or miscarriages, easy bruising; feet and ankles get swollen, sometimes wrist and fingers  Has headaches/chronic migraine  Review of Systems  Review of Systems   Constitutional: Positive for fatigue and fever  Negative for chills and unexpected weight change  HENT: Positive for mouth sores  Negative for trouble swallowing           Dry mouth, nose sores Eyes: Positive for pain  Negative for visual disturbance  Eye pain   Respiratory: Positive for chest tightness  Negative for cough and shortness of breath  Chest pain with deep breathing   Cardiovascular: Negative for chest pain and leg swelling  Gastrointestinal: Positive for constipation  Negative for abdominal pain, blood in stool, diarrhea and nausea  Genitourinary: Positive for frequency  Urine retention   Musculoskeletal: Positive for arthralgias, back pain, joint swelling, myalgias and neck pain  Skin: Positive for color change and rash  Neurological: Positive for dizziness, numbness and headaches  Negative for weakness  Hematological: Negative for adenopathy  Bruises/bleeds easily  Psychiatric/Behavioral: Negative for sleep disturbance         Allergies  Allergies   Allergen Reactions    Oxcarbazepine Hives     Hives       Home Medications    Current Outpatient Medications:     amphetamine-dextroamphetamine (ADDERALL) 20 mg tablet, Take 20 mg by mouth daily , Disp: , Rfl: 0    buPROPion (WELLBUTRIN SR) 150 mg 12 hr tablet, Take 150 mg by mouth 2 (two) times a day , Disp: , Rfl:     cyclobenzaprine (FLEXERIL) 5 mg tablet, Take 2 tablets (10 mg total) by mouth daily at bedtime (Patient taking differently: Take 10 mg by mouth as needed ), Disp: 180 tablet, Rfl: 3    diazepam (VALIUM) 5 mg tablet, Take 5 mg by mouth every 6 (six) hours as needed , Disp: , Rfl:     fexofenadine (ALLEGRA) 180 MG tablet, Take 180 mg by mouth daily, Disp: , Rfl:     FLUoxetine (PROzac) 10 mg capsule, Take 10 mg by mouth daily , Disp: , Rfl:     Ibuprofen 200 MG CAPS, Take 800 mg by mouth every 6 (six) hours as needed, Disp: , Rfl:     lamoTRIgine (LaMICtal) 100 mg tablet, Take 100 mg by mouth daily , Disp: , Rfl:     levothyroxine 100 mcg tablet, Take 1 tablet (100 mcg total) by mouth daily, Disp: 30 tablet, Rfl: 11    nitrofurantoin (MACROBID) 100 mg capsule, Take 1 capsule (100 mg total) by mouth 2 (two) times a day (Patient not taking: Reported on 1/8/2021), Disp: 7 capsule, Rfl: 0    OLANZapine-FLUoxetine (SYMBYAX) 3-25 MG per capsule, Take 1 capsule by mouth every evening, Disp: , Rfl:     polyethylene glycol (MIRALAX) 17 g packet, Take 17 g by mouth daily, Disp: , Rfl:     celecoxib (CeleBREX) 200 mg capsule, Take 1 capsule (200 mg total) by mouth 2 (two) times a day (Patient taking differently: Take 200 mg by mouth as needed ), Disp: 180 capsule, Rfl: 1    dexamethasone (DECADRON) 1 mg tablet, Take 1 tablet (1 mg total) by mouth daily with breakfast, Disp: 5 tablet, Rfl: 0    hydroxychloroquine (PLAQUENIL) 200 mg tablet, Take 1 tablet (200 mg total) by mouth daily, Disp: 90 tablet, Rfl: 1    levonorgestrel (KYLEENA) 19 5 MG intrauterine device, 1 Intra Uterine Device by Intrauterine route once, Disp: , Rfl:     nitrofurantoin (MACRODANTIN) 50 mg capsule, Take 1 capsule (50 mg total) by mouth daily at bedtime as needed (after intercourse) (Patient not taking: Reported on 1/8/2021), Disp: 14 capsule, Rfl: 1    predniSONE 2 5 mg tablet, Combine to take 12 5 mg once daily  (Patient not taking: Reported on 1/8/2021), Disp: 30 tablet, Rfl: 1    predniSONE 5 mg tablet, Take the taper as directed  (Patient not taking: Reported on 1/8/2021), Disp: 60 tablet, Rfl: 1    prochlorperazine (COMPAZINE) 10 mg tablet, Take 1 tablet (10 mg total) by mouth every 6 (six) hours as needed (migraine), Disp: 10 tablet, Rfl: 0    Rimegepant Sulfate (Nurtec) 75 MG TBDP, Take 75 mg by mouth as needed (migraine), Disp: 8 tablet, Rfl: 3    SUMAtriptan (IMITREX) 100 mg tablet, TAKE ONE TABLET BY MOUTH ONCE AS NEEDED FOR MIGRAINE FOR UP TO 1 DOSE  MAX 2 TABS/24 HOURS  MAX 9 TABS/MONTH , Disp: 27 tablet, Rfl: 0    topiramate (TOPAMAX) 50 MG tablet, 1 tab in the am and 2 tabs at bedtime, Disp: 270 tablet, Rfl: 3  No current facility-administered medications for this visit       Past Medical History  Past Medical History:   Diagnosis Date    Anxiety     Depression     Encounter for IUD removal and reinsertion 5/29/2019    Migraines        Past Surgical History   Past Surgical History:   Procedure Laterality Date    INGUINAL HERNIA REPAIR  10/1998    REDUCTION MAMMAPLASTY  03/2013    TONSILLECTOMY  06/2011    TONSILLECTOMY      WISDOM TOOTH EXTRACTION         Family History    Family History   Problem Relation Age of Onset    Colon cancer Mother     Arthritis Mother     Thyroid disease Mother     Depression Mother     Anxiety disorder Mother     Clotting disorder Mother     Hyperlipidemia Mother     Vesicoureteral reflux Mother     Irritable bowel syndrome Mother     Migraines Mother     Skin cancer Mother     Thyroid disease unspecified Mother     Heart disease Father     Hypertension Father     Arthritis Father     Hyperlipidemia Father     Vesicoureteral reflux Father     Hypertension Brother     Hyperlipidemia Brother     COPD Maternal Grandmother     Stroke Maternal Grandmother     Lung cancer Maternal Grandmother     Arthritis Maternal Grandmother     Asthma Maternal Grandmother     Hyperlipidemia Maternal Grandmother     Vesicoureteral reflux Maternal Grandmother     Heart disease Maternal Grandmother     Hypertension Maternal Grandmother     Migraines Maternal Grandmother     Osteoporosis Maternal Grandmother     Heart attack Maternal Grandfather     Heart disease Maternal Grandfather     Diabetes Maternal Grandfather     Heart disease Paternal Grandmother     Hyperlipidemia Paternal Grandmother     Diabetes Paternal Grandfather     Kidney disease Maternal Aunt     Migraines Maternal Aunt     Heart attack Maternal Uncle     Heart disease Maternal Uncle     Melanoma Maternal Uncle     Lupus Cousin    Cousin, two maternal aunts - fibromyalgia  Mother, maternal grandmother, paternal grandmother - osteoporosis  Parents - osteoarthritis  Maternal grandmother - osteoarthritis, COPD, lung cancer, stroke      Social History  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[employer], 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  Social History     Substance and Sexual Activity   Alcohol Use Yes    Comment: social     Social History     Substance and Sexual Activity   Drug Use Never     Social History     Tobacco Use   Smoking Status Never Smoker   Smokeless Tobacco Never Used       Objective:  Vitals:    08/28/20 1354   BP: 120/78   BP Location: Left arm   Patient Position: Sitting   Cuff Size: Standard   Temp: 97 7 °F (36 5 °C)   TempSrc: Temporal   Weight: 57 8 kg (127 lb 6 4 oz)   Height: 5' 1" (1 549 m)       Physical Exam  Constitutional:       General: She is not in acute distress  Appearance: She is well-developed  HENT:      Head: Normocephalic and atraumatic  Eyes:      General: Lids are normal  No scleral icterus  Conjunctiva/sclera: Conjunctivae normal    Neck:      Musculoskeletal: Neck supple  No muscular tenderness  Thyroid: No thyromegaly  Cardiovascular:      Rate and Rhythm: Normal rate and regular rhythm  Heart sounds: S1 normal and S2 normal  No murmur  No friction rub  Pulmonary:      Effort: Pulmonary effort is normal  No tachypnea or respiratory distress  Breath sounds: Normal breath sounds  No wheezing, rhonchi or rales  Musculoskeletal:         General: Tenderness present  Comments: Bilateral shoulder tenderness, R ankle tenderness, L wrist and 1st MCP tenderness, R 5th MCP/PIP tenderness, cervical spine tenderness, and bilateral SI joint tenderness   Lymphadenopathy:      Head:      Right side of head: No submental or submandibular adenopathy  Left side of head: No submental or submandibular adenopathy  Cervical: No cervical adenopathy  Skin:     General: Skin is warm and dry  Findings: Rash present  Nails: There is no clubbing          Comments: Small erythematous papules on scalp   Neurological: Mental Status: She is alert  Sensory: No sensory deficit  Psychiatric:         Behavior: Behavior normal  Behavior is cooperative  Reviewed labs and imaging  Imaging:   Bilateral Hand and SI joint x-rays 8/28/20: unremarkable    Labs:   Appointment on 08/28/2020   Component Date Value Ref Range Status    DEBBY 08/28/2020 Negative  Negative Final   Office Visit on 08/28/2020   Component Date Value Ref Range Status    Sed Rate 08/28/2020 5  0 - 19 mm/hour Final    CRP 08/28/2020 <3 0  <3 0 mg/L Final    Anti-Centromere B Antibodies 08/28/2020 <0 2  0 0 - 0 9 AI Final    Cyclic Citrullin Peptide Ab 08/28/2020 7  0 - 19 units Final                              Negative               <20                            Weak positive      20 - 39                            Moderate positive  40 - 59                            Strong positive        >59    HLA B27 08/28/2020 Negative   Final    HLA-B*27 Negative  B27 allele interpretation for all loci based on IMGT/HLA  database version 3 38  This test was developed and its performance characteristics  determined by [facility]   It has not been cleared or approved  by the Food and Drug Administration  [facility] CLIA ID Number [id number]  This test was performed using PCR (Polymerase Chain Reaction)/SSOP  (Sequence Specific Oligonucleotide Probes) technique  SBT (Sequence  Based Typing) and/or SSP (Sequence Specific Primers) may be used as  supplemental methods when necessary  Please contact [facility] Customer  Service at [phone] if you have any questions     Director of [facility] Laboratory   Dr Lester Jung, PhD    Ferritin 08/28/2020 40  8 - 388 ng/mL Final    ds DNA Ab 08/28/2020 1  0 - 9 IU/mL Final                                       Negative      <5                                     Equivocal  5 - 9                                     Positive      >9   Orders Only on 07/14/2020   Component Date Value Ref Range Status    SARS-CoV-2  07/14/2020 Not Detected  Not Detected Final    This test was developed and its performance characteristics determined  by [facility]  This test has not been FDA cleared or  approved  This test has been authorized by FDA under an Emergency Use  Authorization (EUA)  This test is only authorized for the duration of  time the declaration that circumstances exist justifying the  authorization of the emergency use of in vitro diagnostic tests for  detection of SARS-CoV-2 virus and/or diagnosis of COVID-19 infection  under section 564(b)(1) of the Act, 21 U  S C  125QQW-4(M)(2), unless  the authorization is terminated or revoked sooner  When diagnostic testing is negative, the possibility of a false  negative result should be considered in the context of a patient's  recent exposures and the presence of clinical signs and symptoms  consistent with COVID-19  An individual without symptoms of COVID-19  and who is not shedding SARS-CoV-2 virus would expect to have a  negative (not detected) result in this assay   Inpatient 07/14/2020 Comment   Final    Received   Appointment on 05/09/2020   Component Date Value Ref Range Status    Sed Rate 05/09/2020 4  0 - 20 mm/hour Final    CRP 05/09/2020 <3 0  <3 0 mg/L Final    Miscellaneous Lab Test Result 05/09/2020 SEE WRITTEN REPORT   Final    SS-A (RO) Ab 05/09/2020 <0 2  0 0 - 0 9 AI Final    SS-B (LA) Ab 05/09/2020 <0 2  0 0 - 0 9 AI Final    Uric Acid 05/09/2020 2 9  2 0 - 6 8 mg/dL Final    Specimen collection should occur prior to Metamizole administration due to the potential for falsely depressed results     Telemedicine on 05/08/2020   Component Date Value Ref Range Status    Sodium 05/09/2020 138  136 - 145 mmol/L Final    Potassium 05/09/2020 3 5  3 5 - 5 3 mmol/L Final    Chloride 05/09/2020 104  100 - 108 mmol/L Final    CO2 05/09/2020 25  21 - 32 mmol/L Final    ANION GAP 05/09/2020 9  4 - 13 mmol/L Final    BUN 05/09/2020 10  5 - 25 mg/dL Final    Creatinine 05/09/2020 0 99  0 60 - 1 30 mg/dL Final    Standardized to IDMS reference method    Glucose, Fasting 05/09/2020 92  65 - 99 mg/dL Final      Specimen collection should occur prior to Sulfasalazine administration due to the potential for falsely depressed results  Specimen collection should occur prior to Sulfapyridine administration due to the potential for falsely elevated results   Calcium 05/09/2020 8 6  8 3 - 10 1 mg/dL Final    AST 05/09/2020 13  5 - 45 U/L Final      Specimen collection should occur prior to Sulfasalazine administration due to the potential for falsely depressed results   ALT 05/09/2020 31  12 - 78 U/L Final      Specimen collection should occur prior to Sulfasalazine administration due to the potential for falsely depressed results   Alkaline Phosphatase 05/09/2020 52  46 - 116 U/L Final    Total Protein 05/09/2020 7 2  6 4 - 8 2 g/dL Final    Albumin 05/09/2020 3 8  3 5 - 5 0 g/dL Final    Total Bilirubin 05/09/2020 0 23  0 20 - 1 00 mg/dL Final      Use of this assay is not recommended for patients undergoing treatment with eltrombopag due to the potential for falsely elevated results   eGFR 05/09/2020 77  ml/min/1 73sq m Final    Hemoglobin A1C 05/09/2020 5 0  Normal 3 8-5 6%; PreDiabetic 5 7-6 4%;  Diabetic >=6 5%; Glycemic control for adults with diabetes <7 0% % Final    EAG 05/09/2020 97  mg/dl Final    WBC 05/09/2020 4 60  4 31 - 10 16 Thousand/uL Final    RBC 05/09/2020 4 32  3 81 - 5 12 Million/uL Final    Hemoglobin 05/09/2020 13 4  11 5 - 15 4 g/dL Final    Hematocrit 05/09/2020 39 4  34 8 - 46 1 % Final    MCV 05/09/2020 91  82 - 98 fL Final    MCH 05/09/2020 31 0  26 8 - 34 3 pg Final    MCHC 05/09/2020 34 0  31 4 - 37 4 g/dL Final    RDW 05/09/2020 11 5* 11 6 - 15 1 % Final    MPV 05/09/2020 10 9  8 9 - 12 7 fL Final    Platelets 85/84/9149 233  149 - 390 Thousands/uL Final    nRBC 05/09/2020 0  /100 WBCs Final    Neutrophils Relative 05/09/2020 51  43 - 75 % Final    Immat GRANS % 05/09/2020 0  0 - 2 % Final    Lymphocytes Relative 05/09/2020 38  14 - 44 % Final    Monocytes Relative 05/09/2020 6  4 - 12 % Final    Eosinophils Relative 05/09/2020 4  0 - 6 % Final    Basophils Relative 05/09/2020 1  0 - 1 % Final    Neutrophils Absolute 05/09/2020 2 35  1 85 - 7 62 Thousands/µL Final    Immature Grans Absolute 05/09/2020 0 02  0 00 - 0 20 Thousand/uL Final    Lymphocytes Absolute 05/09/2020 1 76  0 60 - 4 47 Thousands/µL Final    Monocytes Absolute 05/09/2020 0 27  0 17 - 1 22 Thousand/µL Final    Eosinophils Absolute 05/09/2020 0 16  0 00 - 0 61 Thousand/µL Final    Basophils Absolute 05/09/2020 0 04  0 00 - 0 10 Thousands/µL Final    TSH 3RD GENERATON 05/09/2020 3 225  0 358 - 3 740 uIU/mL Final      The recommended reference ranges for TSH during pregnancy are as follows:   First trimester 0 1 to 2 5 uIU/mL   Second trimester  0 2 to 3 0 uIU/mL   Third trimester 0 3 to 3 0 uIU/m    Note: Normal ranges may not apply to patients who are transgender, non-binary, or whose legal sex, sex at birth, and gender identity differ  Using supplements with high doses of biotin 20 to more than 300 times greater than the adequate daily intake for adults of 30 mcg/day as established by the [employer], can cause falsely depress results      Lyme IgG/IgM Ab 05/09/2020 <0 91  0 00 - 0 90 ISR Final                                    Negative         <0 91                                  Equivocal  0 91 - 1 09                                  Positive         >1 09    Cholesterol 05/09/2020 209* 50 - 200 mg/dL Final      Cholesterol:       Desirable         <200 mg/dl       Borderline         200-239 mg/dl       High              >239           Triglycerides 05/09/2020 53  <=150 mg/dL Final      Triglyceride:     Normal          <150 mg/dl     Borderline High 150-199 mg/dl     High            200-499 mg/dl        Very High       >499 mg/dl    Specimen collection should occur prior to N-Acetylcysteine or Metamizole administration due to the potential for falsely depressed results   HDL, Direct 05/09/2020 67  >=40 mg/dL Final      HDL Cholesterol:       Low     <41 mg/dL  Specimen collection should occur prior to Metamizole administration due to the potential for falsley depressed results   LDL Calculated 05/09/2020 131* 0 - 100 mg/dL Final      LDL Cholesterol:     Optimal           <100 mg/dl     Near Optimal      100-129 mg/dl     Above Optimal       Borderline High 130-159 mg/dl       High            160-189 mg/dl       Very High       >189 mg/dl         This screening LDL is a calculated result  It does not have the accuracy of the Direct Measured LDL in the monitoring of patients with hyperlipidemia and/or statin therapy  Direct Measure LDL (UIW924) must be ordered separately in these patients   Non-HDL-Chol (CHOL-HDL) 05/09/2020 142  mg/dl Final    DEBBY 05/09/2020 Negative  Negative Final    Rheumatoid Factor 05/09/2020 Negative  Negative Final    Vit D, 25-Hydroxy 05/09/2020 33 3  30 0 - 100 0 ng/mL Final    Iron Saturation 05/09/2020 39  % Final    TIBC 05/09/2020 272  250 - 450 ug/dL Final    Iron 05/09/2020 107  50 - 170 ug/dL Final      Patients treated with metal-binding drugs (ie  Deferoxamine) may have depressed iron values      Ferritin 05/09/2020 48  8 - 388 ng/mL Final   Office Visit on 04/22/2020   Component Date Value Ref Range Status    LEUKOCYTE ESTERASE,UA 04/22/2020 trace   Final    NITRITE,UA 04/22/2020 +   Final    SL AMB POCT UROBILINOGEN 04/22/2020 4 0   Final    POCT URINE PROTEIN 04/22/2020 100   Final     PH,UA 04/22/2020 6 0   Final    BLOOD,UA 04/22/2020 -   Final    SPECIFIC GRAVITY,UA 04/22/2020 1 020   Final    KETONES,UA 04/22/2020 -   Final    BILIRUBIN,UA 04/22/2020 small   Final    GLUCOSE, UA 04/22/2020 100   Final     COLOR,UA 04/22/2020 orange   Final    CLARITY,UA 04/22/2020 cloudy   Final    Urine Culture 04/22/2020 <10,000 cfu/ml Gram Negative Blake Enteric Like*  Final   Orders Only on 03/27/2020   Component Date Value Ref Range Status    Coronavirus 2019-nCoV 03/27/2020 Not Detected  Not Detected Final

## 2020-08-29 LAB
CENTROMERE B AB SER-ACNC: <0.2 AI (ref 0–0.9)
DSDNA AB SER-ACNC: 1 IU/ML (ref 0–9)

## 2020-08-30 LAB — CCP IGA+IGG SERPL IA-ACNC: 7 UNITS (ref 0–19)

## 2020-08-31 LAB — RYE IGE QN: NEGATIVE

## 2020-09-02 ENCOUNTER — TELEPHONE (OUTPATIENT)
Dept: OBGYN CLINIC | Facility: HOSPITAL | Age: 30
End: 2020-09-02

## 2020-09-02 LAB — HLA-B27 QL NAA+PROBE: NEGATIVE

## 2020-09-02 NOTE — TELEPHONE ENCOUNTER
Patient sees Dr Keny Erickson  Patient is calling to see if the HLAb 27 was done because she didn't see the results in per portal  I advised it was still in progress

## 2020-09-04 DIAGNOSIS — G43.709 CHRONIC MIGRAINE WITHOUT AURA WITHOUT STATUS MIGRAINOSUS, NOT INTRACTABLE: ICD-10-CM

## 2020-09-04 RX ORDER — CELECOXIB 100 MG/1
100 CAPSULE ORAL 2 TIMES DAILY
Qty: 60 CAPSULE | Refills: 3 | Status: SHIPPED | OUTPATIENT
Start: 2020-09-04 | End: 2020-09-04

## 2020-09-04 RX ORDER — CELECOXIB 200 MG/1
200 CAPSULE ORAL 2 TIMES DAILY
Qty: 60 CAPSULE | Refills: 3 | Status: SHIPPED | OUTPATIENT
Start: 2020-09-04 | End: 2020-10-09 | Stop reason: SDUPTHER

## 2020-09-04 RX ORDER — SUMATRIPTAN 100 MG/1
TABLET, FILM COATED ORAL
Qty: 27 TABLET | Refills: 0 | Status: SHIPPED | OUTPATIENT
Start: 2020-09-04 | End: 2021-10-01 | Stop reason: SDUPTHER

## 2020-09-15 ENCOUNTER — TELEPHONE (OUTPATIENT)
Dept: OBGYN CLINIC | Facility: MEDICAL CENTER | Age: 30
End: 2020-09-15

## 2020-09-15 RX ORDER — PREDNISONE 1 MG/1
TABLET ORAL
Qty: 70 TABLET | Refills: 0 | Status: SHIPPED | OUTPATIENT
Start: 2020-09-15 | End: 2020-10-06 | Stop reason: SDUPTHER

## 2020-09-15 NOTE — TELEPHONE ENCOUNTER
Please let patient know that I sent to her pharmacy a prednisone course to start taking  If it helps, then it is more like that she has an underlying autoimmune/inflammatory condition      Thanks,  RAPHAEL

## 2020-09-15 NOTE — TELEPHONE ENCOUNTER
Patient sees Dr Kim Curran  Patient is calling stating her pain is becoming very aggressive in all her joints (7)  She increase her Celebrex, but it is not helping  Fever of 100 1 as of yesterday  She stated the discussion for Prednisone was discussed  She uses [address] [phone], per [facility] cell

## 2020-09-15 NOTE — TELEPHONE ENCOUNTER
I spoke with the patient, she has been notified  She wanted me to let you know she really does appreciate this and thanks you very much

## 2020-09-16 ENCOUNTER — TELEPHONE (OUTPATIENT)
Dept: NEUROLOGY | Facility: CLINIC | Age: 30
End: 2020-09-16

## 2020-09-16 ENCOUNTER — DOCUMENTATION (OUTPATIENT)
Dept: NEUROLOGY | Facility: CLINIC | Age: 30
End: 2020-09-16

## 2020-09-16 NOTE — TELEPHONE ENCOUNTER
Saba Clement,    Please make sure the patient has a Botox follow-up scheduled for Jan 2021  Will need updated clinical notes in order to obtain a new authorization for the new year  Patient was last seen 5/2019      Thank you    Yonis Guadalupe

## 2020-09-16 NOTE — PROGRESS NOTES
Type  Date  User  Summary  Attachment    General  09/16/2020 12:42 PM  [facility] coordination  -    Note     Botox- authorization #: [id number]- last visit- valid from 1/31/2020 until 1/30/2021   Please use our stock      Thank you,     Lc Borrero

## 2020-09-16 NOTE — PROGRESS NOTES
Patient is scheduled with Rufina Bueno on 11/6/2020 in the [facility] location

## 2020-09-18 NOTE — TELEPHONE ENCOUNTER
Called patient and advised Botox Saige Tong is due to  in 2021  Need to scheduled follow up to reauthorize Botox  Patient agreeable and needs Friday afternoons due to work  Scheduled follow up on Friday 1/15/2020 at 145 pm  Patient confirmed

## 2020-10-05 ENCOUNTER — TELEPHONE (OUTPATIENT)
Dept: OBGYN CLINIC | Facility: HOSPITAL | Age: 30
End: 2020-10-05

## 2020-10-06 ENCOUNTER — TELEPHONE (OUTPATIENT)
Dept: OBGYN CLINIC | Facility: HOSPITAL | Age: 30
End: 2020-10-06

## 2020-10-06 DIAGNOSIS — M25.50 ARTHRALGIA, UNSPECIFIED JOINT: ICD-10-CM

## 2020-10-06 RX ORDER — PREDNISONE 20 MG/1
20 TABLET ORAL DAILY
Qty: 7 TABLET | Refills: 0 | Status: SHIPPED | OUTPATIENT
Start: 2020-10-06 | End: 2020-10-09 | Stop reason: ALTCHOICE

## 2020-10-09 ENCOUNTER — APPOINTMENT (OUTPATIENT)
Dept: LAB | Facility: MEDICAL CENTER | Age: 30
End: 2020-10-09
Payer: COMMERCIAL

## 2020-10-09 ENCOUNTER — OFFICE VISIT (OUTPATIENT)
Dept: RHEUMATOLOGY | Facility: CLINIC | Age: 30
End: 2020-10-09
Payer: COMMERCIAL

## 2020-10-09 VITALS
BODY MASS INDEX: 23.6 KG/M2 | HEIGHT: 61 IN | TEMPERATURE: 98.5 F | SYSTOLIC BLOOD PRESSURE: 100 MMHG | DIASTOLIC BLOOD PRESSURE: 78 MMHG | WEIGHT: 125 LBS

## 2020-10-09 DIAGNOSIS — M35.9 UNDIFFERENTIATED CONNECTIVE TISSUE DISEASE (HCC): Primary | ICD-10-CM

## 2020-10-09 DIAGNOSIS — M25.40 PAINFUL SWELLING OF JOINT: ICD-10-CM

## 2020-10-09 DIAGNOSIS — I73.00 RAYNAUD'S DISEASE WITHOUT GANGRENE: ICD-10-CM

## 2020-10-09 DIAGNOSIS — M25.50 ARTHRALGIA, UNSPECIFIED JOINT: ICD-10-CM

## 2020-10-09 DIAGNOSIS — Z79.899 HIGH RISK MEDICATION USE: ICD-10-CM

## 2020-10-09 LAB — CK SERPL-CCNC: 129 U/L (ref 26–192)

## 2020-10-09 PROCEDURE — 82085 ASSAY OF ALDOLASE: CPT | Performed by: INTERNAL MEDICINE

## 2020-10-09 PROCEDURE — 36415 COLL VENOUS BLD VENIPUNCTURE: CPT | Performed by: INTERNAL MEDICINE

## 2020-10-09 PROCEDURE — 82550 ASSAY OF CK (CPK): CPT | Performed by: INTERNAL MEDICINE

## 2020-10-09 PROCEDURE — 82164 ANGIOTENSIN I ENZYME TEST: CPT | Performed by: INTERNAL MEDICINE

## 2020-10-09 PROCEDURE — 99215 OFFICE O/P EST HI 40 MIN: CPT | Performed by: INTERNAL MEDICINE

## 2020-10-09 RX ORDER — CELECOXIB 200 MG/1
200 CAPSULE ORAL 2 TIMES DAILY
Qty: 180 CAPSULE | Refills: 1 | Status: SHIPPED | OUTPATIENT
Start: 2020-10-09 | End: 2021-04-29 | Stop reason: ALTCHOICE

## 2020-10-09 RX ORDER — HYDROXYCHLOROQUINE SULFATE 200 MG/1
200 TABLET, FILM COATED ORAL DAILY
Qty: 90 TABLET | Refills: 1 | Status: SHIPPED | OUTPATIENT
Start: 2020-10-09 | End: 2021-03-12 | Stop reason: SDUPTHER

## 2020-10-09 RX ORDER — PREDNISONE 2.5 MG
TABLET ORAL
Qty: 196 TABLET | Refills: 0 | Status: SHIPPED | OUTPATIENT
Start: 2020-10-09 | End: 2020-11-16

## 2020-10-09 NOTE — PROGRESS NOTES
Assessment and Plan: Shelbie Caba is a 27 y o   female who presents for follow-up of possible autoimmune disease  With her history of photosensitivity, arthralgia, myalgia, fatigue, mouth/nasal sores, sicca symptoms, Raynaud's symptoms, headaches, and malar erythema, she may have undifferentiated connective tissue disease with a negative DEBBY  Also on differential is seronegative RA with sicca symptoms  Muscle enzymes and ACE level ordered to evaluate for myositis and sarcoidosis, which returned normal  Started patient on the DMARD hydroxychloroquine 200mg po daily; she was made aware to get regular eye exams while on this medication  She is to continue celecoxib 200mg po bid prn for joint pain  Put patient on a slower prednisone taper starting at 20mg po daily and to decrease by 2 5mg increments every week until off  Plan:  Diagnoses and all orders for this visit:    Undifferentiated connective tissue disease ([facility] [address] )  -     CK  -     Aldolase  -     hydroxychloroquine (PLAQUENIL) 200 mg tablet; Take 1 tablet (200 mg total) by mouth daily  -     Angiotensin converting enzyme  -     predniSONE 2 5 mg tablet; Take 7 tablets (17 5 mg total) by mouth daily for 7 days, THEN 6 tablets (15 mg total) daily for 7 days, THEN 5 tablets (12 5 mg total) daily for 7 days, THEN 4 tablets (10 mg total) daily for 7 days, THEN 3 tablets (7 5 mg total) daily for 7 days, THEN 2 tablets (5 mg total) daily for 7 days, THEN 1 tablet (2 5 mg total) daily for 7 days  Raynaud's disease without gangrene    Arthralgia, unspecified joint  -     celecoxib (CeleBREX) 200 mg capsule; Take 1 capsule (200 mg total) by mouth 2 (two) times a day     Painful swelling of joint    High risk medication use - Benefits and risks of hydroxychloroquine, including but not limited to retinal toxicity, corneal deposits, gastrointestinal side effects, and headaches were discussed with the patient   The need for a regular eye exam to monitor for ocular toxicity while on this medication was also explained to the patient  Follow-up plan: Return to clinic in 5 months      Rheumatic Disease Summary  Maria Isabel De Leon is a 27 y o   female who originally presented on 8/28/20 as a Rheumatology consult referred by her PCP Mireya Lindo PA-C for evaluation of possible autoimmune disease  Patient admitted to fevers, photosensitivity, arthralgia, myalgia, fatigue, mouth/nasal sores, sicca symptoms, Raynaud's symptoms, headaches, and malar erythema  Extensive autoimmune disease lab and x-ray work-up ordered and returned unremarkable  Prescribed celecoxib 100mg po bid prn joint pain  Can also prescribe prednisone course to see if symptoms improve; if they do, then there likely is an inflammatory process going on  HPI  Berta Gregory is a 27 y o   female who presents for follow-up of possible autoimmune disease  Last clinic visit was 8/28/20, which was her initial visit  Her symptoms significantly improved with prednisone course, but arthralgia returned off of it  Celecoxib 200mg po bid helps some  The following portions of the patient's history were reviewed and updated as appropriate: allergies, current medications, past family history, past medical history, past social history, past surgical history and problem list     Review of Systems:   Review of Systems   Constitutional: Positive for fatigue  Negative for chills, fever and unexpected weight change  HENT: Negative for mouth sores and trouble swallowing  Dry mouth   Eyes: Negative for pain and visual disturbance  Dry eyes   Respiratory: Negative for cough and shortness of breath  Cardiovascular: Negative for chest pain and leg swelling  Gastrointestinal: Negative for constipation and diarrhea  Musculoskeletal: Positive for arthralgias, back pain, joint swelling, myalgias and neck pain  Skin: Positive for color change  Negative for rash  Neurological: Positive for numbness and headaches  Negative for weakness  Hematological: Negative for adenopathy  Bruises/bleeds easily  Psychiatric/Behavioral: Negative for sleep disturbance  Home Medications:    Current Outpatient Medications:     amphetamine-dextroamphetamine (ADDERALL) 20 mg tablet, Take 20 mg by mouth daily , Disp: , Rfl: 0    buPROPion (WELLBUTRIN SR) 150 mg 12 hr tablet, Take 150 mg by mouth 2 (two) times a day , Disp: , Rfl:     celecoxib (CeleBREX) 200 mg capsule, Take 1 capsule (200 mg total) by mouth 2 (two) times a day (Patient taking differently: Take 200 mg by mouth as needed ), Disp: 180 capsule, Rfl: 1    cyclobenzaprine (FLEXERIL) 5 mg tablet, Take 2 tablets (10 mg total) by mouth daily at bedtime (Patient taking differently: Take 10 mg by mouth as needed ), Disp: 180 tablet, Rfl: 3    diazepam (VALIUM) 5 mg tablet, Take 5 mg by mouth every 6 (six) hours as needed , Disp: , Rfl:     fexofenadine (ALLEGRA) 180 MG tablet, Take 180 mg by mouth daily, Disp: , Rfl:     FLUoxetine (PROzac) 10 mg capsule, Take 10 mg by mouth daily , Disp: , Rfl:     Ibuprofen 200 MG CAPS, Take 800 mg by mouth every 6 (six) hours as needed, Disp: , Rfl:     lamoTRIgine (LaMICtal) 100 mg tablet, Take 100 mg by mouth daily , Disp: , Rfl:     levothyroxine 100 mcg tablet, Take 1 tablet (100 mcg total) by mouth daily, Disp: 30 tablet, Rfl: 11    nitrofurantoin (MACROBID) 100 mg capsule, Take 1 capsule (100 mg total) by mouth 2 (two) times a day (Patient not taking: Reported on 1/8/2021), Disp: 7 capsule, Rfl: 0    OLANZapine-FLUoxetine (SYMBYAX) 3-25 MG per capsule, Take 1 capsule by mouth every evening, Disp: , Rfl:     polyethylene glycol (MIRALAX) 17 g packet, Take 17 g by mouth daily, Disp: , Rfl:     SUMAtriptan (IMITREX) 100 mg tablet, TAKE ONE TABLET BY MOUTH ONCE AS NEEDED FOR MIGRAINE FOR UP TO 1 DOSE  MAX 2 TABS/24 HOURS   MAX 9 TABS/MONTH , Disp: 27 tablet, Rfl: 0    dexamethasone (DECADRON) 1 mg tablet, Take 1 tablet (1 mg total) by mouth daily with breakfast, Disp: 5 tablet, Rfl: 0    hydroxychloroquine (PLAQUENIL) 200 mg tablet, Take 1 tablet (200 mg total) by mouth daily, Disp: 90 tablet, Rfl: 1    levonorgestrel (KYLEENA) 19 5 MG intrauterine device, 1 Intra Uterine Device by Intrauterine route once, Disp: , Rfl:     nitrofurantoin (MACRODANTIN) 50 mg capsule, Take 1 capsule (50 mg total) by mouth daily at bedtime as needed (after intercourse) (Patient not taking: Reported on 1/8/2021), Disp: 14 capsule, Rfl: 1    predniSONE 2 5 mg tablet, Combine to take 12 5 mg once daily  (Patient not taking: Reported on 1/8/2021), Disp: 30 tablet, Rfl: 1    predniSONE 5 mg tablet, Take the taper as directed  (Patient not taking: Reported on 1/8/2021), Disp: 60 tablet, Rfl: 1    prochlorperazine (COMPAZINE) 10 mg tablet, Take 1 tablet (10 mg total) by mouth every 6 (six) hours as needed (migraine), Disp: 10 tablet, Rfl: 0    Rimegepant Sulfate (Nurtec) 75 MG TBDP, Take 75 mg by mouth as needed (migraine), Disp: 8 tablet, Rfl: 3    topiramate (TOPAMAX) 50 MG tablet, 1 tab in the am and 2 tabs at bedtime, Disp: 270 tablet, Rfl: 3    Objective:    Vitals:    10/09/20 1217   BP: 100/78   BP Location: Right arm   Patient Position: Sitting   Cuff Size: Standard   Temp: 98 5 °F (36 9 °C)   TempSrc: Temporal   Weight: 56 7 kg (125 lb)   Height: 5' 1" (1 549 m)       Physical Exam  Constitutional:       General: She is not in acute distress  Appearance: She is well-developed  HENT:      Head: Normocephalic and atraumatic  Eyes:      General: Lids are normal  No scleral icterus  Conjunctiva/sclera: Conjunctivae normal    Neck:      Musculoskeletal: Neck supple  No muscular tenderness  Cardiovascular:      Rate and Rhythm: Normal rate and regular rhythm  Heart sounds: S1 normal and S2 normal  No murmur  No friction rub  Pulmonary:      Effort: Pulmonary effort is normal  No tachypnea or respiratory distress        Breath sounds: Normal breath sounds  No wheezing, rhonchi or rales  Musculoskeletal:         General: Tenderness present  Comments: R wrist and diffuse MCP and PIP tenderness; L wrist tenderness; R ankle and MTP squeeze tenderness; bilateral proximal muscle tenderness   Skin:     General: Skin is warm and dry  Findings: No rash  Nails: There is no clubbing  Neurological:      Mental Status: She is alert  Sensory: No sensory deficit  Psychiatric:         Behavior: Behavior normal  Behavior is cooperative  Reviewed labs and imaging  Imaging:   Bilateral Hand and SI joint x-rays 8/28/20: unremarkable    Labs:   Office Visit on 10/09/2020   Component Date Value Ref Range Status    Total CK 10/09/2020 129  26 - 192 U/L Final    Aldolase 10/09/2020 4 2  3 3 - 10 3 U/L Final    Angio Convert Enzyme 10/09/2020 24  14 - 82 U/L Final   Appointment on 08/28/2020   Component Date Value Ref Range Status    DEBBY 08/28/2020 Negative  Negative Final   Office Visit on 08/28/2020   Component Date Value Ref Range Status    Sed Rate 08/28/2020 5  0 - 19 mm/hour Final    CRP 08/28/2020 <3 0  <3 0 mg/L Final    Anti-Centromere B Antibodies 08/28/2020 <0 2  0 0 - 0 9 AI Final    Cyclic Citrullin Peptide Ab 08/28/2020 7  0 - 19 units Final                              Negative               <20                            Weak positive      20 - 39                            Moderate positive  40 - 59                            Strong positive        >59    HLA B27 08/28/2020 Negative   Final    HLA-B*27 Negative  B27 allele interpretation for all loci based on IMGT/HLA  database version 3 38  This test was developed and its performance characteristics  determined by [facility]   It has not been cleared or approved  by the Food and Drug Administration    [facility] CLIA ID Number [id number]  This test was performed using PCR (Polymerase Chain Reaction)/SSOP  (Sequence Specific Oligonucleotide Probes) technique  SBT (Sequence  Based Typing) and/or SSP (Sequence Specific Primers) may be used as  supplemental methods when necessary  Please contact [facility] Customer  Service at [phone] if you have any questions  Director of [facility]   Dr Sia Wilde, PhD    Ferritin 08/28/2020 40  8 - 388 ng/mL Final    ds DNA Ab 08/28/2020 1  0 - 9 IU/mL Final                                       Negative      <5                                     Equivocal  5 - 9                                     Positive      >9   Orders Only on 07/14/2020   Component Date Value Ref Range Status    SARS-CoV-2  07/14/2020 Not Detected  Not Detected Final    This test was developed and its performance characteristics determined  by [facility]  This test has not been FDA cleared or  approved  This test has been authorized by FDA under an Emergency Use  Authorization (EUA)  This test is only authorized for the duration of  time the declaration that circumstances exist justifying the  authorization of the emergency use of in vitro diagnostic tests for  detection of SARS-CoV-2 virus and/or diagnosis of COVID-19 infection  under section 564(b)(1) of the Act, 21 U  S C  490NKR-3(C)(0), unless  the authorization is terminated or revoked sooner  When diagnostic testing is negative, the possibility of a false  negative result should be considered in the context of a patient's  recent exposures and the presence of clinical signs and symptoms  consistent with COVID-19  An individual without symptoms of COVID-19  and who is not shedding SARS-CoV-2 virus would expect to have a  negative (not detected) result in this assay     [facility] 07/14/2020 Comment   Final    Received   Appointment on 05/09/2020   Component Date Value Ref Range Status    Sed Rate 05/09/2020 4  0 - 20 mm/hour Final    CRP 05/09/2020 <3 0  <3 0 mg/L Final    Miscellaneous Lab Test Result 05/09/2020 SEE WRITTEN REPORT   Final    SS-A (RO) Ab 05/09/2020 <0 2  0 0 - 0 9 AI Final    SS-B (LA) Ab 05/09/2020 <0 2  0 0 - 0 9 AI Final    Uric Acid 05/09/2020 2 9  2 0 - 6 8 mg/dL Final    Specimen collection should occur prior to Metamizole administration due to the potential for falsely depressed results  Telemedicine on 05/08/2020   Component Date Value Ref Range Status    Sodium 05/09/2020 138  136 - 145 mmol/L Final    Potassium 05/09/2020 3 5  3 5 - 5 3 mmol/L Final    Chloride 05/09/2020 104  100 - 108 mmol/L Final    CO2 05/09/2020 25  21 - 32 mmol/L Final    ANION GAP 05/09/2020 9  4 - 13 mmol/L Final    BUN 05/09/2020 10  5 - 25 mg/dL Final    Creatinine 05/09/2020 0 99  0 60 - 1 30 mg/dL Final    Standardized to IDMS reference method    Glucose, Fasting 05/09/2020 92  65 - 99 mg/dL Final      Specimen collection should occur prior to Sulfasalazine administration due to the potential for falsely depressed results  Specimen collection should occur prior to Sulfapyridine administration due to the potential for falsely elevated results   Calcium 05/09/2020 8 6  8 3 - 10 1 mg/dL Final    AST 05/09/2020 13  5 - 45 U/L Final      Specimen collection should occur prior to Sulfasalazine administration due to the potential for falsely depressed results   ALT 05/09/2020 31  12 - 78 U/L Final      Specimen collection should occur prior to Sulfasalazine administration due to the potential for falsely depressed results   Alkaline Phosphatase 05/09/2020 52  46 - 116 U/L Final    Total Protein 05/09/2020 7 2  6 4 - 8 2 g/dL Final    Albumin 05/09/2020 3 8  3 5 - 5 0 g/dL Final    Total Bilirubin 05/09/2020 0 23  0 20 - 1 00 mg/dL Final      Use of this assay is not recommended for patients undergoing treatment with eltrombopag due to the potential for falsely elevated results   eGFR 05/09/2020 77  ml/min/1 73sq m Final    Hemoglobin A1C 05/09/2020 5 0  Normal 3 8-5 6%; PreDiabetic 5 7-6 4%;  Diabetic >=6 5%; Glycemic control for adults with diabetes <7 0% % Final    EAG 05/09/2020 97  mg/dl Final    WBC 05/09/2020 4 60  4 31 - 10 16 Thousand/uL Final    RBC 05/09/2020 4 32  3 81 - 5 12 Million/uL Final    Hemoglobin 05/09/2020 13 4  11 5 - 15 4 g/dL Final    Hematocrit 05/09/2020 39 4  34 8 - 46 1 % Final    MCV 05/09/2020 91  82 - 98 fL Final    MCH 05/09/2020 31 0  26 8 - 34 3 pg Final    MCHC 05/09/2020 34 0  31 4 - 37 4 g/dL Final    RDW 05/09/2020 11 5* 11 6 - 15 1 % Final    MPV 05/09/2020 10 9  8 9 - 12 7 fL Final    Platelets 52/88/0241 233  149 - 390 Thousands/uL Final    nRBC 05/09/2020 0  /100 WBCs Final    Neutrophils Relative 05/09/2020 51  43 - 75 % Final    Immat GRANS % 05/09/2020 0  0 - 2 % Final    Lymphocytes Relative 05/09/2020 38  14 - 44 % Final    Monocytes Relative 05/09/2020 6  4 - 12 % Final    Eosinophils Relative 05/09/2020 4  0 - 6 % Final    Basophils Relative 05/09/2020 1  0 - 1 % Final    Neutrophils Absolute 05/09/2020 2 35  1 85 - 7 62 Thousands/µL Final    Immature Grans Absolute 05/09/2020 0 02  0 00 - 0 20 Thousand/uL Final    Lymphocytes Absolute 05/09/2020 1 76  0 60 - 4 47 Thousands/µL Final    Monocytes Absolute 05/09/2020 0 27  0 17 - 1 22 Thousand/µL Final    Eosinophils Absolute 05/09/2020 0 16  0 00 - 0 61 Thousand/µL Final    Basophils Absolute 05/09/2020 0 04  0 00 - 0 10 Thousands/µL Final    TSH 3RD GENERATON 05/09/2020 3 225  0 358 - 3 740 uIU/mL Final      The recommended reference ranges for TSH during pregnancy are as follows:   First trimester 0 1 to 2 5 uIU/mL   Second trimester  0 2 to 3 0 uIU/mL   Third trimester 0 3 to 3 0 uIU/m    Note: Normal ranges may not apply to patients who are transgender, non-binary, or whose legal sex, sex at birth, and gender identity differ    Using supplements with high doses of biotin 20 to more than 300 times greater than the adequate daily intake for adults of 30 mcg/day as established by the [employer] [employer], can cause falsely depress results   Lyme IgG/IgM Ab 05/09/2020 <0 91  0 00 - 0 90 ISR Final                                    Negative         <0 91                                  Equivocal  0 91 - 1 09                                  Positive         >1 09    Cholesterol 05/09/2020 209* 50 - 200 mg/dL Final      Cholesterol:       Desirable         <200 mg/dl       Borderline         200-239 mg/dl       High              >239           Triglycerides 05/09/2020 53  <=150 mg/dL Final      Triglyceride:     Normal          <150 mg/dl     Borderline High 150-199 mg/dl     High            200-499 mg/dl        Very High       >499 mg/dl    Specimen collection should occur prior to N-Acetylcysteine or Metamizole administration due to the potential for falsely depressed results   HDL, Direct 05/09/2020 67  >=40 mg/dL Final      HDL Cholesterol:       Low     <41 mg/dL  Specimen collection should occur prior to Metamizole administration due to the potential for falsley depressed results   LDL Calculated 05/09/2020 131* 0 - 100 mg/dL Final      LDL Cholesterol:     Optimal           <100 mg/dl     Near Optimal      100-129 mg/dl     Above Optimal       Borderline High 130-159 mg/dl       High            160-189 mg/dl       Very High       >189 mg/dl         This screening LDL is a calculated result  It does not have the accuracy of the Direct Measured LDL in the monitoring of patients with hyperlipidemia and/or statin therapy  Direct Measure LDL (DPM736) must be ordered separately in these patients   Non-HDL-Chol (CHOL-HDL) 05/09/2020 142  mg/dl Final    DEBBY 05/09/2020 Negative  Negative Final    Rheumatoid Factor 05/09/2020 Negative  Negative Final    Vit D, 25-Hydroxy 05/09/2020 33 3  30 0 - 100 0 ng/mL Final    Iron Saturation 05/09/2020 39  % Final    TIBC 05/09/2020 272  250 - 450 ug/dL Final    Iron 05/09/2020 107  50 - 170 ug/dL Final      Patients treated with metal-binding drugs (ie   Deferoxamine) may have depressed iron values      Ferritin 05/09/2020 48  8 - 388 ng/mL Final   Office Visit on 04/22/2020   Component Date Value Ref Range Status    LEUKOCYTE ESTERASE,UA 04/22/2020 trace   Final    NITRITE,UA 04/22/2020 +   Final    SL AMB POCT UROBILINOGEN 04/22/2020 4 0   Final    POCT URINE PROTEIN 04/22/2020 100   Final     PH,UA 04/22/2020 6 0   Final    BLOOD,UA 04/22/2020 -   Final    SPECIFIC GRAVITY,UA 04/22/2020 1 020   Final    KETONES,UA 04/22/2020 -   Final    BILIRUBIN,UA 04/22/2020 small   Final    GLUCOSE, UA 04/22/2020 100   Final     COLOR,UA 04/22/2020 orange   Final    CLARITY,UA 04/22/2020 cloudy   Final    Urine Culture 04/22/2020 <10,000 cfu/ml Gram Negative Blake Enteric Like*  Final   Orders Only on 03/27/2020   Component Date Value Ref Range Status    Coronavirus 2019-nCoV 03/27/2020 Not Detected  Not Detected Final

## 2020-10-09 NOTE — PATIENT INSTRUCTIONS
Do labs when you get the chance  Continue celecoxib twice a day as needed for joint pain  Start hydroxychloroquine daily; get regular eye exams  Decrease prednisone by 2 5mg increments every week until off    Return to clinic in 5 months    Connective Tissue Disorders   AMBULATORY CARE:   A connective tissue disorder  can affect any connective tissue in your body  Connective tissues support your organs, attach muscles to bones, and create scar tissue after an injury  Cartilage is an example of connective tissue  There are many types of connective tissue disorders, such as rheumatoid arthritis, lupus, and scleroderma  The most common affected areas are joints, muscles, and skin  Your organs, eyes, nervous system, and blood vessels can also be affected  Common signs and symptoms of a connective tissue disorder:  Signs and symptoms depend on the type of connective tissue disorder and if it is severe  Symptoms may be mild or severe, and may come and go:  · Fever or fatigue    · Skin rash or thickening, blisters, or sensitivity to sunlight    · Rash on your cheeks that goes across your nose    · Joint pain, swelling, or warmth    · Deformed joints, or limited range of motion    · Cold, numb, or swollen fingers    · Loss of appetite, or weight loss without trying    · Dry mouth or eyes, vision problems, or an eye infection such as conjunctivitis    · Hair loss  Call 911 for any of the following:   · You are sweating, and your lips are pale or blue  · You have trouble breathing, chest pain or pressure, or a fast heartbeat  · You are vomiting blood  · You have a high fever  Seek care immediately if:   · You lose feeling in your hands or feet  · You lose feeling on one side of your body  · You have sudden pain in your eyes and vision problems    Contact your healthcare provider if:   · You have trouble urinating, or you urinate less than usual     · You have trouble having a bowel movement, or you lose control of your bowel movements  · Your muscle or joint tightness worsens, or your fingers begin to curl  · Your symptoms get worse, even after treatment  · Your skin is itchy, swollen, or has a rash  · You have questions or concerns about your condition or care  Treatment  may include any of the following:  · Medicines  may be given to prevent your immune system from attacking healthy cells  You may also need medicines to stop the disease from getting worse  You may need to use topical creams or lotions to control a rash or other symptoms that affect your skin  · Prescription pain medicine  may be given  Ask your healthcare provider how to take this medicine safely  Some prescription pain medicines contain acetaminophen  Do not take other medicines that contain acetaminophen without talking to your healthcare provider  Too much acetaminophen may cause liver damage  Prescription pain medicine may cause constipation  Ask your healthcare provider how to prevent or treat constipation  · NSAIDs , such as ibuprofen, help decrease swelling, pain, and fever  This medicine is available with or without a doctor's order  NSAIDs can cause stomach bleeding or kidney problems in certain people  If you take blood thinner medicine, always ask your healthcare provider if NSAIDs are safe for you  Always read the medicine label and follow directions  · Acetaminophen  helps reduce pain and fever  Acetaminophen is available without a doctor's order  Ask how much to take and how often to take it  Follow directions  Acetaminophen can cause liver problems if not taken correctly  · Steroids  may be given to reduce swelling and pain  Manage your connective tissue disorder:   · Rest as needed  Talk to your healthcare provider if you are having trouble sleeping because of pain or other symptoms  Rest your joints if they are stiff or painful   Your healthcare provider may suggest support devices such as crutches or splints to help your joints rest      · Eat a variety of healthy foods  Healthy foods include fruits, vegetables, lean meats, fish, and low-fat dairy products  Work with your healthcare provider or dietitian to create healthy meal plans  · Go to physical or occupational therapy as directed  A physical therapist can help you create an exercise plan  Exercise may help increase your energy  Exercise can also help keep stiff joints flexible and increase range of motion  An occupational therapist can help you learn to do your daily activities when you have pain or swelling  · Talk to your healthcare provider about pregnancy  If you are a woman and want to get pregnant, talk to your healthcare provider  You or your baby might be at risk for complications  You may need to wait until your disease is controlled or your medications are finished before you get pregnant  You may also have trouble getting pregnant because of your disease  Your healthcare provider may be able to suggest ways to improve your ability to become pregnant  · Do not smoke  Nicotine and other chemicals in cigarettes and cigars can cause blood vessel and lung damage  Ask your healthcare provider for information if you currently smoke and need help to quit  E-cigarettes or smokeless tobacco still contain nicotine  Talk to your healthcare provider before you use these products  · Manage stress  Stress may slow healing and lead to illness  Learn ways to control stress, such as relaxation, deep breathing, or listening to music  Manage flares:  A flare means something triggered your symptoms  Stress, cold weather, and sunlight are examples of triggers  Your healthcare provider can help you create a management plan that includes what to do if you have a flare  Treat flares quickly to help prevent serious illness  · Apply ice or heat as directed  Ice helps reduce pain and swelling, and may help prevent tissue damage   Use an ice pack, or put crushed ice in a bag  Cover the bag with a towel and apply to the painful area for 15 to 20 minutes every hour, or as directed  Heat helps reduce pain and muscle spasms  Apply a warm compress to the area for 20 minutes every 2 hours, or as directed  · Elevate the area above the level of your heart  Elevation can help reduce swelling and pain, especially in your joints  Elevate the area as often as possible  · Keep your hands and feet warm  Certain connective tissue disorders can cause your hands and feet to become cold and painful  Over time, ulcers or gangrene (tissue death) may develop if frequent or severe attacks are not prevented  Dress warmly in cold weather, including gloves and thick socks  It may help to wiggle your fingers or toes to improve circulation  Follow up with your healthcare provider as directed: You may need ongoing tests or treatment  Write down your questions so you remember to ask them during your visits  © 2017 [employer] Information is for End User's use only and may not be sold, redistributed or otherwise used for commercial purposes  All illustrations and images included in CareNotes® are the copyrighted property of [employer]  or [place]  The above information is an  only  It is not intended as medical advice for individual conditions or treatments  Talk to your doctor, nurse or pharmacist before following any medical regimen to see if it is safe and effective for you

## 2020-10-12 LAB
ACE SERPL-CCNC: 24 U/L (ref 14–82)
ALDOLASE SERPL-CCNC: 4.2 U/L (ref 3.3–10.3)

## 2020-10-28 ENCOUNTER — OFFICE VISIT (OUTPATIENT)
Dept: OBGYN CLINIC | Facility: CLINIC | Age: 30
End: 2020-10-28
Payer: COMMERCIAL

## 2020-10-28 VITALS
DIASTOLIC BLOOD PRESSURE: 82 MMHG | TEMPERATURE: 98.9 F | BODY MASS INDEX: 24.15 KG/M2 | WEIGHT: 127.8 LBS | SYSTOLIC BLOOD PRESSURE: 116 MMHG

## 2020-10-28 DIAGNOSIS — N39.0 FREQUENT UTI: ICD-10-CM

## 2020-10-28 DIAGNOSIS — N39.41 URGE INCONTINENCE OF URINE: ICD-10-CM

## 2020-10-28 DIAGNOSIS — R39.15 URINARY URGENCY: Primary | ICD-10-CM

## 2020-10-28 LAB
BACTERIA UR QL AUTO: ABNORMAL /HPF
BILIRUB UR QL STRIP: NEGATIVE
CLARITY UR: CLEAR
COLOR UR: YELLOW
GLUCOSE UR STRIP-MCNC: NEGATIVE MG/DL
HGB UR QL STRIP.AUTO: NEGATIVE
HYALINE CASTS #/AREA URNS LPF: ABNORMAL /LPF
KETONES UR STRIP-MCNC: NEGATIVE MG/DL
LEUKOCYTE ESTERASE UR QL STRIP: NEGATIVE
NITRITE UR QL STRIP: NEGATIVE
NON-SQ EPI CELLS URNS QL MICRO: ABNORMAL /HPF
PH UR STRIP.AUTO: 6 [PH]
PROT UR STRIP-MCNC: NEGATIVE MG/DL
RBC #/AREA URNS AUTO: ABNORMAL /HPF
SL AMB LEUKOCYTE ESTERASE,UA: ABNORMAL
SL AMB POCT BLOOD,UA: ABNORMAL
SL AMB POCT CLARITY,UA: ABNORMAL
SL AMB POCT COLOR,UA: YELLOW
SP GR UR STRIP.AUTO: 1.02 (ref 1–1.03)
UROBILINOGEN UR QL STRIP.AUTO: 0.2 E.U./DL
WBC #/AREA URNS AUTO: ABNORMAL /HPF

## 2020-10-28 PROCEDURE — 81001 URINALYSIS AUTO W/SCOPE: CPT | Performed by: NURSE PRACTITIONER

## 2020-10-28 PROCEDURE — 87086 URINE CULTURE/COLONY COUNT: CPT | Performed by: NURSE PRACTITIONER

## 2020-10-28 PROCEDURE — 99213 OFFICE O/P EST LOW 20 MIN: CPT | Performed by: NURSE PRACTITIONER

## 2020-10-28 PROCEDURE — 81002 URINALYSIS NONAUTO W/O SCOPE: CPT | Performed by: NURSE PRACTITIONER

## 2020-10-28 RX ORDER — NITROFURANTOIN MACROCRYSTALS 50 MG/1
50 CAPSULE ORAL
Qty: 14 CAPSULE | Refills: 1 | Status: ON HOLD | OUTPATIENT
Start: 2020-10-28 | End: 2022-06-01

## 2020-10-30 LAB — BACTERIA UR CULT: NORMAL

## 2020-10-31 ENCOUNTER — TELEPHONE (OUTPATIENT)
Dept: OBGYN CLINIC | Facility: CLINIC | Age: 30
End: 2020-10-31

## 2020-11-03 ENCOUNTER — ANNUAL EXAM (OUTPATIENT)
Dept: OBGYN CLINIC | Facility: CLINIC | Age: 30
End: 2020-11-03
Payer: COMMERCIAL

## 2020-11-03 VITALS
DIASTOLIC BLOOD PRESSURE: 74 MMHG | TEMPERATURE: 97 F | HEIGHT: 60 IN | SYSTOLIC BLOOD PRESSURE: 120 MMHG | WEIGHT: 128.6 LBS | BODY MASS INDEX: 25.25 KG/M2

## 2020-11-03 DIAGNOSIS — N76.0 BV (BACTERIAL VAGINOSIS): ICD-10-CM

## 2020-11-03 DIAGNOSIS — Z01.419 ENCOUNTER FOR GYNECOLOGICAL EXAMINATION (GENERAL) (ROUTINE) WITHOUT ABNORMAL FINDINGS: Primary | ICD-10-CM

## 2020-11-03 DIAGNOSIS — B96.89 BV (BACTERIAL VAGINOSIS): ICD-10-CM

## 2020-11-03 DIAGNOSIS — Z11.51 SCREENING FOR HUMAN PAPILLOMAVIRUS (HPV): ICD-10-CM

## 2020-11-03 PROCEDURE — G0145 SCR C/V CYTO,THINLAYER,RESCR: HCPCS | Performed by: OBSTETRICS & GYNECOLOGY

## 2020-11-03 PROCEDURE — 87624 HPV HI-RISK TYP POOLED RSLT: CPT | Performed by: OBSTETRICS & GYNECOLOGY

## 2020-11-03 PROCEDURE — 99395 PREV VISIT EST AGE 18-39: CPT | Performed by: OBSTETRICS & GYNECOLOGY

## 2020-11-03 RX ORDER — METRONIDAZOLE 500 MG/1
500 TABLET ORAL EVERY 12 HOURS SCHEDULED
Qty: 14 TABLET | Refills: 0 | Status: SHIPPED | OUTPATIENT
Start: 2020-11-03 | End: 2020-11-10

## 2020-11-04 LAB
HPV HR 12 DNA CVX QL NAA+PROBE: NEGATIVE
HPV16 DNA CVX QL NAA+PROBE: NEGATIVE
HPV18 DNA CVX QL NAA+PROBE: NEGATIVE

## 2020-11-06 ENCOUNTER — PROCEDURE VISIT (OUTPATIENT)
Dept: NEUROLOGY | Facility: CLINIC | Age: 30
End: 2020-11-06
Payer: COMMERCIAL

## 2020-11-06 VITALS — SYSTOLIC BLOOD PRESSURE: 104 MMHG | DIASTOLIC BLOOD PRESSURE: 68 MMHG | TEMPERATURE: 98.3 F

## 2020-11-06 DIAGNOSIS — G43.701 CHRONIC MIGRAINE WITHOUT AURA WITH STATUS MIGRAINOSUS, NOT INTRACTABLE: Primary | ICD-10-CM

## 2020-11-06 DIAGNOSIS — G43.709 CHRONIC MIGRAINE WITHOUT AURA WITHOUT STATUS MIGRAINOSUS, NOT INTRACTABLE: ICD-10-CM

## 2020-11-06 PROCEDURE — 64615 CHEMODENERV MUSC MIGRAINE: CPT | Performed by: PHYSICIAN ASSISTANT

## 2020-11-06 RX ORDER — TOPIRAMATE 50 MG/1
TABLET, FILM COATED ORAL
Qty: 270 TABLET | Refills: 3 | Status: SHIPPED | OUTPATIENT
Start: 2020-11-06 | End: 2021-10-01 | Stop reason: SDUPTHER

## 2020-11-09 LAB
LAB AP GYN PRIMARY INTERPRETATION: NORMAL
LAB AP LMP: NORMAL
Lab: NORMAL

## 2020-11-16 ENCOUNTER — TELEPHONE (OUTPATIENT)
Dept: OBGYN CLINIC | Facility: HOSPITAL | Age: 30
End: 2020-11-16

## 2020-11-16 DIAGNOSIS — M35.9 UNDIFFERENTIATED CONNECTIVE TISSUE DISEASE (HCC): ICD-10-CM

## 2020-11-16 RX ORDER — PREDNISONE 2.5 MG
TABLET ORAL
Qty: 30 TABLET | Refills: 1 | Status: SHIPPED | OUTPATIENT
Start: 2020-11-16 | End: 2021-05-04

## 2020-11-25 ENCOUNTER — TELEPHONE (OUTPATIENT)
Dept: NEUROLOGY | Facility: CLINIC | Age: 30
End: 2020-11-25

## 2020-11-25 RX ORDER — PREDNISONE 1 MG/1
TABLET ORAL
Qty: 60 TABLET | Refills: 1 | Status: SHIPPED | OUTPATIENT
Start: 2020-11-25 | End: 2021-05-04

## 2020-12-04 ENCOUNTER — TELEPHONE (OUTPATIENT)
Dept: NEUROLOGY | Facility: CLINIC | Age: 30
End: 2020-12-04

## 2020-12-20 ENCOUNTER — IMMUNIZATIONS (OUTPATIENT)
Dept: FAMILY MEDICINE CLINIC | Facility: HOSPITAL | Age: 30
End: 2020-12-20
Payer: COMMERCIAL

## 2020-12-20 DIAGNOSIS — Z23 ENCOUNTER FOR IMMUNIZATION: ICD-10-CM

## 2020-12-20 PROCEDURE — 91300 SARS-COV-2 / COVID-19 MRNA VACCINE (PFIZER-BIONTECH) 30 MCG: CPT

## 2020-12-20 PROCEDURE — 0001A SARS-COV-2 / COVID-19 MRNA VACCINE (PFIZER-BIONTECH) 30 MCG: CPT

## 2020-12-22 ENCOUNTER — TELEPHONE (OUTPATIENT)
Dept: NEUROLOGY | Facility: CLINIC | Age: 30
End: 2020-12-22

## 2021-01-08 ENCOUNTER — OFFICE VISIT (OUTPATIENT)
Dept: NEUROLOGY | Facility: CLINIC | Age: 31
End: 2021-01-08
Payer: COMMERCIAL

## 2021-01-08 VITALS
HEART RATE: 102 BPM | DIASTOLIC BLOOD PRESSURE: 88 MMHG | WEIGHT: 135 LBS | SYSTOLIC BLOOD PRESSURE: 138 MMHG | HEIGHT: 61 IN | BODY MASS INDEX: 25.49 KG/M2

## 2021-01-08 DIAGNOSIS — M79.18 CERVICAL MYOFASCIAL PAIN SYNDROME: ICD-10-CM

## 2021-01-08 DIAGNOSIS — G43.701 CHRONIC MIGRAINE WITHOUT AURA WITH STATUS MIGRAINOSUS, NOT INTRACTABLE: Primary | ICD-10-CM

## 2021-01-08 PROCEDURE — 20553 NJX 1/MLT TRIGGER POINTS 3/>: CPT | Performed by: PHYSICIAN ASSISTANT

## 2021-01-08 PROCEDURE — 99215 OFFICE O/P EST HI 40 MIN: CPT | Performed by: PHYSICIAN ASSISTANT

## 2021-01-08 RX ORDER — BUPIVACAINE HYDROCHLORIDE 2.5 MG/ML
4 INJECTION, SOLUTION EPIDURAL; INFILTRATION; INTRACAUDAL ONCE
Status: COMPLETED | OUTPATIENT
Start: 2021-01-08 | End: 2021-01-08

## 2021-01-08 RX ORDER — PROCHLORPERAZINE MALEATE 10 MG
10 TABLET ORAL EVERY 6 HOURS PRN
Qty: 10 TABLET | Refills: 0 | Status: SHIPPED | OUTPATIENT
Start: 2021-01-08 | End: 2021-10-01 | Stop reason: SDUPTHER

## 2021-01-08 RX ORDER — RIMEGEPANT SULFATE 75 MG/75MG
75 TABLET, ORALLY DISINTEGRATING ORAL AS NEEDED
Qty: 8 TABLET | Refills: 3 | Status: SHIPPED | OUTPATIENT
Start: 2021-01-08

## 2021-01-08 RX ORDER — DEXAMETHASONE 1 MG
1 TABLET ORAL
Qty: 5 TABLET | Refills: 0 | Status: SHIPPED | OUTPATIENT
Start: 2021-01-08 | End: 2021-04-16 | Stop reason: SDUPTHER

## 2021-01-08 RX ADMIN — BUPIVACAINE HYDROCHLORIDE 4 ML: 2.5 INJECTION, SOLUTION EPIDURAL; INFILTRATION; INTRACAUDAL at 14:30

## 2021-01-08 NOTE — PROGRESS NOTES
[facility] [facility]  PATIENT:  Cait Suggs  MRN:  [id number]  :  1990  DATE OF SERVICE:  2021      Assessment/Plan:     Chronic migraine without aura with status migrainosus, not intractable  PREVENTATIVE  Continue Botox every 3 months  Topamax 50 mg BID  Continue all your other medications prescribed  Cyclobenzaprine 5 mg at bedtime    ABORTIVE:  At onset of migraine, use Nurtec 75 mg  Limit 1 of in 24 hours  In addition you can try Prochlorperazine 10 mg to break your migraine, either alone or with imitrex  If repeat the Sumatriptan or still have lingering pain Toradol 10 mg at bedtime  If you wake up next day with a migraine, Decadron 1 mg    Cervical myofascial pain syndrome  Continue trigger point injections every 3 months         Problem List Items Addressed This Visit        Cardiovascular and Mediastinum    Chronic migraine without aura with status migrainosus, not intractable - Primary     PREVENTATIVE  Continue Botox every 3 months  Topamax 50 mg BID  Continue all your other medications prescribed  Cyclobenzaprine 5 mg at bedtime    ABORTIVE:  At onset of migraine, use Nurtec 75 mg  Limit 1 of in 24 hours      In addition you can try Prochlorperazine 10 mg to break your migraine, either alone or with imitrex  If repeat the Sumatriptan or still have lingering pain Toradol 10 mg at bedtime  If you wake up next day with a migraine, Decadron 1 mg         Relevant Medications    bupivacaine (PF) (MARCAINE) 0 25 % injection 4 mL (Completed)    prochlorperazine (COMPAZINE) 10 mg tablet    Rimegepant Sulfate (Nurtec) 75 MG TBDP    dexamethasone (DECADRON) 1 mg tablet       Musculoskeletal and Integument    Cervical myofascial pain syndrome     Continue trigger point injections every 3 months         Relevant Medications    bupivacaine (PF) (MARCAINE) 0 25 % injection 4 mL (Completed)    triamcinolone acetonide (KENALOG-10) 10 mg/mL injection 10 mg (Completed)    Other Relevant Orders    Inj Trigger Point Single/Multiple              History of Present Illness: We had the pleasure of evaluating Maria Isabel Goodman in neurological follow up  today for headaches  As you know,  she is a 27 y o   left handed female  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 PREVENTATIVE:  Topamax, Inderal (hypotension and no help), butterbur, B2, Magnesium, Gabapentin (irritablity/anxiety), Tegretol (intially helped but then failed), Trileptal (skin reaction-allergy vs Retia Everts), Botox  ABORTIVE:  Rizatriptan (ineffective), Imitrex nasal spray (made vomit), Naproxen, Excedrin, Fioricet, Nucynta, Tylenol, Skelaxin, Ibuprofen, Sumatriptan     Alternative therapies used in the past for headaches? Physical therapy, massage  Headache are worse if the patient: moving bowel, concentration, clenching jaw  Headache triggers:  Menses, stress, Caffeine (too little or too much), weather changes, missing meals, exercise, fatigue     Aura/warning and how long does it last - none     What is your current pain level - 3/10      Any family history of migraines? Yes, mother, maternal aunts, maternal grandmother  Any family history of aneurysms? No     Headaches started at what age? 9years old  How often do the headaches occur? 8-10 a month prior was daily*  What time of the day do the headaches start? Usually wakes up with them  How long do the headaches last? All day  Are you ever headache free? Yes  Describe your usual headache - Throbbing, crushing, pulsating, Pressure,  Sharp, Stabbing,  Nagging  Where is your headache located? Behind eyes, eyebrow, frontals, occipitalis, neck  What is the intensity of pain? 4-10/10; last 10/10 was Saturday; average 5-6/10  Associated symptoms:   · nausea, vomiting (rare)  · phonophobia   · Problem with concentration  · Blurred vision  · Red ear   · Lacrimation, runny or stuffed-up nose, flushing of face  · light-headed or dizzy, stiff or sore neck   · Hands or feet tingle or feel numb, prefer to be alone and in a quiet room, unable to work     Number of days missed per month because of headaches:  Work (or school) days: 0 (doesn't miss but may go home early 2-3)  Social or Family activities: 2-3      What time of the year do headaches occur more frequently?   none  Have you seen someone else for headaches or pain? Yes, [facility]  Have you had trigger point injection performed and how often? No  Have you had Botox injection performed and how often? Yes   Have you had epidural injections or transforaminal injections performed? No     Have you used CBD or THC for your headaches and how often? No  Are you current pregnant or planning on getting pregnant? No, has Mirena  Have you ever had any Brain imaging? yes       With botox has had a reduction of at least 7 migraine days with less abortive medication, less ER visits which correlates to headache diary             Inj Trigger Point Single/Multiple     Date/Time 1/8/2021 1:40 PM     Performed by  Joaquina Mathew PA-C     Authorized by Joaquina Mathew PA-C      Universal Protocol   Consent: Verbal consent obtained  Written consent obtained  Risks and benefits: risks, benefits and alternatives were discussed  Consent given by: patient  Time out: Immediately prior to procedure a "time out" was called to verify the correct patient, procedure, equipment, support staff and site/side marked as required    Patient understanding: patient states understanding of the procedure being performed  Patient consent: the patient's understanding of the procedure matches consent given  Procedure consent: procedure consent matches procedure scheduled  Relevant documents: relevant documents present and verified  Patient identity confirmed: verbally with patient        Local anesthesia used: no     Anesthesia   Local anesthesia used: no     Sedation   Patient sedated: no        Specimen: no    Culture: no   Procedure Details   Procedure Notes: Using 4 ml of 0 25%bupivicaine and 1 ml of Kenalog 10mg multiple injections were placed in her trapezius bilaterally  This is part of ongoing and continuous management of her care  Patient tolerance: patient tolerated the procedure well with no immediate complications           Reviewed old notes from physician seen in the past- see above HPI for summary of previous encounters         Past Medical History:   Diagnosis Date    Anxiety     Depression     Encounter for IUD removal and reinsertion 5/29/2019    Migraines        Patient Active Problem List   Diagnosis    Bipolar 1 disorder, mixed ([facility] [address] )    Chronic migraine without aura with status migrainosus, not intractable    Chronic pain disorder    Migraine    Primary insomnia    Acquired hypothyroidism    Hyperglycemia    Mixed hyperlipidemia    Irritable bowel syndrome with constipation    Cervicogenic headache    Hypocalcemia    Other idiopathic scoliosis, thoracolumbar region    Back pain of lumbar region with sciatica    Paresthesias    Renal calculi    Dry eye syndrome, bilateral    Raynaud's disease without gangrene    Arthralgia    Painful swelling of joint    Easy bruising    Other fatigue    Fever in adult    Exposure to SARS-associated coronavirus    Urge incontinence of urine    Urinary urgency    Frequent UTI    Anxiety    Cervical myofascial pain syndrome       Medications:      Current Outpatient Medications   Medication Sig Dispense Refill    amphetamine-dextroamphetamine (ADDERALL) 20 mg tablet Take 20 mg by mouth daily   0    buPROPion (WELLBUTRIN SR) 150 mg 12 hr tablet Take 150 mg by mouth 2 (two) times a day       celecoxib (CeleBREX) 200 mg capsule Take 1 capsule (200 mg total) by mouth 2 (two) times a day (Patient taking differently: Take 200 mg by mouth as needed ) 180 capsule 1    cyclobenzaprine (FLEXERIL) 5 mg tablet Take 2 tablets (10 mg total) by mouth daily at bedtime (Patient taking differently: Take 10 mg by mouth as needed ) 180 tablet 3    diazepam (VALIUM) 5 mg tablet Take 5 mg by mouth every 6 (six) hours as needed       fexofenadine (ALLEGRA) 180 MG tablet Take 180 mg by mouth daily      FLUoxetine (PROzac) 10 mg capsule Take 10 mg by mouth daily       hydroxychloroquine (PLAQUENIL) 200 mg tablet Take 1 tablet (200 mg total) by mouth daily 90 tablet 1    Ibuprofen 200 MG CAPS Take 800 mg by mouth every 6 (six) hours as needed      lamoTRIgine (LaMICtal) 100 mg tablet Take 100 mg by mouth daily       levonorgestrel (KYLEENA) 19 5 MG intrauterine device 1 Intra Uterine Device by Intrauterine route once      levothyroxine 100 mcg tablet Take 1 tablet (100 mcg total) by mouth daily 30 tablet 11    OLANZapine-FLUoxetine (SYMBYAX) 3-25 MG per capsule Take 1 capsule by mouth every evening      polyethylene glycol (MIRALAX) 17 g packet Take 17 g by mouth daily      SUMAtriptan (IMITREX) 100 mg tablet TAKE ONE TABLET BY MOUTH ONCE AS NEEDED FOR MIGRAINE FOR UP TO 1 DOSE  MAX 2 TABS/24 HOURS   MAX 9 TABS/MONTH  27 tablet 0    topiramate (TOPAMAX) 50 MG tablet 1 tab in the am and 2 tabs at bedtime 270 tablet 3    dexamethasone (DECADRON) 1 mg tablet Take 1 tablet (1 mg total) by mouth daily with breakfast 5 tablet 0    nitrofurantoin (MACROBID) 100 mg capsule Take 1 capsule (100 mg total) by mouth 2 (two) times a day (Patient not taking: Reported on 1/8/2021) 7 capsule 0    nitrofurantoin (MACRODANTIN) 50 mg capsule Take 1 capsule (50 mg total) by mouth daily at bedtime as needed (after intercourse) (Patient not taking: Reported on 1/8/2021) 14 capsule 1    predniSONE 2 5 mg tablet Combine to take 12 5 mg once daily  (Patient not taking: Reported on 1/8/2021) 30 tablet 1    predniSONE 5 mg tablet Take the taper as directed  (Patient not taking: Reported on 1/8/2021) 60 tablet 1    prochlorperazine (COMPAZINE) 10 mg tablet Take 1 tablet (10 mg total) by mouth every 6 (six) hours as needed (migraine) 10 tablet 0    Rimegepant Sulfate (Nurtec) 75 MG TBDP Take 75 mg by mouth as needed (migraine) 8 tablet 3     No current facility-administered medications for this visit  Allergies:       Allergies   Allergen Reactions    Oxcarbazepine Hives     Hives       Family History:     Family History   Problem Relation Age of Onset    Colon cancer Mother     Arthritis Mother     Thyroid disease Mother     Depression Mother     Anxiety disorder Mother     Clotting disorder Mother     Hyperlipidemia Mother     Vesicoureteral reflux Mother     Irritable bowel syndrome Mother     Migraines Mother     Skin cancer Mother     Thyroid disease unspecified Mother     Heart disease Father     Hypertension Father     Arthritis Father     Hyperlipidemia Father     Vesicoureteral reflux Father     Hypertension Brother     Hyperlipidemia Brother     COPD Maternal Grandmother     Stroke Maternal Grandmother     Lung cancer Maternal Grandmother     Arthritis Maternal Grandmother     Asthma Maternal Grandmother     Hyperlipidemia Maternal Grandmother     Vesicoureteral reflux Maternal Grandmother     Heart disease Maternal Grandmother     Hypertension Maternal Grandmother     Migraines Maternal Grandmother     Osteoporosis Maternal Grandmother     Heart attack Maternal Grandfather     Heart disease Maternal Grandfather     Diabetes Maternal Grandfather     Heart disease Paternal Grandmother     Hyperlipidemia Paternal Grandmother     Diabetes Paternal Grandfather     Kidney disease Maternal Aunt     Migraines Maternal Aunt     Heart attack Maternal Uncle     Heart disease Maternal Uncle     Melanoma Maternal Uncle     Lupus Cousin        Social History:     Social History     Socioeconomic History    Marital status: Single     Spouse name: Not on file    Number of children: Not on file    Years of education: Not on file    Highest education level: Not on file   Occupational History    Occupation: Physician Assistant    Social Needs    Financial resource strain: Not on file    Food insecurity     Worry: Not on file     Inability: Not on file   [employer] needs     Medical: Not on file     Non-medical: Not on file   Tobacco Use    Smoking status: Never Smoker    Smokeless tobacco: Never Used   Substance and Sexual Activity    Alcohol use: Yes     Comment: social    Drug use: Never    Sexual activity: Yes     Partners: Male     Birth control/protection: I U D  Lifestyle    Physical activity     Days per week: Not on file     Minutes per session: Not on file    Stress: Not on file   Relationships    Social connections     Talks on phone: Not on file     Gets together: Not on file     Attends Nondenominational service: Not on file     Active member of club or organization: Not on file     Attends meetings of clubs or organizations: Not on file     Relationship status: Not on file    Intimate partner violence     Fear of current or ex partner: Not on file     Emotionally abused: Not on file     Physically abused: Not on file     Forced sexual activity: Not on file   Other Topics Concern    Not on file   Social History Narrative    Not on file      I have reviewed the patient's medical, social and surgical history as well as medications in detail and updated the computerized patient record        Objective:   Physical Exam:                                                                   Vitals:            /88 (BP Location: Left arm, Patient Position: Sitting, Cuff Size: Adult)   Pulse 102   Ht 5' 1" (1 549 m)   Wt 61 2 kg (135 lb)   BMI 25 51 kg/m²   BP Readings from Last 3 Encounters: 01/08/21 138/88   11/06/20 104/68   11/03/20 120/74     Pulse Readings from Last 3 Encounters:   01/08/21 102   08/07/20 86   05/08/20 102          CONSTITUTIONAL: Well developed, well nourished, well groomed  No dysmorphic features  Eyes:  EOM normal      Neck:  Normal ROM, neck supple  HEENT:  Normocephalic atraumatic  Chest:  Respirations regular and unlabored  Psychiatric:  Normal behavior and appropriate affect      MENTAL STATUS  Orientation: Alert and oriented x 3  Fund of knowledge: Intact  MOTOR (Upper and lower extremities)   Bulk/tone/abnormal movement: Normal muscle bulk and tone  COORDINATION   Station/Gait: Normal baseline gait  Review of Systems:   Review of Systems  Constitutional: Positive for fatigue  HENT: Negative  Eyes: Positive for photophobia  Respiratory: Negative  Cardiovascular: Negative  Gastrointestinal: Positive for nausea  Endocrine: Negative  Genitourinary: Negative  Musculoskeletal: Positive for neck pain  Skin: Negative  Allergic/Immunologic: Negative  Neurological: Positive for weakness, numbness (tingling bilateral hands ) and headaches (daily HA/ increase in migraines )  Hematological: Negative  Psychiatric/Behavioral: Positive for sleep disturbance     I personally reviewed the ROS entered by the MA      Greater than 50% of the 40 minutes evaluation was a face-to-face discussion regarding  the pathophysiology of her current symptoms and further plan, as well as counseling, educating, and coordinating the patient's care including diagnostic results, impression, and recommendations, risks and benefits of treatment, instructions for disease self management, treatment instructions, follow up requirements and risk factors and risk reduction of disease  Author:  Tiffany Jeffrey PA-C 1/8/2021 2:49 PM

## 2021-01-08 NOTE — PATIENT INSTRUCTIONS
PREVENTATIVE  Continue Botox every 3 months  Topamax 50 mg BID  Continue all your other medications prescribed  Cyclobenzaprine 5 mg at bedtime    ABORTIVE:  At onset of migraine, use Nurtec 75 mg  Limit 1 of in 24 hours  In addition you can try Prochlorperazine 10 mg to break your migraine, either alone or with imitrex  If repeat the Sumatriptan or still have lingering pain Toradol 10 mg at bedtime  If you wake up next day with a migraine, Decadron 1 mg    If this does not break your migraine, call us    Neck pain:   - We discussed the role of neck pathology and poor posture, with straightening of the normal cervical lordosis, in headaches  We discussed how tightening of the neck muscles can irritate the nerves in the occipital region of her head and cause or worsen head pain  We also discussed and demonstrated neck strengthening and relaxation exercises, as well as giving written instructions on these exercises  - We talked about the importance of good posture for improving shoulder, neck, and head pain  The patient was given visualization exercises for correcting posture, which patient will practice at home  If this simple exercise does not help improve the posture, we will consider formal physical therapy in the future  Medication overuse headaches:   - We discussed medication overuse headache [facility]) and how to avoid it in the future  It was explained that all analgesics have the potential to cause medication overuse headache [facility]) and analgesic overuse can negate the effectiveness of headache preventive measures  After successful [address] treatment, preventive medications for an underlying primary headache disorder have a greater chance for success  Avoid medications with narcotics, barbiturates, or caffeine in them as these can cause rebound headaches after very few doses and can interfere with other headache medicine efficacy   Taking any analgesics for more than 2-3 days a week can cause medication overuse headache  Reproductive age women: Should take folic acid daily when taking anti-seizure drugs especially Depakote  [place] Prescription Drug Monitoring Program report was reviewed and was appropriate      Headache management instructions  - When patient has a moderate to severe headache, they should seek rest, initiate relaxation and apply cold compresses to the head  - Maintain regular sleep schedule  Adults need at least 7-8 hours of uninterrupted a night  - Limit over the counter medications such as Tylenol, Ibuprofen, Aleve, Excedrin  (No more than 3 times a week)  - Maintain headache diary  We discussed an TOM for a smart phone is "Migraine celso"  - Limit caffeine to 1-2 cups 8 to 16 oz a day or less  - Avoid dietary trigger  (aged cheese, peanuts, MSG, aspartame and nitrates)  - Patient is to have regular frequent meals to prevent headache onset  - Please drink at least 64 ounces of water a day to help remain hydrated  Please call with any questions or concerns   Office number is [phone]

## 2021-01-08 NOTE — ASSESSMENT & PLAN NOTE
PREVENTATIVE  Continue Botox every 3 months  Topamax 50 mg BID  Continue all your other medications prescribed  Cyclobenzaprine 5 mg at bedtime    ABORTIVE:  At onset of migraine, use Nurtec 75 mg  Limit 1 of in 24 hours      In addition you can try Prochlorperazine 10 mg to break your migraine, either alone or with imitrex  If repeat the Sumatriptan or still have lingering pain Toradol 10 mg at bedtime  If you wake up next day with a migraine, Decadron 1 mg

## 2021-01-08 NOTE — PROGRESS NOTES
Review of Systems   Constitutional: Positive for fatigue  HENT: Negative  Eyes: Positive for photophobia  Respiratory: Negative  Cardiovascular: Negative  Gastrointestinal: Positive for nausea  Endocrine: Negative  Genitourinary: Negative  Musculoskeletal: Positive for neck pain  Skin: Negative  Allergic/Immunologic: Negative  Neurological: Positive for weakness, numbness (tingling bilateral hands ) and headaches (daily HA/ increase in migraines )  Hematological: Negative  Psychiatric/Behavioral: Positive for sleep disturbance

## 2021-01-10 ENCOUNTER — IMMUNIZATIONS (OUTPATIENT)
Dept: FAMILY MEDICINE CLINIC | Facility: HOSPITAL | Age: 31
End: 2021-01-10

## 2021-01-10 DIAGNOSIS — Z23 ENCOUNTER FOR IMMUNIZATION: ICD-10-CM

## 2021-01-10 PROCEDURE — 91300 SARS-COV-2 / COVID-19 MRNA VACCINE (PFIZER-BIONTECH) 30 MCG: CPT

## 2021-01-10 PROCEDURE — 0002A SARS-COV-2 / COVID-19 MRNA VACCINE (PFIZER-BIONTECH) 30 MCG: CPT

## 2021-01-12 ENCOUNTER — TELEPHONE (OUTPATIENT)
Dept: NEUROLOGY | Facility: CLINIC | Age: 31
End: 2021-01-12

## 2021-01-12 NOTE — TELEPHONE ENCOUNTER
Fax received from [facility] requires PA  This is the incorrect form  Patient is a [employer] employee and prescription plan is managed by Roxi Buck  Called Roxi Buck at [phone]  PA initiated over phone  72hr turn around time

## 2021-01-14 ENCOUNTER — DOCUMENTATION (OUTPATIENT)
Dept: NEUROLOGY | Facility: CLINIC | Age: 31
End: 2021-01-14

## 2021-01-14 NOTE — PROGRESS NOTES
Patient is scheduled with Mone Tucker on 2/5/2021 in the [facility] location

## 2021-01-14 NOTE — PROGRESS NOTES
Type Date User Summary Attachment   General 01/14/2021  3:29 PM Sandra Beckham care coordination  -   Note    Botox- authorization #: [id number]- valid for 4 visits (800 units total) from 2/1/2021 until 1/31/2022   Please use our stock      Thank you     Stephayn uPlido

## 2021-01-25 ENCOUNTER — TELEPHONE (OUTPATIENT)
Dept: NEUROLOGY | Facility: CLINIC | Age: 31
End: 2021-01-25

## 2021-01-25 NOTE — TELEPHONE ENCOUNTER
Called to remind patient of their upcoming appointment in 2 weeks in [facility]  Asked if there has been any change in symptoms or if patient has any urgent concerns prior to their appointment  Patient stated that they are doing well and no changes to report  Patient made aware that we will be calling back two days prior to appointment to complete COVID screening

## 2021-01-26 ENCOUNTER — TELEMEDICINE (OUTPATIENT)
Dept: FAMILY MEDICINE CLINIC | Facility: CLINIC | Age: 31
End: 2021-01-26
Payer: COMMERCIAL

## 2021-01-26 VITALS — BODY MASS INDEX: 24.55 KG/M2 | WEIGHT: 130 LBS | HEIGHT: 61 IN

## 2021-01-26 DIAGNOSIS — M79.18 CERVICAL MYOFASCIAL PAIN SYNDROME: ICD-10-CM

## 2021-01-26 DIAGNOSIS — D89.89 AUTOIMMUNE DISORDER (HCC): ICD-10-CM

## 2021-01-26 DIAGNOSIS — E03.9 ACQUIRED HYPOTHYROIDISM: Primary | ICD-10-CM

## 2021-01-26 DIAGNOSIS — M32.9 LUPUS (HCC): ICD-10-CM

## 2021-01-26 PROCEDURE — 99214 OFFICE O/P EST MOD 30 MIN: CPT | Performed by: PHYSICIAN ASSISTANT

## 2021-01-26 NOTE — PROGRESS NOTES
Virtual Regular Visit      Assessment/Plan:    Problem List Items Addressed This Visit        Endocrine    Acquired hypothyroidism - Primary     Last TSH in May was top normal 3 point something  Patient is feeling fatigued unable to lose weight wondering if we can recheck the level  She is interested in tweaking her medication higher even if it is the same as last time in technically a high level  I do agree this is something we could try and are option would be to take either 1 of her tablets double 1 day a week or switch to 112 daily  Relevant Orders    TSH, 3rd generation with Free T4 reflex       Musculoskeletal and Integument    Cervical myofascial pain syndrome     Check vitamin-D  Relevant Orders    Vitamin D 25 hydroxy       Other    Autoimmune disorder ([facility] [address] )     Patient did see Rheumatology and even though her DEBBY is negative she is most probably unfortunately with long-term autoimmune disorder  I will give her Bactrim for her sores that she develops in her nose that does heel them better than anything she has tried so far  Follow-up with rheumatology  Other Visit Diagnoses     Lupus ([facility] [address] )        Relevant Medications    mupirocin (BACTROBAN) 2 % ointment               Reason for visit is   Chief Complaint   Patient presents with    Follow-up     Pt would like to discuss having labs drawn for thyroid  Having issues with losing weight and feeling tired  Pt also states she has lupus and has nasal sores and would like some type of rx for them   Virtual Regular Visit        Encounter provider Chasity Collins PA-C    Provider located at [address] [place] [address] PRIMARY CARE  Armida DIAZ  [address]      Recent Visits  No visits were found meeting these conditions     Showing recent visits within past 7 days and meeting all other requirements     Today's Visits  Date Type Provider Dept   01/26/21 1135 Marley Macias PA-C [facility] Showing today's visits and meeting all other requirements     Future Appointments  No visits were found meeting these conditions  Showing future appointments within next 150 days and meeting all other requirements        The patient was identified by name and date of birth  Sol Marino was informed that this is a telemedicine visit and that the visit is being conducted through [employer] and patient was informed that this is not a secure, HIPAA-compliant platform  She agrees to proceed     My office door was closed  No one else was in the room  She acknowledged consent and understanding of privacy and security of the video platform  The patient has agreed to participate and understands they can discontinue the visit at any time  Patient is aware this is a billable service  Nydia Díaz is a 27 y o  female pt   Patient is calling for 2 different things  She has not been able to lose weight she is so tired that she is coming home from working leaving for bed at 8:00 a m  [facility] She states that she has been trying to diet and exercise in her weight is not budging  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  She was told in the past that sometimes if somebody is having problems with hypothyroidism that they can take a slight increase of supplement more than the actually need to make them actually feel better  The last titer was done in May and was at the top elevated range but normal at 3 point something  Also patient did see Rheumatology with full workup even though she had negative DEBBY she has most likely connective tissue disorder and has been getting sores in her nose and in her mouth and when she does get these Bactroban add application really helps to make them feel better and he will quicker and she is wanting if she can have a refill of this         Past Medical History:   Diagnosis Date    Anxiety     Depression     Encounter for IUD removal and reinsertion 5/29/2019    Migraines        Past Surgical History:   Procedure Laterality Date    INGUINAL HERNIA REPAIR  10/1998    REDUCTION MAMMAPLASTY  03/2013    TONSILLECTOMY  06/2011    TONSILLECTOMY      WISDOM TOOTH EXTRACTION         Current Outpatient Medications   Medication Sig Dispense Refill    amphetamine-dextroamphetamine (ADDERALL) 20 mg tablet Take 20 mg by mouth daily   0    buPROPion (WELLBUTRIN SR) 150 mg 12 hr tablet Take 150 mg by mouth 2 (two) times a day       celecoxib (CeleBREX) 200 mg capsule Take 1 capsule (200 mg total) by mouth 2 (two) times a day (Patient taking differently: Take 200 mg by mouth as needed ) 180 capsule 1    cyclobenzaprine (FLEXERIL) 5 mg tablet Take 2 tablets (10 mg total) by mouth daily at bedtime (Patient taking differently: Take 10 mg by mouth as needed ) 180 tablet 3    dexamethasone (DECADRON) 1 mg tablet Take 1 tablet (1 mg total) by mouth daily with breakfast 5 tablet 0    diazepam (VALIUM) 5 mg tablet Take 5 mg by mouth every 6 (six) hours as needed       fexofenadine (ALLEGRA) 180 MG tablet Take 180 mg by mouth daily      FLUoxetine (PROzac) 10 mg capsule Take 10 mg by mouth daily       hydroxychloroquine (PLAQUENIL) 200 mg tablet Take 1 tablet (200 mg total) by mouth daily 90 tablet 1    Ibuprofen 200 MG CAPS Take 800 mg by mouth every 6 (six) hours as needed      lamoTRIgine (LaMICtal) 100 mg tablet Take 100 mg by mouth daily       levonorgestrel (KYLEENA) 19 5 MG intrauterine device 1 Intra Uterine Device by Intrauterine route once      levothyroxine 100 mcg tablet Take 1 tablet (100 mcg total) by mouth daily 30 tablet 11    OLANZapine-FLUoxetine (SYMBYAX) 3-25 MG per capsule Take 1 capsule by mouth every evening      polyethylene glycol (MIRALAX) 17 g packet Take 17 g by mouth daily      prochlorperazine (COMPAZINE) 10 mg tablet Take 1 tablet (10 mg total) by mouth every 6 (six) hours as needed (migraine) 10 tablet 0    Rimegepant Sulfate (Nurtec) 75 MG TBDP Take 75 mg by mouth as needed (migraine) 8 tablet 3    SUMAtriptan (IMITREX) 100 mg tablet TAKE ONE TABLET BY MOUTH ONCE AS NEEDED FOR MIGRAINE FOR UP TO 1 DOSE  MAX 2 TABS/24 HOURS  MAX 9 TABS/MONTH  27 tablet 0    topiramate (TOPAMAX) 50 MG tablet 1 tab in the am and 2 tabs at bedtime 270 tablet 3    mupirocin (BACTROBAN) 2 % ointment Apply topically 3 (three) times a day 30 g 5    nitrofurantoin (MACROBID) 100 mg capsule Take 1 capsule (100 mg total) by mouth 2 (two) times a day (Patient not taking: Reported on 1/8/2021) 7 capsule 0    nitrofurantoin (MACRODANTIN) 50 mg capsule Take 1 capsule (50 mg total) by mouth daily at bedtime as needed (after intercourse) (Patient not taking: Reported on 1/8/2021) 14 capsule 1    predniSONE 2 5 mg tablet Combine to take 12 5 mg once daily  (Patient not taking: Reported on 1/8/2021) 30 tablet 1    predniSONE 5 mg tablet Take the taper as directed  (Patient not taking: Reported on 1/8/2021) 60 tablet 1     No current facility-administered medications for this visit  Allergies   Allergen Reactions    Oxcarbazepine Hives     Hives       Review of Systems   Constitutional: Positive for fatigue and unexpected weight change  HENT: Negative  Eyes: Negative  Respiratory: Negative  Cardiovascular: Negative  Gastrointestinal: Negative  Endocrine: Negative  Genitourinary: Negative  Musculoskeletal: Negative  Skin:        Nasal sores   Allergic/Immunologic: Negative  Neurological: Negative  Hematological: Negative  Psychiatric/Behavioral: Negative  Video Exam    Vitals:    01/26/21 1707   Weight: 59 kg (130 lb)   Height: 5' 1" (1 549 m)       Physical Exam  Vitals signs and nursing note reviewed  Constitutional:       General: She is not in acute distress  Appearance: She is well-developed  She is not diaphoretic  HENT:      Head: Normocephalic and atraumatic     Eyes:      General:         Right eye: No discharge  Left eye: No discharge  Conjunctiva/sclera: Conjunctivae normal       Pupils: Pupils are equal, round, and reactive to light  Pulmonary:      Effort: Pulmonary effort is normal  No respiratory distress  Skin:     General: Skin is warm and dry  Neurological:      Mental Status: She is alert and oriented to person, place, and time  Psychiatric:         Behavior: Behavior normal          Thought Content: Thought content normal          Judgment: Judgment normal           I spent 15 minutes directly with the patient during this visit      VIRTUAL VISIT slinkset acknowledges that she has consented to an online visit or consultation  She understands that the online visit is based solely on information provided by her, and that, in the absence of a face-to-face physical evaluation by the physician, the diagnosis she receives is both limited and provisional in terms of accuracy and completeness  This is not intended to replace a full medical face-to-face evaluation by the physician  Maria Isabel Goodman understands and accepts these terms

## 2021-01-26 NOTE — ASSESSMENT & PLAN NOTE
Patient did see Rheumatology and even though her DEBBY is negative she is most probably unfortunately with long-term autoimmune disorder  I will give her Bactrim for her sores that she develops in her nose that does heel them better than anything she has tried so far  Follow-up with rheumatology

## 2021-01-26 NOTE — ASSESSMENT & PLAN NOTE
Last TSH in May was top normal 3 point something  Patient is feeling fatigued unable to lose weight wondering if we can recheck the level  She is interested in tweaking her medication higher even if it is the same as last time in technically a high level  I do agree this is something we could try and are option would be to take either 1 of her tablets double 1 day a week or switch to 112 daily

## 2021-02-03 ENCOUNTER — TELEPHONE (OUTPATIENT)
Dept: NEUROLOGY | Facility: CLINIC | Age: 31
End: 2021-02-03

## 2021-02-05 ENCOUNTER — PROCEDURE VISIT (OUTPATIENT)
Dept: NEUROLOGY | Facility: CLINIC | Age: 31
End: 2021-02-05
Payer: COMMERCIAL

## 2021-02-05 VITALS — HEART RATE: 94 BPM | TEMPERATURE: 97.9 F | DIASTOLIC BLOOD PRESSURE: 68 MMHG | SYSTOLIC BLOOD PRESSURE: 112 MMHG

## 2021-02-05 DIAGNOSIS — G43.701 CHRONIC MIGRAINE WITHOUT AURA WITH STATUS MIGRAINOSUS, NOT INTRACTABLE: Primary | ICD-10-CM

## 2021-02-05 PROCEDURE — 64615 CHEMODENERV MUSC MIGRAINE: CPT | Performed by: PHYSICIAN ASSISTANT

## 2021-02-05 NOTE — PROGRESS NOTES
Universal Protocol   Consent: Verbal consent obtained  Written consent obtained  Risks and benefits: risks, benefits and alternatives were discussed  Consent given by: patient  Time out: Immediately prior to procedure a "time out" was called to verify the correct patient, procedure, equipment, support staff and site/side marked as required  Patient understanding: patient states understanding of the procedure being performed  Patient consent: the patient's understanding of the procedure matches consent given  Procedure consent: procedure consent matches procedure scheduled  Relevant documents: relevant documents present and verified  Patient identity confirmed: verbally with patient        Chemodenervation     Date/Time 2/5/2021 2:07 PM     Performed by  Rene Barrow PA-C     Authorized by Rene Barrow PA-C        Pre-procedure details      Prepped With: Alcohol     Anesthesia  (see MAR for exact dosages):      Anesthesia method:  None   Procedure details     Position:  Upright   Botox     Botox Type:  Type A    Brand:  Botox    mL's of Botulinum Toxin:  200    Final Concentration per CC:  50 units    Needle Gauge:  30 G 2 5 inch   Procedures     Botox Procedures: chronic headache      Indications: migraines     Injection Location      Head / Face:  L superior cervical paraspinal, R superior cervical paraspinal, L , R , L frontalis, R frontalis, L medial occipitalis, R medial occipitalis, procerus, R temporalis, L temporalis, R superior trapezius and L superior trapezius    L  injection amount:  5 unit(s)    R  injection amount:  5 unit(s)    L lateral frontalis:  5 unit(s)    R lateral frontalis:  5 unit(s)    L medial frontalis:  5 unit(s)    R medial frontalis:  5 unit(s)    L temporalis injection amount:  20 unit(s)    R temporalis injection amount:  20 unit(s)    Procerus injection amount:  5 unit(s)    L medial occipitalis injection amount:  15 unit(s)    R medial occipitalis injection amount:  15 unit(s)    L superior cervical paraspinal injection amount:  10 unit(s)    R superior cervical paraspinal injection amount:  10 unit(s)    L superior trapezius injection amount:  15 unit(s)    R superior trapezius injection amount:  15 unit(s)   Total Units     Total units used:  200    Total units discarded:  0   Post-procedure details      Chemodenervation:  Chronic migraine    Facial Nerve Location[de-identified]  Bilateral facial nerve    Patient tolerance of procedure:   Tolerated well, no immediate complications   Comments        5 units orbicularis oculi bilaterally  20 units frontalis  15 units temporalis  All medically necessary

## 2021-02-16 ENCOUNTER — LAB (OUTPATIENT)
Dept: LAB | Age: 31
End: 2021-02-16
Payer: COMMERCIAL

## 2021-02-16 DIAGNOSIS — E03.9 ACQUIRED HYPOTHYROIDISM: ICD-10-CM

## 2021-02-16 DIAGNOSIS — G43.701 CHRONIC MIGRAINE WITHOUT AURA WITH STATUS MIGRAINOSUS, NOT INTRACTABLE: Primary | ICD-10-CM

## 2021-02-16 DIAGNOSIS — M79.18 CERVICAL MYOFASCIAL PAIN SYNDROME: ICD-10-CM

## 2021-02-16 LAB
25(OH)D3 SERPL-MCNC: 36.1 NG/ML (ref 30–100)
TSH SERPL DL<=0.05 MIU/L-ACNC: 3.33 UIU/ML (ref 0.36–3.74)

## 2021-02-16 PROCEDURE — 82306 VITAMIN D 25 HYDROXY: CPT

## 2021-02-16 PROCEDURE — 36415 COLL VENOUS BLD VENIPUNCTURE: CPT

## 2021-02-16 PROCEDURE — 84443 ASSAY THYROID STIM HORMONE: CPT

## 2021-02-16 NOTE — RESULT ENCOUNTER NOTE
Spoke with patient  Will increase her thyroid supplement to daily except for taking 2 tablets together 1 day a week  This is because patient is very symptomatic in a hypothyroid away and her TSH is high normal   I will check TSH in 6-8 weeks

## 2021-02-18 ENCOUNTER — TELEPHONE (OUTPATIENT)
Dept: NEUROLOGY | Facility: CLINIC | Age: 31
End: 2021-02-18

## 2021-02-18 DIAGNOSIS — E03.9 ACQUIRED HYPOTHYROIDISM: ICD-10-CM

## 2021-02-18 DIAGNOSIS — M79.18 CERVICAL MYOFASCIAL PAIN SYNDROME: Primary | ICD-10-CM

## 2021-02-18 RX ORDER — DEXAMETHASONE 1 MG
1 TABLET ORAL
Qty: 5 TABLET | Refills: 0 | Status: SHIPPED | OUTPATIENT
Start: 2021-02-18 | End: 2021-10-01 | Stop reason: SDUPTHER

## 2021-02-18 RX ORDER — METHOCARBAMOL 500 MG/1
500 TABLET, FILM COATED ORAL 3 TIMES DAILY PRN
Qty: 30 TABLET | Refills: 2 | Status: SHIPPED | OUTPATIENT
Start: 2021-02-18 | End: 2022-02-04 | Stop reason: ALTCHOICE

## 2021-02-18 RX ORDER — LEVOTHYROXINE SODIUM 0.1 MG/1
TABLET ORAL
Qty: 90 TABLET | Refills: 3 | Status: SHIPPED | OUTPATIENT
Start: 2021-02-18 | End: 2022-03-25

## 2021-03-12 ENCOUNTER — OFFICE VISIT (OUTPATIENT)
Dept: RHEUMATOLOGY | Facility: CLINIC | Age: 31
End: 2021-03-12
Payer: COMMERCIAL

## 2021-03-12 ENCOUNTER — APPOINTMENT (OUTPATIENT)
Dept: LAB | Facility: MEDICAL CENTER | Age: 31
End: 2021-03-12
Payer: COMMERCIAL

## 2021-03-12 VITALS
BODY MASS INDEX: 24.55 KG/M2 | WEIGHT: 130 LBS | HEART RATE: 92 BPM | SYSTOLIC BLOOD PRESSURE: 127 MMHG | HEIGHT: 61 IN | TEMPERATURE: 98.2 F | DIASTOLIC BLOOD PRESSURE: 91 MMHG

## 2021-03-12 DIAGNOSIS — M35.9 UNDIFFERENTIATED CONNECTIVE TISSUE DISEASE (HCC): ICD-10-CM

## 2021-03-12 DIAGNOSIS — I73.00 RAYNAUD'S DISEASE WITHOUT GANGRENE: ICD-10-CM

## 2021-03-12 DIAGNOSIS — M32.19 SYSTEMIC LUPUS ERYTHEMATOSUS WITH OTHER ORGAN INVOLVEMENT, UNSPECIFIED SLE TYPE (HCC): Primary | ICD-10-CM

## 2021-03-12 DIAGNOSIS — Z79.899 HIGH RISK MEDICATION USE: ICD-10-CM

## 2021-03-12 DIAGNOSIS — M25.50 ARTHRALGIA, UNSPECIFIED JOINT: ICD-10-CM

## 2021-03-12 LAB
ALBUMIN SERPL BCP-MCNC: 4.3 G/DL (ref 3.5–5)
ALP SERPL-CCNC: 55 U/L (ref 46–116)
ALT SERPL W P-5'-P-CCNC: 21 U/L (ref 12–78)
ANION GAP SERPL CALCULATED.3IONS-SCNC: 7 MMOL/L (ref 4–13)
AST SERPL W P-5'-P-CCNC: 8 U/L (ref 5–45)
BACTERIA UR QL AUTO: NORMAL /HPF
BASOPHILS # BLD AUTO: 0.09 THOUSANDS/ΜL (ref 0–0.1)
BASOPHILS NFR BLD AUTO: 1 % (ref 0–1)
BILIRUB SERPL-MCNC: 0.37 MG/DL (ref 0.2–1)
BILIRUB UR QL STRIP: NEGATIVE
BUN SERPL-MCNC: 14 MG/DL (ref 5–25)
C3 SERPL-MCNC: 102 MG/DL (ref 90–180)
C4 SERPL-MCNC: 26 MG/DL (ref 10–40)
CALCIUM SERPL-MCNC: 9 MG/DL (ref 8.3–10.1)
CHLORIDE SERPL-SCNC: 112 MMOL/L (ref 100–108)
CLARITY UR: CLEAR
CO2 SERPL-SCNC: 23 MMOL/L (ref 21–32)
COLOR UR: YELLOW
CREAT SERPL-MCNC: 1.06 MG/DL (ref 0.6–1.3)
CREAT UR-MCNC: 155 MG/DL
CRP SERPL QL: <3 MG/L
EOSINOPHIL # BLD AUTO: 0.23 THOUSAND/ΜL (ref 0–0.61)
EOSINOPHIL NFR BLD AUTO: 4 % (ref 0–6)
ERYTHROCYTE [DISTWIDTH] IN BLOOD BY AUTOMATED COUNT: 11.2 % (ref 11.6–15.1)
ERYTHROCYTE [SEDIMENTATION RATE] IN BLOOD: 1 MM/HOUR (ref 0–19)
GFR SERPL CREATININE-BSD FRML MDRD: 70 ML/MIN/1.73SQ M
GLUCOSE SERPL-MCNC: 86 MG/DL (ref 65–140)
GLUCOSE UR STRIP-MCNC: NEGATIVE MG/DL
HCT VFR BLD AUTO: 40.4 % (ref 34.8–46.1)
HGB BLD-MCNC: 14.4 G/DL (ref 11.5–15.4)
HGB UR QL STRIP.AUTO: NEGATIVE
HYALINE CASTS #/AREA URNS LPF: NORMAL /LPF
IMM GRANULOCYTES # BLD AUTO: 0.02 THOUSAND/UL (ref 0–0.2)
IMM GRANULOCYTES NFR BLD AUTO: 0 % (ref 0–2)
KETONES UR STRIP-MCNC: NEGATIVE MG/DL
LEUKOCYTE ESTERASE UR QL STRIP: NEGATIVE
LYMPHOCYTES # BLD AUTO: 2.53 THOUSANDS/ΜL (ref 0.6–4.47)
LYMPHOCYTES NFR BLD AUTO: 39 % (ref 14–44)
MCH RBC QN AUTO: 31.8 PG (ref 26.8–34.3)
MCHC RBC AUTO-ENTMCNC: 35.6 G/DL (ref 31.4–37.4)
MCV RBC AUTO: 89 FL (ref 82–98)
MONOCYTES # BLD AUTO: 0.39 THOUSAND/ΜL (ref 0.17–1.22)
MONOCYTES NFR BLD AUTO: 6 % (ref 4–12)
NEUTROPHILS # BLD AUTO: 3.16 THOUSANDS/ΜL (ref 1.85–7.62)
NEUTS SEG NFR BLD AUTO: 50 % (ref 43–75)
NITRITE UR QL STRIP: NEGATIVE
NON-SQ EPI CELLS URNS QL MICRO: NORMAL /HPF
NRBC BLD AUTO-RTO: 0 /100 WBCS
PH UR STRIP.AUTO: 6.5 [PH]
PLATELET # BLD AUTO: 254 THOUSANDS/UL (ref 149–390)
PMV BLD AUTO: 12.4 FL (ref 8.9–12.7)
POTASSIUM SERPL-SCNC: 3.7 MMOL/L (ref 3.5–5.3)
PROT SERPL-MCNC: 7.5 G/DL (ref 6.4–8.2)
PROT UR STRIP-MCNC: NEGATIVE MG/DL
PROT UR-MCNC: 18 MG/DL
PROT/CREAT UR: 0.12 MG/G{CREAT} (ref 0–0.1)
RBC # BLD AUTO: 4.53 MILLION/UL (ref 3.81–5.12)
RBC #/AREA URNS AUTO: NORMAL /HPF
SODIUM SERPL-SCNC: 142 MMOL/L (ref 136–145)
SP GR UR STRIP.AUTO: 1.02 (ref 1–1.03)
UROBILINOGEN UR QL STRIP.AUTO: 0.2 E.U./DL
WBC # BLD AUTO: 6.42 THOUSAND/UL (ref 4.31–10.16)
WBC #/AREA URNS AUTO: NORMAL /HPF

## 2021-03-12 PROCEDURE — 86140 C-REACTIVE PROTEIN: CPT | Performed by: INTERNAL MEDICINE

## 2021-03-12 PROCEDURE — 84156 ASSAY OF PROTEIN URINE: CPT | Performed by: INTERNAL MEDICINE

## 2021-03-12 PROCEDURE — 81001 URINALYSIS AUTO W/SCOPE: CPT | Performed by: INTERNAL MEDICINE

## 2021-03-12 PROCEDURE — 80053 COMPREHEN METABOLIC PANEL: CPT | Performed by: INTERNAL MEDICINE

## 2021-03-12 PROCEDURE — 85025 COMPLETE CBC W/AUTO DIFF WBC: CPT | Performed by: INTERNAL MEDICINE

## 2021-03-12 PROCEDURE — 99214 OFFICE O/P EST MOD 30 MIN: CPT | Performed by: INTERNAL MEDICINE

## 2021-03-12 PROCEDURE — 82570 ASSAY OF URINE CREATININE: CPT | Performed by: INTERNAL MEDICINE

## 2021-03-12 PROCEDURE — 86038 ANTINUCLEAR ANTIBODIES: CPT | Performed by: INTERNAL MEDICINE

## 2021-03-12 PROCEDURE — 36415 COLL VENOUS BLD VENIPUNCTURE: CPT | Performed by: INTERNAL MEDICINE

## 2021-03-12 PROCEDURE — 86225 DNA ANTIBODY NATIVE: CPT | Performed by: INTERNAL MEDICINE

## 2021-03-12 PROCEDURE — 85652 RBC SED RATE AUTOMATED: CPT | Performed by: INTERNAL MEDICINE

## 2021-03-12 PROCEDURE — 86160 COMPLEMENT ANTIGEN: CPT | Performed by: INTERNAL MEDICINE

## 2021-03-12 RX ORDER — HYDROXYCHLOROQUINE SULFATE 200 MG/1
300 TABLET, FILM COATED ORAL DAILY
Qty: 135 TABLET | Refills: 1 | Status: SHIPPED | OUTPATIENT
Start: 2021-03-12 | End: 2021-06-18 | Stop reason: SDUPTHER

## 2021-03-12 RX ORDER — NAPROXEN 500 MG/1
500 TABLET ORAL 2 TIMES DAILY WITH MEALS
Qty: 180 TABLET | Refills: 1 | Status: SHIPPED | OUTPATIENT
Start: 2021-03-12 | End: 2021-04-29 | Stop reason: ALTCHOICE

## 2021-03-12 RX ORDER — FAMOTIDINE 20 MG/1
20 TABLET, FILM COATED ORAL DAILY
Qty: 90 TABLET | Refills: 1 | Status: SHIPPED | OUTPATIENT
Start: 2021-03-12 | End: 2021-06-18 | Stop reason: SDUPTHER

## 2021-03-12 NOTE — PROGRESS NOTES
Assessment and Plan: Kareen Sales is a 32 y o   female who presents for follow-up of undifferentiated connective tissue disease (photosensitivity, arthralgia, myalgia, fatigue, mouth/nasal sores, sicca symptoms, Raynaud's symptoms, headaches, and malar erythema)  She also has developed hair loss and low-grade fevers  Increased patient's HCQ to 300mg po daily  Started her on naproxen 500mg po bid prn for joint pain  She can take famotidine daily if she plans on taking naproxen daily  Her lupus activity labs ordered today returned unremarkable, but an DEBBY with IFA was also ordered this visit, and returned positive at 1:640 in a speckled pattern, so she likely does have SLE  Will see how she does with the above modifications in her medications  Plan:  Diagnoses and all orders for this visit:    Systemic lupus erythematosus with other organ involvement, unspecified SLE type ([facility] )    Undifferentiated connective tissue disease ([facility] )  -     famotidine (PEPCID) 20 mg tablet; Take 1 tablet (20 mg total) by mouth daily  -     hydroxychloroquine (PLAQUENIL) 200 mg tablet; Take 1 5 tablets (300 mg total) by mouth daily  -     naproxen (NAPROSYN) 500 mg tablet; Take 1 tablet (500 mg total) by mouth 2 (two) times a day with meals  -     CBC and differential  -     Comprehensive metabolic panel  -     C-reactive protein  -     Sedimentation rate, automated  -     Protein / creatinine ratio, urine  -     Urinalysis with microscopic  -     C4 complement  -     C3 complement  -     Anti-DNA antibody, double-stranded  -     MISCELLANEOUS LAB TEST    Raynaud's disease without gangrene    Arthralgia, unspecified joint    High risk medication use - Benefits and risks of hydroxychloroquine, including but not limited to retinal toxicity, corneal deposits, gastrointestinal side effects, and headaches were previously discussed with the patient   She is aware of the need for a regular eye exam to monitor for ocular toxicity while on this medication  Follow-up plan: Return to clinic in 3 months        Rheumatic Disease Summary  Maria Isabel Espinosasteve Magana is a 32 y o  female who originally presented on 8/28/20 as a Rheumatology consult referred by her PCP Lesly Beth PA-C for evaluation of possible autoimmune disease  Patient admitted to fevers, photosensitivity, arthralgia, myalgia, fatigue, mouth/nasal sores, sicca symptoms, Raynaud's symptoms, headaches, and malar erythema  Extensive autoimmune disease lab and x-ray work-up ordered and returned unremarkable  Prescribed celecoxib 100mg po bid prn joint pain  Can also prescribe prednisone course to see if symptoms improve; if they do, then there likely is an inflammatory process going on  Next clinic visit was 10/9/20  It was felt that she had UCTD at the time  Also on differential was seronegative RA with sicca symptoms  Muscle enzymes and ACE level ordered to evaluate for myositis and sarcoidosis returned normal  Started patient on the DMARD hydroxychloroquine 200mg po daily; she was made aware to get regular eye exams while on this medication  She was to continue celecoxib 200mg po bid prn for joint pain  Put patient on a slower prednisone taper starting at 20mg po daily and to decrease by 2 5mg increments every week until off  HPI  Aldo Goodell is a 32 y o   female who presents for follow-up of UCTD  Next clinic visit was 10/9/20  Patient has been struggling with severe fatigue, continued joint pain in wrists, fingers, elbows, but now in knees too, muscle pain, heartburn, stabbing chest pains, feeling weak, face redness, nose sores that are painful, mouth sores; also has been losing hair, eyelashes falling out, and admits to night sweats  Her hips, back, and neck hurt as well  She admits to still having  low-grade fevers  For the past month, feels like she has been in a flare; fatigue and joint pain is the most bothersome  Celecoxib hasn't been helping, stopped it a few months ago  Cyclobenzaprine isn't helping anymore either  Had a recent eye exam a month ago, which was normal      The following portions of the patient's history were reviewed and updated as appropriate: allergies, current medications, past family history, past medical history, past social history, past surgical history and problem list     Review of Systems:   Review of Systems   Constitutional: Positive for fatigue and fever  Negative for chills and unexpected weight change  HENT: Positive for mouth sores and sore throat  Negative for trouble swallowing  Dry mouth   Eyes: Negative for pain and visual disturbance  Dry eyes   Respiratory: Positive for shortness of breath  Negative for cough  Cardiovascular: Positive for chest pain  Negative for leg swelling  Gastrointestinal: Positive for abdominal pain  Negative for constipation and diarrhea  Indigestion/heartburn   Genitourinary: Positive for frequency  Musculoskeletal: Positive for arthralgias, back pain, joint swelling, myalgias and neck pain  Skin: Positive for color change and rash  Neurological: Positive for dizziness, numbness and headaches  Negative for weakness  Hematological: Negative for adenopathy  Bruises/bleeds easily  Psychiatric/Behavioral: Negative for sleep disturbance         Home Medications:    Current Outpatient Medications:     amphetamine-dextroamphetamine (ADDERALL) 20 mg tablet, Take 20 mg by mouth daily , Disp: , Rfl: 0    buPROPion (WELLBUTRIN SR) 150 mg 12 hr tablet, Take 150 mg by mouth 2 (two) times a day , Disp: , Rfl:     celecoxib (CeleBREX) 200 mg capsule, Take 1 capsule (200 mg total) by mouth 2 (two) times a day (Patient taking differently: Take 200 mg by mouth as needed ), Disp: 180 capsule, Rfl: 1    cyclobenzaprine (FLEXERIL) 5 mg tablet, Take 2 tablets (10 mg total) by mouth daily at bedtime (Patient taking differently: Take 10 mg by mouth as needed ), Disp: 180 tablet, Rfl: 3    dexamethasone (DECADRON) 1 mg tablet, Take 1 tablet (1 mg total) by mouth daily with breakfast, Disp: 5 tablet, Rfl: 0    dexamethasone (DECADRON) 1 mg tablet, Take 1 tablet (1 mg total) by mouth daily with breakfast, Disp: 5 tablet, Rfl: 0    diazepam (VALIUM) 5 mg tablet, Take 5 mg by mouth every 6 (six) hours as needed , Disp: , Rfl:     fexofenadine (ALLEGRA) 180 MG tablet, Take 180 mg by mouth daily, Disp: , Rfl:     FLUoxetine (PROzac) 10 mg capsule, Take 10 mg by mouth daily , Disp: , Rfl:     hydroxychloroquine (PLAQUENIL) 200 mg tablet, Take 1 5 tablets (300 mg total) by mouth daily, Disp: 135 tablet, Rfl: 1    Ibuprofen 200 MG CAPS, Take 800 mg by mouth every 6 (six) hours as needed, Disp: , Rfl:     lamoTRIgine (LaMICtal) 100 mg tablet, Take 150 mg by mouth daily , Disp: , Rfl:     levonorgestrel (KYLEENA) 19 5 MG intrauterine device, 1 Intra Uterine Device by Intrauterine route once, Disp: , Rfl:     levothyroxine 100 mcg tablet, TAKE ONE TABLET BY MOUTH ONCE DAILY, Disp: 90 tablet, Rfl: 3    methocarbamol (ROBAXIN) 500 mg tablet, Take 1 tablet (500 mg total) by mouth 3 (three) times a day as needed for muscle spasms, Disp: 30 tablet, Rfl: 2    mupirocin (BACTROBAN) 2 % ointment, Apply topically 3 (three) times a day, Disp: 30 g, Rfl: 5    nitrofurantoin (MACROBID) 100 mg capsule, Take 1 capsule (100 mg total) by mouth 2 (two) times a day, Disp: 7 capsule, Rfl: 0    nitrofurantoin (MACRODANTIN) 50 mg capsule, Take 1 capsule (50 mg total) by mouth daily at bedtime as needed (after intercourse), Disp: 14 capsule, Rfl: 1    OLANZapine-FLUoxetine (SYMBYAX) 3-25 MG per capsule, Take 1 capsule by mouth every evening, Disp: , Rfl:     polyethylene glycol (MIRALAX) 17 g packet, Take 17 g by mouth daily, Disp: , Rfl:     predniSONE 2 5 mg tablet, Combine to take 12 5 mg once daily  , Disp: 30 tablet, Rfl: 1    predniSONE 5 mg tablet, Take the taper as directed , Disp: 60 tablet, Rfl: 1    prochlorperazine (COMPAZINE) 10 mg tablet, Take 1 tablet (10 mg total) by mouth every 6 (six) hours as needed (migraine), Disp: 10 tablet, Rfl: 0    Rimegepant Sulfate (Nurtec) 75 MG TBDP, Take 75 mg by mouth as needed (migraine), Disp: 8 tablet, Rfl: 3    SUMAtriptan (IMITREX) 100 mg tablet, TAKE ONE TABLET BY MOUTH ONCE AS NEEDED FOR MIGRAINE FOR UP TO 1 DOSE  MAX 2 TABS/24 HOURS  MAX 9 TABS/MONTH , Disp: 27 tablet, Rfl: 0    topiramate (TOPAMAX) 50 MG tablet, 1 tab in the am and 2 tabs at bedtime, Disp: 270 tablet, Rfl: 3    famotidine (PEPCID) 20 mg tablet, Take 1 tablet (20 mg total) by mouth daily, Disp: 90 tablet, Rfl: 1    naproxen (NAPROSYN) 500 mg tablet, Take 1 tablet (500 mg total) by mouth 2 (two) times a day with meals, Disp: 180 tablet, Rfl: 1    Objective:    Vitals:    03/12/21 1453   BP: 127/91   BP Location: Left arm   Patient Position: Sitting   Cuff Size: Standard   Pulse: 92   Temp: 98 2 °F (36 8 °C)   TempSrc: Temporal   Weight: 59 kg (130 lb)   Height: 5' 1" (1 549 m)       Physical Exam  Constitutional:       General: She is not in acute distress  Appearance: She is well-developed  HENT:      Head: Normocephalic and atraumatic  Eyes:      General: Lids are normal  No scleral icterus  Conjunctiva/sclera: Conjunctivae normal    Neck:      Musculoskeletal: Neck supple  No muscular tenderness  Cardiovascular:      Rate and Rhythm: Normal rate and regular rhythm  Heart sounds: S1 normal and S2 normal  No murmur  No friction rub  Pulmonary:      Effort: Pulmonary effort is normal  No tachypnea or respiratory distress  Breath sounds: Normal breath sounds  No wheezing, rhonchi or rales  Musculoskeletal:         General: Tenderness present  Comments: Bilateral wrist, elbow, and ankle tenderness   Skin:     General: Skin is warm and dry  Findings: Erythema present  No rash  Nails: There is no clubbing          Comments: Malar erythema   Neurological:      Mental Status: She is alert  Sensory: No sensory deficit  Psychiatric:         Behavior: Behavior normal  Behavior is cooperative  Reviewed labs and imaging      Imaging:   Bilateral Hand and SI joint x-rays 8/28/20: unremarkable    Labs:   Office Visit on 03/12/2021   Component Date Value Ref Range Status    WBC 03/12/2021 6 42  4 31 - 10 16 Thousand/uL Final    RBC 03/12/2021 4 53  3 81 - 5 12 Million/uL Final    Hemoglobin 03/12/2021 14 4  11 5 - 15 4 g/dL Final    Hematocrit 03/12/2021 40 4  34 8 - 46 1 % Final    MCV 03/12/2021 89  82 - 98 fL Final    MCH 03/12/2021 31 8  26 8 - 34 3 pg Final    MCHC 03/12/2021 35 6  31 4 - 37 4 g/dL Final    RDW 03/12/2021 11 2* 11 6 - 15 1 % Final    MPV 03/12/2021 12 4  8 9 - 12 7 fL Final    Platelets 57/98/1940 254  149 - 390 Thousands/uL Final    nRBC 03/12/2021 0  /100 WBCs Final    Neutrophils Relative 03/12/2021 50  43 - 75 % Final    Immat GRANS % 03/12/2021 0  0 - 2 % Final    Lymphocytes Relative 03/12/2021 39  14 - 44 % Final    Monocytes Relative 03/12/2021 6  4 - 12 % Final    Eosinophils Relative 03/12/2021 4  0 - 6 % Final    Basophils Relative 03/12/2021 1  0 - 1 % Final    Neutrophils Absolute 03/12/2021 3 16  1 85 - 7 62 Thousands/µL Final    Immature Grans Absolute 03/12/2021 0 02  0 00 - 0 20 Thousand/uL Final    Lymphocytes Absolute 03/12/2021 2 53  0 60 - 4 47 Thousands/µL Final    Monocytes Absolute 03/12/2021 0 39  0 17 - 1 22 Thousand/µL Final    Eosinophils Absolute 03/12/2021 0 23  0 00 - 0 61 Thousand/µL Final    Basophils Absolute 03/12/2021 0 09  0 00 - 0 10 Thousands/µL Final    Sodium 03/12/2021 142  136 - 145 mmol/L Final    Potassium 03/12/2021 3 7  3 5 - 5 3 mmol/L Final    Chloride 03/12/2021 112* 100 - 108 mmol/L Final    CO2 03/12/2021 23  21 - 32 mmol/L Final    ANION GAP 03/12/2021 7  4 - 13 mmol/L Final    BUN 03/12/2021 14  5 - 25 mg/dL Final    Creatinine 03/12/2021 1 06  0 60 - 1 30 mg/dL Final Standardized to IDMS reference method    Glucose 03/12/2021 86  65 - 140 mg/dL Final    If the patient is fasting, the ADA then defines impaired fasting glucose as > 100 mg/dL and diabetes as > or equal to 123 mg/dL  Specimen collection should occur prior to Sulfasalazine administration due to the potential for falsely depressed results  Specimen collection should occur prior to Sulfapyridine administration due to the potential for falsely elevated results   Calcium 03/12/2021 9 0  8 3 - 10 1 mg/dL Final    AST 03/12/2021 8  5 - 45 U/L Final    Specimen collection should occur prior to Sulfasalazine administration due to the potential for falsely depressed results   ALT 03/12/2021 21  12 - 78 U/L Final    Specimen collection should occur prior to Sulfasalazine and/or Sulfapyridine administration due to the potential for falsely depressed results   Alkaline Phosphatase 03/12/2021 55  46 - 116 U/L Final    Total Protein 03/12/2021 7 5  6 4 - 8 2 g/dL Final    Albumin 03/12/2021 4 3  3 5 - 5 0 g/dL Final    Total Bilirubin 03/12/2021 0 37  0 20 - 1 00 mg/dL Final    Use of this assay is not recommended for patients undergoing treatment with eltrombopag due to the potential for falsely elevated results      eGFR 03/12/2021 70  ml/min/1 73sq m Final    CRP 03/12/2021 <3 0  <3 0 mg/L Final    Sed Rate 03/12/2021 1  0 - 19 mm/hour Final    Creatinine, Ur 03/12/2021 155 0  mg/dL Final    Protein Urine Random 03/12/2021 18  mg/dL Final    Prot/Creat Ratio, Ur 03/12/2021 0 12* 0 00 - 0 10 Final    Clarity, UA 03/12/2021 Clear   Final    Color, UA 03/12/2021 Yellow   Final    Specific Gravity, UA 03/12/2021 1 020  1 003 - 1 030 Final    pH, UA 03/12/2021 6 5  4 5, 5 0, 5 5, 6 0, 6 5, 7 0, 7 5, 8 0 Final    Glucose, UA 03/12/2021 Negative  Negative mg/dl Final    Ketones, UA 03/12/2021 Negative  Negative mg/dl Final    Blood, UA 03/12/2021 Negative  Negative Final    Protein, UA 03/12/2021 Negative Negative mg/dl Final    Nitrite, UA 03/12/2021 Negative  Negative Final    Bilirubin, UA 03/12/2021 Negative  Negative Final    Urobilinogen, UA 03/12/2021 0 2  0 2, 1 0 E U /dl E U /dl Final    Leukocytes, UA 03/12/2021 Negative  Negative Final    WBC, UA 03/12/2021 None Seen  None Seen, 2-4 /hpf Final    RBC, UA 03/12/2021 None Seen  None Seen, 2-4 /hpf Final    Hyaline Casts, UA 03/12/2021 None Seen  None Seen /lpf Final    Bacteria, UA 03/12/2021 None Seen  None Seen, Occasional /hpf Final    Epithelial Cells 03/12/2021 None Seen  None Seen, Occasional /hpf Final    C4, COMPLEMENT 03/12/2021 26 0  10 0 - 40 0 mg/dL Final    C3 Complement 03/12/2021 102 0  90 0 - 180 0 mg/dL Final    ds DNA Ab 03/12/2021 1  0 - 9 IU/mL Final                                       Negative      <5                                     Equivocal  5 - 9                                     Positive      >9    Miscellaneous Lab Test Result 03/12/2021 SEE WRITTEN REPORT   Final   Lab on 02/16/2021   Component Date Value Ref Range Status    TSH 3RD GENERATON 02/16/2021 3 330  0 358 - 3 740 uIU/mL Final    The recommended reference ranges for TSH during pregnancy are as follows:   First trimester 0 1 to 2 5 uIU/mL   Second trimester  0 2 to 3 0 uIU/mL   Third trimester 0 3 to 3 0 uIU/m    Note: Normal ranges may not apply to patients who are transgender, non-binary, or whose legal sex, sex at birth, and gender identity differ      Vit D, 25-Hydroxy 02/16/2021 36 1  30 0 - 100 0 ng/mL Final   Annual Exam on 11/03/2020   Component Date Value Ref Range Status    Case Report 11/03/2020    Final                    Value:Gynecologic Cytology Report                       Case: [id number]                                  Authorizing Provider:  Janie Reeves MD  Collected:           11/03/2020 0927              Ordering Location:     [facility] &     Received:            11/03/2020 [id number] [facility]                                                                First Screen:          Laura Mcdermott, [place]                                                    Specimen:    LIQUID-BASED PAP, SCREENING, Cervix, Endocervical                                          Primary Interpretation 11/03/2020 Negative for intraepithelial lesion or malignancy   Final    Specimen Adequacy 11/03/2020 Satisfactory for evaluation  Absence of endocervical/transformation zone component  Final    Additional Information 11/03/2020    Final                    Value: This result contains rich text formatting which cannot be displayed here   LMP 11/03/2020 10/30/2020   Final    HPV Other HR 11/03/2020 Negative  Negative Final    HPV types: 36,15,88,83,47,74,95,71,70,25,23 and 68 DNA are undetectable or below the pre-set threshold      HPV16 11/03/2020 Negative  Negative Final    HPV18 11/03/2020 Negative  Negative Final   Office Visit on 10/28/2020   Component Date Value Ref Range Status    LEUKOCYTE ESTERASE,UA 10/28/2020 trace   Final    BLOOD,UA 10/28/2020 trace   Final     COLOR,UA 10/28/2020 yellow   Final    CLARITY,UA 10/28/2020 cloudy   Final    Clarity, UA 10/28/2020 Clear   Final    Color, UA 10/28/2020 Yellow   Final    Specific [place], UA 10/28/2020 1 023  1 003 - 1 030 Final    pH, UA 10/28/2020 6 0  4 5, 5 0, 5 5, 6 0, 6 5, 7 0, 7 5, 8 0 Final    Glucose, UA 10/28/2020 Negative  Negative mg/dl Final    Ketones, UA 10/28/2020 Negative  Negative mg/dl Final    Blood, UA 10/28/2020 Negative  Negative Final    Protein, UA 10/28/2020 Negative  Negative mg/dl Final    Nitrite, UA 10/28/2020 Negative  Negative Final    Bilirubin, UA 10/28/2020 Negative  Negative Final    Urobilinogen, UA 10/28/2020 0 2  0 2, 1 0 E U /dl E U /dl Final    Leukocytes, UA 10/28/2020 Negative  Negative Final  WBC, UA 10/28/2020 2-4  None Seen, 2-4 /hpf Final    RBC, UA 10/28/2020 4-10* None Seen, 2-4 /hpf Final    Hyaline Casts, UA 10/28/2020 5-10* None Seen /lpf Final    Bacteria, UA 10/28/2020 Occasional  None Seen, Occasional /hpf Final    Epithelial Cells 10/28/2020 None Seen  None Seen, Occasional /hpf Final    Urine Culture 10/28/2020 <10,000 cfu/ml    Final    Mixed Contaminants X2   Office Visit on 10/09/2020   Component Date Value Ref Range Status    Total CK 10/09/2020 129  26 - 192 U/L Final    Aldolase 10/09/2020 4 2  3 3 - 10 3 U/L Final    Angio Convert Enzyme 10/09/2020 24  14 - 82 U/L Final   Appointment on 08/28/2020   Component Date Value Ref Range Status    DEBBY 08/28/2020 Negative  Negative Final   Office Visit on 08/28/2020   Component Date Value Ref Range Status    Sed Rate 08/28/2020 5  0 - 19 mm/hour Final    CRP 08/28/2020 <3 0  <3 0 mg/L Final    Anti-Centromere B Antibodies 08/28/2020 <0 2  0 0 - 0 9 AI Final    Cyclic Citrullin Peptide Ab 08/28/2020 7  0 - 19 units Final                              Negative               <20                            Weak positive      20 - 39                            Moderate positive  40 - 59                            Strong positive        >59    HLA B27 08/28/2020 Negative   Final    HLA-B*27 Negative  B27 allele interpretation for all loci based on IMGT/HLA  database version 3 38  This test was developed and its performance characteristics  determined by [facility]   It has not been cleared or approved  by the Food and Drug Administration  [facility] CLIA ID Number [id number]  This test was performed using PCR (Polymerase Chain Reaction)/SSOP  (Sequence Specific Oligonucleotide Probes) technique  SBT (Sequence  Based Typing) and/or SSP (Sequence Specific Primers) may be used as  supplemental methods when necessary  Please contact [facility] Customer  Service at [phone] if you have any questions     Director of [facility]   Dr Ulysses Andes Ya Turpin, PhD    Ferritin 08/28/2020 40  8 - 388 ng/mL Final    ds DNA Ab 08/28/2020 1  0 - 9 IU/mL Final                                       Negative      <5                                     Equivocal  5 - 9                                     Positive      >9   Orders Only on 07/14/2020   Component Date Value Ref Range Status    SARS-CoV-2  07/14/2020 Not Detected  Not Detected Final    This test was developed and its performance characteristics determined  by [facility]  This test has not been FDA cleared or  approved  This test has been authorized by FDA under an Emergency Use  Authorization (EUA)  This test is only authorized for the duration of  time the declaration that circumstances exist justifying the  authorization of the emergency use of in vitro diagnostic tests for  detection of SARS-CoV-2 virus and/or diagnosis of COVID-19 infection  under section 564(b)(1) of the Act, 21 U  S C  933YAS-1(T)(0), unless  the authorization is terminated or revoked sooner  When diagnostic testing is negative, the possibility of a false  negative result should be considered in the context of a patient's  recent exposures and the presence of clinical signs and symptoms  consistent with COVID-19  An individual without symptoms of COVID-19  and who is not shedding SARS-CoV-2 virus would expect to have a  negative (not detected) result in this assay   Inpatient 07/14/2020 Comment   Final    Received   Appointment on 05/09/2020   Component Date Value Ref Range Status    Sed Rate 05/09/2020 4  0 - 20 mm/hour Final    CRP 05/09/2020 <3 0  <3 0 mg/L Final    Miscellaneous Lab Test Result 05/09/2020 SEE WRITTEN REPORT   Final    SS-A (RO) Ab 05/09/2020 <0 2  0 0 - 0 9 AI Final    SS-B (LA) Ab 05/09/2020 <0 2  0 0 - 0 9 AI Final    Uric Acid 05/09/2020 2 9  2 0 - 6 8 mg/dL Final    Specimen collection should occur prior to Metamizole administration due to the potential for falsely depressed results  Telemedicine on 05/08/2020   Component Date Value Ref Range Status    Sodium 05/09/2020 138  136 - 145 mmol/L Final    Potassium 05/09/2020 3 5  3 5 - 5 3 mmol/L Final    Chloride 05/09/2020 104  100 - 108 mmol/L Final    CO2 05/09/2020 25  21 - 32 mmol/L Final    ANION GAP 05/09/2020 9  4 - 13 mmol/L Final    BUN 05/09/2020 10  5 - 25 mg/dL Final    Creatinine 05/09/2020 0 99  0 60 - 1 30 mg/dL Final    Standardized to IDMS reference method    Glucose, Fasting 05/09/2020 92  65 - 99 mg/dL Final      Specimen collection should occur prior to Sulfasalazine administration due to the potential for falsely depressed results  Specimen collection should occur prior to Sulfapyridine administration due to the potential for falsely elevated results   Calcium 05/09/2020 8 6  8 3 - 10 1 mg/dL Final    AST 05/09/2020 13  5 - 45 U/L Final      Specimen collection should occur prior to Sulfasalazine administration due to the potential for falsely depressed results   ALT 05/09/2020 31  12 - 78 U/L Final      Specimen collection should occur prior to Sulfasalazine administration due to the potential for falsely depressed results   Alkaline Phosphatase 05/09/2020 52  46 - 116 U/L Final    Total Protein 05/09/2020 7 2  6 4 - 8 2 g/dL Final    Albumin 05/09/2020 3 8  3 5 - 5 0 g/dL Final    Total Bilirubin 05/09/2020 0 23  0 20 - 1 00 mg/dL Final      Use of this assay is not recommended for patients undergoing treatment with eltrombopag due to the potential for falsely elevated results   eGFR 05/09/2020 77  ml/min/1 73sq m Final    Hemoglobin A1C 05/09/2020 5 0  Normal 3 8-5 6%; PreDiabetic 5 7-6 4%;  Diabetic >=6 5%; Glycemic control for adults with diabetes <7 0% % Final    EAG 05/09/2020 97  mg/dl Final    WBC 05/09/2020 4 60  4 31 - 10 16 Thousand/uL Final    RBC 05/09/2020 4 32  3 81 - 5 12 Million/uL Final    Hemoglobin 05/09/2020 13 4  11 5 - 15 4 g/dL Final    Hematocrit 05/09/2020 39 4  34 8 - 46 1 % Final    MCV 05/09/2020 91  82 - 98 fL Final    MCH 05/09/2020 31 0  26 8 - 34 3 pg Final    MCHC 05/09/2020 34 0  31 4 - 37 4 g/dL Final    RDW 05/09/2020 11 5* 11 6 - 15 1 % Final    MPV 05/09/2020 10 9  8 9 - 12 7 fL Final    Platelets 60/18/5593 233  149 - 390 Thousands/uL Final    nRBC 05/09/2020 0  /100 WBCs Final    Neutrophils Relative 05/09/2020 51  43 - 75 % Final    Immat GRANS % 05/09/2020 0  0 - 2 % Final    Lymphocytes Relative 05/09/2020 38  14 - 44 % Final    Monocytes Relative 05/09/2020 6  4 - 12 % Final    Eosinophils Relative 05/09/2020 4  0 - 6 % Final    Basophils Relative 05/09/2020 1  0 - 1 % Final    Neutrophils Absolute 05/09/2020 2 35  1 85 - 7 62 Thousands/µL Final    Immature Grans Absolute 05/09/2020 0 02  0 00 - 0 20 Thousand/uL Final    Lymphocytes Absolute 05/09/2020 1 76  0 60 - 4 47 Thousands/µL Final    Monocytes Absolute 05/09/2020 0 27  0 17 - 1 22 Thousand/µL Final    Eosinophils Absolute 05/09/2020 0 16  0 00 - 0 61 Thousand/µL Final    Basophils Absolute 05/09/2020 0 04  0 00 - 0 10 Thousands/µL Final    TSH 3RD GENERATON 05/09/2020 3 225  0 358 - 3 740 uIU/mL Final      The recommended reference ranges for TSH during pregnancy are as follows:   First trimester 0 1 to 2 5 uIU/mL   Second trimester  0 2 to 3 0 uIU/mL   Third trimester 0 3 to 3 0 uIU/m    Note: Normal ranges may not apply to patients who are transgender, non-binary, or whose legal sex, sex at birth, and gender identity differ  Using supplements with high doses of biotin 20 to more than 300 times greater than the adequate daily intake for adults of 30 mcg/day as established by the [employer], can cause falsely depress results      Lyme IgG/IgM Ab 05/09/2020 <0 91  0 00 - 0 90 ISR Final                                    Negative         <0 91                                  Equivocal  0 91 - 1 09                                  Positive         >1 09    Cholesterol 05/09/2020 209* 50 - 200 mg/dL Final      Cholesterol:       Desirable         <200 mg/dl       Borderline         200-239 mg/dl       High              >239           Triglycerides 05/09/2020 53  <=150 mg/dL Final      Triglyceride:     Normal          <150 mg/dl     Borderline High 150-199 mg/dl     High            200-499 mg/dl        Very High       >499 mg/dl    Specimen collection should occur prior to N-Acetylcysteine or Metamizole administration due to the potential for falsely depressed results   HDL, Direct 05/09/2020 67  >=40 mg/dL Final      HDL Cholesterol:       Low     <41 mg/dL  Specimen collection should occur prior to Metamizole administration due to the potential for falsley depressed results   LDL Calculated 05/09/2020 131* 0 - 100 mg/dL Final      LDL Cholesterol:     Optimal           <100 mg/dl     Near Optimal      100-129 mg/dl     Above Optimal       Borderline High 130-159 mg/dl       High            160-189 mg/dl       Very High       >189 mg/dl         This screening LDL is a calculated result  It does not have the accuracy of the Direct Measured LDL in the monitoring of patients with hyperlipidemia and/or statin therapy  Direct Measure LDL (EUW662) must be ordered separately in these patients   Non-HDL-Chol (CHOL-HDL) 05/09/2020 142  mg/dl Final    DEBBY 05/09/2020 Negative  Negative Final    Rheumatoid Factor 05/09/2020 Negative  Negative Final    Vit D, 25-Hydroxy 05/09/2020 33 3  30 0 - 100 0 ng/mL Final    Iron Saturation 05/09/2020 39  % Final    TIBC 05/09/2020 272  250 - 450 ug/dL Final    Iron 05/09/2020 107  50 - 170 ug/dL Final      Patients treated with metal-binding drugs (ie  Deferoxamine) may have depressed iron values   Ferritin 05/09/2020 48  8 - 388 ng/mL Final   There may be more visits with results that are not included

## 2021-03-12 NOTE — PATIENT INSTRUCTIONS
Increase hydroxychloroquine to one and a half tabs daily  Start naproxen twice a day with meals as needed for joint pain  Can take famotidine daily if plan on taking naproxen daily  Do labs    Return to clinic in 3 months    Connective Tissue Disorders   AMBULATORY CARE:   A connective tissue disorder  can affect any connective tissue in your body  Connective tissues support your organs, attach muscles to bones, and create scar tissue after an injury  Cartilage is an example of connective tissue  There are many types of connective tissue disorders, such as rheumatoid arthritis, lupus, and scleroderma  The most common affected areas are joints, muscles, and skin  Your organs, eyes, nervous system, and blood vessels can also be affected  Common signs and symptoms of a connective tissue disorder:  Signs and symptoms depend on the type of connective tissue disorder and if it is severe  Symptoms may be mild or severe, and may come and go:  · Fever or fatigue    · Skin rash or thickening, blisters, or sensitivity to sunlight    · Rash on your cheeks that goes across your nose    · Joint pain, swelling, or warmth    · Deformed joints, or limited range of motion    · Cold, numb, or swollen fingers    · Loss of appetite, or weight loss without trying    · Dry mouth or eyes, vision problems, or an eye infection such as conjunctivitis    · Hair loss    Call 911 for any of the following:   · You are sweating, and your lips are pale or blue  · You have trouble breathing, chest pain or pressure, or a fast heartbeat  · You are vomiting blood  · You have a high fever  Seek care immediately if:   · You lose feeling in your hands or feet  · You lose feeling on one side of your body  · You have sudden pain in your eyes and vision problems      Contact your healthcare provider if:   · You have trouble urinating, or you urinate less than usual     · You have trouble having a bowel movement, or you lose control of your bowel movements  · Your muscle or joint tightness worsens, or your fingers begin to curl  · Your symptoms get worse, even after treatment  · Your skin is itchy, swollen, or has a rash  · You have questions or concerns about your condition or care  Treatment  may include any of the following:  · Medicines  may be given to prevent your immune system from attacking healthy cells  You may also need medicines to stop the disease from getting worse  You may need to use topical creams or lotions to control a rash or other symptoms that affect your skin  · Prescription pain medicine  may be given  Ask your healthcare provider how to take this medicine safely  Some prescription pain medicines contain acetaminophen  Do not take other medicines that contain acetaminophen without talking to your healthcare provider  Too much acetaminophen may cause liver damage  Prescription pain medicine may cause constipation  Ask your healthcare provider how to prevent or treat constipation  · NSAIDs , such as ibuprofen, help decrease swelling, pain, and fever  This medicine is available with or without a doctor's order  NSAIDs can cause stomach bleeding or kidney problems in certain people  If you take blood thinner medicine, always ask your healthcare provider if NSAIDs are safe for you  Always read the medicine label and follow directions  · Acetaminophen  helps reduce pain and fever  Acetaminophen is available without a doctor's order  Ask how much to take and how often to take it  Follow directions  Acetaminophen can cause liver problems if not taken correctly  · Steroids  may be given to reduce swelling and pain  Manage your connective tissue disorder:   · Rest as needed  Talk to your healthcare provider if you are having trouble sleeping because of pain or other symptoms  Rest your joints if they are stiff or painful   Your healthcare provider may suggest support devices such as crutches or splints to help your joints rest      · Eat a variety of healthy foods  Healthy foods include fruits, vegetables, lean meats, fish, and low-fat dairy products  Work with your healthcare provider or dietitian to create healthy meal plans  · Go to physical or occupational therapy as directed  A physical therapist can help you create an exercise plan  Exercise may help increase your energy  Exercise can also help keep stiff joints flexible and increase range of motion  An occupational therapist can help you learn to do your daily activities when you have pain or swelling  · Talk to your healthcare provider about pregnancy  If you are a woman and want to get pregnant, talk to your healthcare provider  You or your baby might be at risk for complications  You may need to wait until your disease is controlled or your medications are finished before you get pregnant  You may also have trouble getting pregnant because of your disease  Your healthcare provider may be able to suggest ways to improve your ability to become pregnant  · Do not smoke  Nicotine and other chemicals in cigarettes and cigars can cause blood vessel and lung damage  Ask your healthcare provider for information if you currently smoke and need help to quit  E-cigarettes or smokeless tobacco still contain nicotine  Talk to your healthcare provider before you use these products  · Manage stress  Stress may slow healing and lead to illness  Learn ways to control stress, such as relaxation, deep breathing, or listening to music  Manage flares:  A flare means something triggered your symptoms  Stress, cold weather, and sunlight are examples of triggers  Your healthcare provider can help you create a management plan that includes what to do if you have a flare  Treat flares quickly to help prevent serious illness  · Apply ice or heat as directed  Ice helps reduce pain and swelling, and may help prevent tissue damage   Use an ice pack, or put crushed ice in a bag  Cover the bag with a towel and apply to the painful area for 15 to 20 minutes every hour, or as directed  Heat helps reduce pain and muscle spasms  Apply a warm compress to the area for 20 minutes every 2 hours, or as directed  · Elevate the area above the level of your heart  Elevation can help reduce swelling and pain, especially in your joints  Elevate the area as often as possible  · Keep your hands and feet warm  Certain connective tissue disorders can cause your hands and feet to become cold and painful  Over time, ulcers or gangrene (tissue death) may develop if frequent or severe attacks are not prevented  Dress warmly in cold weather, including gloves and thick socks  It may help to wiggle your fingers or toes to improve circulation  Follow up with your healthcare provider as directed: You may need ongoing tests or treatment  Write down your questions so you remember to ask them during your visits  © Copyright [address] Information is for End User's use only and may not be sold, redistributed or otherwise used for commercial purposes  All illustrations and images included in CareNotes® are the copyrighted property of [employer]  or [facility] Vishal Goldsmith   The above information is an  only  It is not intended as medical advice for individual conditions or treatments  Talk to your doctor, nurse or pharmacist before following any medical regimen to see if it is safe and effective for you

## 2021-03-16 LAB — DSDNA AB SER-ACNC: 1 IU/ML (ref 0–9)

## 2021-03-18 LAB — MISCELLANEOUS LAB TEST RESULT: NORMAL

## 2021-03-19 ENCOUNTER — DOCUMENTATION (OUTPATIENT)
Dept: NEUROLOGY | Facility: CLINIC | Age: 31
End: 2021-03-19

## 2021-03-19 NOTE — PROGRESS NOTES
Type Date User Summary Attachment   General 03/18/2021  3:11 PM [facility] care coordination  -   Note    Botox- authorization #: [id number]- 2nd visit- valid from 2/1/2021 until 1/31/2022   Please use our stock      Thank you,     Ralf Tai

## 2021-04-02 ENCOUNTER — OFFICE VISIT (OUTPATIENT)
Dept: GASTROENTEROLOGY | Facility: CLINIC | Age: 31
End: 2021-04-02
Payer: COMMERCIAL

## 2021-04-02 VITALS
DIASTOLIC BLOOD PRESSURE: 60 MMHG | WEIGHT: 131 LBS | SYSTOLIC BLOOD PRESSURE: 110 MMHG | HEART RATE: 101 BPM | HEIGHT: 61 IN | TEMPERATURE: 99.2 F | BODY MASS INDEX: 24.73 KG/M2

## 2021-04-02 DIAGNOSIS — K21.9 GASTROESOPHAGEAL REFLUX DISEASE WITHOUT ESOPHAGITIS: ICD-10-CM

## 2021-04-02 DIAGNOSIS — R14.0 ABDOMINAL BLOATING: ICD-10-CM

## 2021-04-02 DIAGNOSIS — K58.1 IRRITABLE BOWEL SYNDROME WITH CONSTIPATION: Primary | ICD-10-CM

## 2021-04-02 DIAGNOSIS — Z80.0 FAMILY HISTORY OF COLON CANCER IN MOTHER: ICD-10-CM

## 2021-04-02 DIAGNOSIS — K62.5 RECTAL BLEEDING: ICD-10-CM

## 2021-04-02 PROCEDURE — 99244 OFF/OP CNSLTJ NEW/EST MOD 40: CPT | Performed by: INTERNAL MEDICINE

## 2021-04-02 RX ORDER — PLECANATIDE 3 MG/1
1 TABLET ORAL DAILY
Qty: 30 TABLET | Refills: 5 | Status: ON HOLD | OUTPATIENT
Start: 2021-04-02 | End: 2022-06-01

## 2021-04-02 RX ORDER — LAMOTRIGINE 150 MG/1
150 TABLET ORAL DAILY
COMMUNITY
Start: 2021-04-01

## 2021-04-02 RX ORDER — SODIUM, POTASSIUM,MAG SULFATES 17.5-3.13G
177 SOLUTION, RECONSTITUTED, ORAL ORAL ONCE
Qty: 2 BOTTLE | Refills: 0 | Status: SHIPPED | OUTPATIENT
Start: 2021-04-02 | End: 2021-05-12 | Stop reason: ALTCHOICE

## 2021-04-02 NOTE — PROGRESS NOTES
[facility] - Outpatient Consultation  Sonny Puri 32 y o  female MRN: [id number]  Encounter: [id number]      PCP: Ashanti Cruz PA-C  Referring: JUANITA Cedeno [id number]  [place],  [address]      ASSESSMENT AND PLAN:      1  Irritable bowel syndrome with constipation   therapeutic failure of MiraLax -persistent symptoms of incomplete evacuation, abdominal bloating, rectal bleeding  - Plecanatide (Trulance) 3 MG TABS; Take 1 tablet by mouth daily  Dispense: 30 tablet; Refill: 5    2  Rectal bleeding  3  Family history of colon cancer in mother  - Colonoscopy; Future  - Na Sulfate-K Sulfate-Mg Sulf (Suprep Bowel Prep Kit) 17 5-3 13-1 6 GM/177ML SOLN; Take 177 mL by mouth once for 1 dose  Dispense: 2 Bottle; Refill: 0    4  Abdominal bloating  Differential includes celiac disease versus IBS versus functional versus SIBO versus other   recommend peppermint oil in the form of IBGard or OTC   [facility] diaphragmatic breathing for abdominal bloating videos  - EGD; Future    5  Gastroesophageal reflux disease without esophagitis  -   EGD to assess for erosive GERD, rule out peptic ulcer disease, may be related to underlying GI dysmotility/IBS versus other      ______________________________________________________________________    CC:  Chief Complaint   Patient presents with    Rectal Bleeding     mixed in the stool and when she wipes    Constipation    Bloated     poss gluten issues    GERD     acid reflux       HPI:       patient is a 22-year-old female referred for abdominal pain, constipation, IBS -C diff, abdominal bloating, family history of colon cancer  She has IBS -C, hypothyroidism, migraine headaches, Raynaud's disease, myofascial pain syndrome (lumbar, cervical, throacolumbar),  Renal calculi, frequent UTIs, bipolar one disorder/anxiety, depression,  Lupus, HLD  She has several years of constipation  She moves her bowels once every five days    She has been using MiraLax daily one capsule to result in daily bowel movements  However symptoms of incomplete evacuation persist as well as changes in stool caliber and alternating stool habits  She describes associated bloating, fullness, stabbing abdominal pain, gas pains that are unrelieved with MiraLax treatment  These were improved with Linzess at 72 and 145 mcg - prescription ran out and this  Was also limited by some loose stools  Her last colonoscopy was performed in 2013 by Dr Robson Blank, and she was noted to have a tortuous colon  She has episodes of bright red blood per rectum, mixed in the stool and when she wipes  Bloating is worse after meals, and also associated with fecal urgency  She questions celiac disease or gluten intolerance  She is lactose intolerant and avoids dairy products  She also has a mother was diagnosed with colon cancer in her 46s  She has recently started a probiotic over the last two weeks  She describes acid reflux  No unintentional weight loss  REVIEW OF SYSTEMS:    CONSTITUTIONAL: Denies any fever, chills, rigors, and weight loss  HEENT: No earache or tinnitus  Denies hearing loss or visual disturbances  CARDIOVASCULAR: No chest pain or palpitations  RESPIRATORY: Denies any cough, hemoptysis, shortness of breath or dyspnea on exertion  GASTROINTESTINAL: As noted in the History of Present Illness  GENITOURINARY: No problems with urination  Denies any hematuria or dysuria  NEUROLOGIC: No dizziness or vertigo, + migraine headaches  MUSCULOSKELETAL: +chronic pain disorder  SKIN: Denies skin rashes or itching  ENDOCRINE: Denies excessive thirst  Denies intolerance to heat or cold  PSYCHOSOCIAL: ++depression and anxiety  Denies any recent memory loss         Historical Information   Past Medical History:   Diagnosis Date    Anxiety     Depression     Encounter for IUD removal and reinsertion 5/29/2019    Migraines      Past Surgical History:   Procedure Laterality Date    COLONOSCOPY      INGUINAL HERNIA REPAIR  10/1998    REDUCTION MAMMAPLASTY  03/2013    TONSILLECTOMY  06/2011    TONSILLECTOMY      UPPER GASTROINTESTINAL ENDOSCOPY      WISDOM TOOTH EXTRACTION       Social History   Social History     Substance and Sexual Activity   Alcohol Use Yes    Comment: social     Social History     Substance and Sexual Activity   Drug Use Never     Social History     Tobacco Use   Smoking Status Never Smoker   Smokeless Tobacco Never Used     Family History   Problem Relation Age of Onset    Colon cancer Mother     Arthritis Mother     Thyroid disease Mother     Depression Mother     Anxiety disorder Mother     Clotting disorder Mother     Hyperlipidemia Mother     Vesicoureteral reflux Mother     Irritable bowel syndrome Mother     Migraines Mother     Skin cancer Mother     Thyroid disease unspecified Mother     Heart disease Father     Hypertension Father     Arthritis Father     Hyperlipidemia Father     Vesicoureteral reflux Father     Hypertension Brother     Hyperlipidemia Brother     COPD Maternal Grandmother     Stroke Maternal Grandmother     Lung cancer Maternal Grandmother     Arthritis Maternal Grandmother     Asthma Maternal Grandmother     Hyperlipidemia Maternal Grandmother     Vesicoureteral reflux Maternal Grandmother     Heart disease Maternal Grandmother     Hypertension Maternal Grandmother     Migraines Maternal Grandmother     Osteoporosis Maternal Grandmother     Heart attack Maternal Grandfather     Heart disease Maternal Grandfather     Diabetes Maternal Grandfather     Heart disease Paternal Grandmother     Hyperlipidemia Paternal Grandmother     Diabetes Paternal Grandfather     Kidney disease Maternal Aunt     Migraines Maternal Aunt     Heart attack Maternal Uncle     Heart disease Maternal Uncle     Melanoma Maternal Uncle     Lupus Cousin        Meds/Allergies       Current Outpatient Medications:    amphetamine-dextroamphetamine (ADDERALL) 20 mg tablet    buPROPion (WELLBUTRIN SR) 150 mg 12 hr tablet    celecoxib (CeleBREX) 200 mg capsule    cyclobenzaprine (FLEXERIL) 5 mg tablet    dexamethasone (DECADRON) 1 mg tablet    dexamethasone (DECADRON) 1 mg tablet    diazepam (VALIUM) 5 mg tablet    famotidine (PEPCID) 20 mg tablet    fexofenadine (ALLEGRA) 180 MG tablet    FLUoxetine (PROzac) 10 mg capsule    hydroxychloroquine (PLAQUENIL) 200 mg tablet    Ibuprofen 200 MG CAPS    lamoTRIgine (LaMICtal) 100 mg tablet    lamoTRIgine (LaMICtal) 150 MG tablet    levonorgestrel (KYLEENA) 19 5 MG intrauterine device    levothyroxine 100 mcg tablet    methocarbamol (ROBAXIN) 500 mg tablet    mupirocin (BACTROBAN) 2 % ointment    naproxen (NAPROSYN) 500 mg tablet    nitrofurantoin (MACROBID) 100 mg capsule    nitrofurantoin (MACRODANTIN) 50 mg capsule    OLANZapine-FLUoxetine (SYMBYAX) 3-25 MG per capsule    Plecanatide (Trulance) 3 MG TABS    polyethylene glycol (MIRALAX) 17 g packet    predniSONE 2 5 mg tablet    predniSONE 5 mg tablet    prochlorperazine (COMPAZINE) 10 mg tablet    Rimegepant Sulfate (Nurtec) 75 MG TBDP    SUMAtriptan (IMITREX) 100 mg tablet    topiramate (TOPAMAX) 50 MG tablet    Allergies   Allergen Reactions    Oxcarbazepine Hives     Hives           Objective     Blood pressure 110/60, pulse 101, temperature 99 2 °F (37 3 °C), temperature source Tympanic, height 5' 1" (1 549 m), weight 59 4 kg (131 lb), not currently breastfeeding  Body mass index is 24 75 kg/m²  PHYSICAL EXAM:      General Appearance:   Alert, cooperative, no distress   HEENT:   Normocephalic, atraumatic, anicteric  Neck:  Supple, symmetrical, trachea midline   Lungs:   Clear to auscultation bilaterally; no rales, rhonchi or wheezing; respirations unlabored    Heart[de-identified]   Regular rate and rhythm; no murmur, rub, or gallop     Abdomen:   Soft, + lower abdominal tenderness to palpation, non-distended; normal bowel sounds; no masses, no organomegaly    Genitalia:   Deferred    Rectal:   Deferred    Extremities:  No cyanosis, clubbing or edema    Pulses:  2+ and symmetric    Skin:  No jaundice, rashes, or lesions    Lymph nodes:  No palpable cervical lymphadenopathy        Lab Results:     Lab Results   Component Value Date    WBC 6 42 03/12/2021    HGB 14 4 03/12/2021    HCT 40 4 03/12/2021    MCV 89 03/12/2021     03/12/2021       Lab Results   Component Value Date    K 3 7 03/12/2021     (H) 03/12/2021    CO2 23 03/12/2021    BUN 14 03/12/2021    CREATININE 1 06 03/12/2021    GLUF 92 05/09/2020    CALCIUM 9 0 03/12/2021    AST 8 03/12/2021    ALT 21 03/12/2021    ALKPHOS 55 03/12/2021    EGFR 70 03/12/2021       No results found for: INR, PROTIME      Radiology Results:   No results found  Portions of the record may have been created with voice recognition software  Occasional wrong word or "sound a like" substitutions may have occurred due to the inherent limitations of voice recognition software  Read the chart carefully and recognize, using context, where substitutions have occurred

## 2021-04-02 NOTE — PATIENT INSTRUCTIONS
- for bloating,  Peppermint oil = 180 mg 3x/day OR IBGard to take as directed  - google youtube [employer] diaphragmatic breathing exercise for bloating    EGD/COLON scheduled on 5/12/21 with Dr Peters at [facility] pt verbal instructions/paper work given  [employer]

## 2021-04-05 ENCOUNTER — TELEPHONE (OUTPATIENT)
Dept: NEUROLOGY | Facility: CLINIC | Age: 31
End: 2021-04-05

## 2021-04-05 NOTE — TELEPHONE ENCOUNTER
Called patient and I left a message informing her of the change in her appointment time 04/16/21 from 02:00pm to 01:45pm

## 2021-04-16 ENCOUNTER — OFFICE VISIT (OUTPATIENT)
Dept: NEUROLOGY | Facility: CLINIC | Age: 31
End: 2021-04-16
Payer: COMMERCIAL

## 2021-04-16 VITALS
SYSTOLIC BLOOD PRESSURE: 117 MMHG | DIASTOLIC BLOOD PRESSURE: 73 MMHG | WEIGHT: 131.3 LBS | TEMPERATURE: 96.7 F | BODY MASS INDEX: 24.79 KG/M2 | HEART RATE: 97 BPM | HEIGHT: 61 IN

## 2021-04-16 DIAGNOSIS — M79.18 CERVICAL MYOFASCIAL PAIN SYNDROME: Primary | ICD-10-CM

## 2021-04-16 DIAGNOSIS — G43.701 CHRONIC MIGRAINE WITHOUT AURA WITH STATUS MIGRAINOSUS, NOT INTRACTABLE: ICD-10-CM

## 2021-04-16 PROCEDURE — 20553 NJX 1/MLT TRIGGER POINTS 3/>: CPT | Performed by: PHYSICIAN ASSISTANT

## 2021-04-16 PROCEDURE — 99214 OFFICE O/P EST MOD 30 MIN: CPT | Performed by: PHYSICIAN ASSISTANT

## 2021-04-16 RX ORDER — DEXAMETHASONE 1 MG
1 TABLET ORAL
Qty: 10 TABLET | Refills: 0 | Status: SHIPPED | OUTPATIENT
Start: 2021-04-16 | End: 2022-05-05 | Stop reason: SDUPTHER

## 2021-04-16 RX ORDER — BUPIVACAINE HYDROCHLORIDE 2.5 MG/ML
4 INJECTION, SOLUTION EPIDURAL; INFILTRATION; INTRACAUDAL ONCE
Status: COMPLETED | OUTPATIENT
Start: 2021-04-16 | End: 2021-04-16

## 2021-04-16 RX ADMIN — BUPIVACAINE HYDROCHLORIDE 4 ML: 2.5 INJECTION, SOLUTION EPIDURAL; INFILTRATION; INTRACAUDAL at 15:08

## 2021-04-16 RX ADMIN — BUPIVACAINE HYDROCHLORIDE 4 ML: 2.5 INJECTION, SOLUTION EPIDURAL; INFILTRATION; INTRACAUDAL at 14:08

## 2021-04-16 NOTE — PROGRESS NOTES
Patient ID: Enrique Gandara is a 32 y o  female  Assessment/Plan:    Chronic migraine without aura with status migrainosus, not intractable  PREVENTATIVE  Continue Botox every 3 months  Topamax 50 mg BID  Continue all your other medications prescribed  Cyclobenzaprine 5 mg at bedtime    ABORTIVE:  At onset of migraine, use Nurtec 75 mg  Limit 1 of in 24 hours  In addition you can try Prochlorperazine 10 mg to break your migraine, either alone or with imitrex  If repeat the Sumatriptan or still have lingering pain Toradol 10 mg at bedtime  If you wake up next day with a migraine, Decadron 1 mg    If this does not break your migraine, call us    Cervical myofascial pain syndrome    Continue TPI as part of ongoing and  continuous management          Problem List Items Addressed This Visit        Cardiovascular and Mediastinum    Chronic migraine without aura with status migrainosus, not intractable     PREVENTATIVE  Continue Botox every 3 months  Topamax 50 mg BID  Continue all your other medications prescribed  Cyclobenzaprine 5 mg at bedtime    ABORTIVE:  At onset of migraine, use Nurtec 75 mg  Limit 1 of in 24 hours      In addition you can try Prochlorperazine 10 mg to break your migraine, either alone or with imitrex  If repeat the Sumatriptan or still have lingering pain Toradol 10 mg at bedtime  If you wake up next day with a migraine, Decadron 1 mg    If this does not break your migraine, call us         Relevant Medications    bupivacaine (PF) (MARCAINE) 0 25 % injection 4 mL (Completed)    dexamethasone (DECADRON) 1 mg tablet    Ubrogepant (UBRELVY) 100 MG tablet    bupivacaine (PF) (MARCAINE) 0 25 % injection 4 mL (Completed)       Musculoskeletal and Integument    Cervical myofascial pain syndrome - Primary       Continue TPI as part of ongoing and  continuous management          Relevant Medications    bupivacaine (PF) (MARCAINE) 0 25 % injection 4 mL (Completed)    triamcinolone acetonide (KENALOG-10) 10 mg/mL injection 10 mg (Completed)    bupivacaine (PF) (MARCAINE) 0 25 % injection 4 mL (Completed)    triamcinolone acetonide (KENALOG-10) 10 mg/mL injection 10 mg (Completed)    Other Relevant Orders    Inj Trigger Point Single/Multiple             Subjective:    HPI  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    PREVENTATIVE:  Topamax, Inderal (hypotension and no help), butterbur, B2, Magnesium, Gabapentin (irritablity/anxiety), Tegretol (intially helped but then failed), Trileptal (skin reaction-allergy vs [place]), Botox  ABORTIVE:  Rizatriptan (ineffective), Imitrex nasal spray (made vomit), Naproxen, Excedrin, Fioricet, Nucynta, Tylenol, Skelaxin, Ibuprofen, Sumatriptan     Alternative therapies used in the past for headaches? Physical therapy, massage  Headache are worse if the patient: moving bowel, concentration, clenching jaw  Headache triggers:  Menses, stress, Caffeine (too little or too much), weather changes, missing meals, exercise, fatigue     Aura/warning and how long does it last - none     What is your current pain level - 5/10      Any family history of migraines? Yes, mother, maternal aunts, maternal grandmother  Any family history of aneurysms? No     Headaches started at what age? 9years old  How often do the headaches occur? 8-10 a month prior was daily*  What time of the day do the headaches start? Usually wakes up with them  How long do the headaches last? All day  Are you ever headache free? Yes  Describe your usual headache - Throbbing, crushing, pulsating, Pressure,  Sharp, Stabbing,  Nagging  Where is your headache located? Behind eyes, eyebrow, frontals, occipitalis, neck  What is the intensity of pain? 4-10/10; last 10/10 was Saturday; average 5-6/10  Associated symptoms:   · nausea, vomiting (rare)  · phonophobia   · Problem with concentration  · Blurred vision  · Red ear   · Lacrimation, runny or stuffed-up nose, flushing of face  · light-headed or dizzy, stiff or sore neck   · Hands or feet tingle or feel numb, prefer to be alone and in a quiet room, unable to work     Number of days missed per month because of headaches:  Work (or school) days: 0 (doesn't miss but may go home early 2-3)  Social or Family activities: 2-3      What time of the year do headaches occur more frequently?   none  Have you seen someone else for headaches or pain? Yes, [facility]  Have you had trigger point injection performed and how often? No  Have you had Botox injection performed and how often? Yes   Have you had epidural injections or transforaminal injections performed? No     Have you used CBD or THC for your headaches and how often? No  Are you current pregnant or planning on getting pregnant? No, has Mirena  Have you ever had any Brain imaging? yes       With botox has had a reduction of at least 7 migraine days with less abortive medication, less ER visits which correlates to headache diary          Inj Trigger Point Single/Multiple     Date/Time 4/16/2021 1:50 PM     Performed by  Aniket Wills PA-C     Authorized by Aniekt Wills PA-C      Universal Protocol   Consent: Verbal consent obtained  Written consent obtained    Risks and benefits: risks, benefits and alternatives were discussed  Consent given by: patient  Patient understanding: patient states understanding of the procedure being performed  Patient consent: the patient's understanding of the procedure matches consent given  Procedure consent: procedure consent matches procedure scheduled  Relevant documents: relevant documents present and verified  Patient identity confirmed: verbally with patient        Local anesthesia used: no Anesthesia   Local anesthesia used: no     Sedation   Patient sedated: no        Specimen: no    Culture: no   Procedure Details   Procedure Notes: Using 8 ml of 0 25%bupivicaine and 2 ml of Kenalog 10mg multiple injections were placed in her trapezius bilaterally, occipitalis bilaterally and cervical paraspinalis bilateralyl  This is part of ongoing and continuous management of her care  Patient had immediate relief of her migraine with the injections    Patient tolerance: patient tolerated the procedure well with no immediate complications                    Objective:    Blood pressure 117/73, pulse 97, temperature (!) 96 7 °F (35 9 °C), temperature source Temporal, height 5' 1" (1 549 m), weight 59 6 kg (131 lb 4 8 oz), not currently breastfeeding  Physical Exam    Neurological Exam    CONSTITUTIONAL: Well developed, well nourished, well groomed  No dysmorphic features  Eyes:  EOM normal      Neck:  Normal ROM, neck supple  HEENT:  Normocephalic atraumatic  Chest:  Respirations regular and unlabored  Psychiatric:  Normal behavior and appropriate affect      MENTAL STATUS  Orientation: Alert and oriented x 3  Fund of knowledge: Intact  MOTOR (Upper and lower extremities)   Bulk/tone/abnormal movement: Normal muscle bulk and tone  COORDINATION   Station/Gait: Normal baseline gait  ROS:    Review of Systems  Constitutional: Negative  HENT: Negative  Eyes: Negative  Respiratory: Negative  Cardiovascular: Negative  Gastrointestinal: Negative  Endocrine: Negative  Genitourinary: Negative  Musculoskeletal: Negative  Skin: Negative  Allergic/Immunologic: Negative  Neurological: Positive for headaches  Hematological: Negative      Psychiatric/Behavioral: Negative         I personally reviewed and updated the ROS that was entered by the medical assistant  Greater than 50% of the 20 minutes evaluation was a face-to-face discussion regarding  the pathophysiology of her current symptoms and further plan, as well as counseling, educating, and coordinating the patient's care including pathogenesis of diagnosis, prognosis of diagnosis, diagnostic results, impression, and recommendations, risks and benefits of treatment and instructions for disease self management and 15 minutes of non face to face time

## 2021-04-16 NOTE — PATIENT INSTRUCTIONS
PREVENTATIVE  Continue Botox every 3 months  Topamax 50 mg BID  Continue all your other medications prescribed  Cyclobenzaprine 5 mg at bedtime    ABORTIVE:  At onset of migraine, use Nurtec 75 mg  Limit 1 of in 24 hours  In addition you can try Prochlorperazine 10 mg to break your migraine, either alone or with imitrex  If repeat the Sumatriptan or still have lingering pain Toradol 10 mg at bedtime  If you wake up next day with a migraine, Decadron 1 mg    If this does not break your migraine, call us    Neck pain:   - We discussed the role of neck pathology and poor posture, with straightening of the normal cervical lordosis, in headaches  We discussed how tightening of the neck muscles can irritate the nerves in the occipital region of her head and cause or worsen head pain  We also discussed and demonstrated neck strengthening and relaxation exercises, as well as giving written instructions on these exercises  - We talked about the importance of good posture for improving shoulder, neck, and head pain  The patient was given visualization exercises for correcting posture, which patient will practice at home  If this simple exercise does not help improve the posture, we will consider formal physical therapy in the future  Medication overuse headaches:   - We discussed medication overuse headache [facility]) and how to avoid it in the future  It was explained that all analgesics have the potential to cause medication overuse headache [facility]) and analgesic overuse can negate the effectiveness of headache preventive measures  After successful [address] treatment, preventive medications for an underlying primary headache disorder have a greater chance for success  Avoid medications with narcotics, barbiturates, or caffeine in them as these can cause rebound headaches after very few doses and can interfere with other headache medicine efficacy   Taking any analgesics for more than 2-3 days a week can cause medication overuse headache  Reproductive age women: Should take folic acid daily when taking anti-seizure drugs especially Depakote  [place] Prescription Drug Monitoring Program report was reviewed and was appropriate      Headache management instructions  - When patient has a moderate to severe headache, they should seek rest, initiate relaxation and apply cold compresses to the head  - Maintain regular sleep schedule  Adults need at least 7-8 hours of uninterrupted a night  - Limit over the counter medications such as Tylenol, Ibuprofen, Aleve, Excedrin  (No more than 3 times a week)  - Maintain headache diary  We discussed an TOM for a smart phone is "Migraine celso"  - Limit caffeine to 1-2 cups 8 to 16 oz a day or less  - Avoid dietary trigger  (aged cheese, peanuts, MSG, aspartame and nitrates)  - Patient is to have regular frequent meals to prevent headache onset  - Please drink at least 64 ounces of water a day to help remain hydrated  Please call with any questions or concerns   Office number is [phone]

## 2021-04-16 NOTE — ASSESSMENT & PLAN NOTE
PREVENTATIVE  Continue Botox every 3 months  Topamax 50 mg BID  Continue all your other medications prescribed  Cyclobenzaprine 5 mg at bedtime    ABORTIVE:  At onset of migraine, use Nurtec 75 mg  Limit 1 of in 24 hours      In addition you can try Prochlorperazine 10 mg to break your migraine, either alone or with imitrex  If repeat the Sumatriptan or still have lingering pain Toradol 10 mg at bedtime  If you wake up next day with a migraine, Decadron 1 mg    If this does not break your migraine, call us

## 2021-04-28 ENCOUNTER — TELEPHONE (OUTPATIENT)
Dept: RHEUMATOLOGY | Facility: CLINIC | Age: 31
End: 2021-04-28

## 2021-04-28 NOTE — TELEPHONE ENCOUNTER
Noé Zimmerman returned my call, she is ok with trying the diclofenac  Please send to [facility])  She has been advised to check with pharmacy tomorrow at some point and to call if there are any other problems or questions

## 2021-04-28 NOTE — TELEPHONE ENCOUNTER
Patient sees Bogdan Cody  Patient is calling in requesting a call back  She states she is experiencing a flare up  She is experiencing a lot of joint pain, swelling and a lot of fatigue  She is taking aleve and it's not helping at all  She would like to know if she can try something else? She wants to avoid predisone           Please advise,       Edin#: [phone]

## 2021-04-29 DIAGNOSIS — M54.40 BACK PAIN OF LUMBAR REGION WITH SCIATICA: Primary | ICD-10-CM

## 2021-04-29 RX ORDER — DICLOFENAC SODIUM 75 MG/1
75 TABLET, DELAYED RELEASE ORAL 2 TIMES DAILY
Qty: 60 TABLET | Refills: 3 | Status: SHIPPED | OUTPATIENT
Start: 2021-04-29 | End: 2021-06-18 | Stop reason: SDUPTHER

## 2021-05-03 ENCOUNTER — TELEPHONE (OUTPATIENT)
Dept: NEUROLOGY | Facility: CLINIC | Age: 31
End: 2021-05-03

## 2021-05-03 NOTE — TELEPHONE ENCOUNTER
May take toradol now  Take prochlorperazine 10 mg now and every 8 hours  May take 2 tabs of the olanzapine at bedtime

## 2021-05-03 NOTE — TELEPHONE ENCOUNTER
pt called and states that migraine began this am and meds not effective  took diclofenac 75mg which was prescribed by rheum, this is prescribed bid   took imitrex not effective, then took decadron and nurtec, also ineffective  states that decadron is always effective  states that she wasn't sure if she could take toradol since she took diclofenac   currently 9/10  +light/sound sensitivity/dizziness  topamax 50mg in am and 100mg in pm  botox-scheduled for this Friday   Is not taking cyclobenzaprine-states that this is prn    Olanzapine 3mg hs-prescribed by psychiatrist  Never tried depakote  She did not try prochlorperazine  Please advise  [phone] to leave detailed message

## 2021-05-04 ENCOUNTER — TELEPHONE (OUTPATIENT)
Dept: OBGYN CLINIC | Facility: HOSPITAL | Age: 31
End: 2021-05-04

## 2021-05-04 DIAGNOSIS — M32.19 SYSTEMIC LUPUS ERYTHEMATOSUS WITH OTHER ORGAN INVOLVEMENT, UNSPECIFIED SLE TYPE (HCC): Primary | ICD-10-CM

## 2021-05-04 RX ORDER — PREDNISONE 10 MG/1
TABLET ORAL
Qty: 21 TABLET | Refills: 0 | Status: SHIPPED | OUTPATIENT
Start: 2021-05-04 | End: 2021-05-18

## 2021-05-07 ENCOUNTER — PROCEDURE VISIT (OUTPATIENT)
Dept: NEUROLOGY | Facility: CLINIC | Age: 31
End: 2021-05-07
Payer: COMMERCIAL

## 2021-05-07 VITALS — HEART RATE: 105 BPM | TEMPERATURE: 98.1 F | SYSTOLIC BLOOD PRESSURE: 121 MMHG | DIASTOLIC BLOOD PRESSURE: 76 MMHG

## 2021-05-07 DIAGNOSIS — G43.701 CHRONIC MIGRAINE WITHOUT AURA WITH STATUS MIGRAINOSUS, NOT INTRACTABLE: Primary | ICD-10-CM

## 2021-05-07 PROCEDURE — 64615 CHEMODENERV MUSC MIGRAINE: CPT | Performed by: PHYSICIAN ASSISTANT

## 2021-05-07 RX ORDER — KETOROLAC TROMETHAMINE 10 MG/1
10 TABLET, FILM COATED ORAL EVERY 6 HOURS PRN
Qty: 10 TABLET | Refills: 3 | Status: SHIPPED | OUTPATIENT
Start: 2021-05-07 | End: 2021-10-01 | Stop reason: SDUPTHER

## 2021-05-07 NOTE — PROGRESS NOTES
Universal Protocol   Consent: Verbal consent obtained  Written consent obtained  Risks and benefits: risks, benefits and alternatives were discussed  Consent given by: patient  Time out: Immediately prior to procedure a "time out" was called to verify the correct patient, procedure, equipment, support staff and site/side marked as required  Patient understanding: patient states understanding of the procedure being performed  Patient consent: the patient's understanding of the procedure matches consent given  Procedure consent: procedure consent matches procedure scheduled  Relevant documents: relevant documents present and verified  Patient identity confirmed: verbally with patient        Chemodenervation     Date/Time 5/7/2021 2:04 PM     Performed by  Jitendra Ritter PA-C     Authorized by Jitendra Ritter PA-C        Pre-procedure details      Prepped With: Alcohol     Anesthesia  (see MAR for exact dosages):      Anesthesia method:  None   Procedure details     Position:  Upright   Botox     Botox Type:  Type A    Brand:  Botox    mL's of Botulinum Toxin:  200    Final Concentration per CC:  50 units    Needle Gauge:  30 G 2 5 inch   Procedures     Botox Procedures: chronic headache      Indications: migraines     Injection Location      Head / Face:  L superior cervical paraspinal, R superior cervical paraspinal, L , R , L frontalis, R frontalis, L medial occipitalis, R medial occipitalis, procerus, R temporalis, L temporalis, R superior trapezius and L superior trapezius    L  injection amount:  5 unit(s)    R  injection amount:  5 unit(s)    L lateral frontalis:  5 unit(s)    R lateral frontalis:  5 unit(s)    L medial frontalis:  5 unit(s)    R medial frontalis:  5 unit(s)    L temporalis injection amount:  20 unit(s)    R temporalis injection amount:  20 unit(s)    Procerus injection amount:  5 unit(s)    L medial occipitalis injection amount:  15 unit(s)    R medial occipitalis injection amount:  15 unit(s)    L superior cervical paraspinal injection amount:  10 unit(s)    R superior cervical paraspinal injection amount:  10 unit(s)    L superior trapezius injection amount:  15 unit(s)    R superior trapezius injection amount:  15 unit(s)   Total Units     Total units used:  200    Total units discarded:  0   Post-procedure details      Chemodenervation:  Chronic migraine    Facial Nerve Location[de-identified]  Bilateral facial nerve    Patient tolerance of procedure:   Tolerated well, no immediate complications   Comments      5 units orbicularis oculi bilaterally  20 units frontalis   15 units temporalis  All medically necessary

## 2021-05-10 ENCOUNTER — ANESTHESIA EVENT (OUTPATIENT)
Dept: GASTROENTEROLOGY | Facility: MEDICAL CENTER | Age: 31
End: 2021-05-10

## 2021-05-12 ENCOUNTER — ANESTHESIA (OUTPATIENT)
Dept: GASTROENTEROLOGY | Facility: MEDICAL CENTER | Age: 31
End: 2021-05-12

## 2021-05-12 ENCOUNTER — HOSPITAL ENCOUNTER (OUTPATIENT)
Dept: GASTROENTEROLOGY | Facility: MEDICAL CENTER | Age: 31
Setting detail: OUTPATIENT SURGERY
Discharge: HOME | End: 2021-05-12
Attending: INTERNAL MEDICINE | Admitting: INTERNAL MEDICINE
Payer: COMMERCIAL

## 2021-05-12 VITALS
OXYGEN SATURATION: 99 % | DIASTOLIC BLOOD PRESSURE: 60 MMHG | SYSTOLIC BLOOD PRESSURE: 94 MMHG | HEART RATE: 88 BPM | RESPIRATION RATE: 20 BRPM

## 2021-05-12 DIAGNOSIS — Z80.0 FAMILY HISTORY OF COLON CANCER IN MOTHER: ICD-10-CM

## 2021-05-12 DIAGNOSIS — K62.5 RECTAL BLEEDING: ICD-10-CM

## 2021-05-12 DIAGNOSIS — R14.0 ABDOMINAL BLOATING: ICD-10-CM

## 2021-05-12 LAB
EXT PREGNANCY TEST URINE: NEGATIVE
EXT. CONTROL: NORMAL

## 2021-05-12 PROCEDURE — 88305 TISSUE EXAM BY PATHOLOGIST: CPT | Performed by: PATHOLOGY

## 2021-05-12 PROCEDURE — 45380 COLONOSCOPY AND BIOPSY: CPT | Performed by: INTERNAL MEDICINE

## 2021-05-12 PROCEDURE — 81025 URINE PREGNANCY TEST: CPT | Performed by: ANESTHESIOLOGY

## 2021-05-12 PROCEDURE — 43239 EGD BIOPSY SINGLE/MULTIPLE: CPT | Performed by: INTERNAL MEDICINE

## 2021-05-12 RX ORDER — SODIUM CHLORIDE 9 MG/ML
125 INJECTION, SOLUTION INTRAVENOUS CONTINUOUS
Status: DISCONTINUED | OUTPATIENT
Start: 2021-05-12 | End: 2021-05-16 | Stop reason: HOSPADM

## 2021-05-12 RX ORDER — PROPOFOL 10 MG/ML
INJECTION, EMULSION INTRAVENOUS AS NEEDED
Status: DISCONTINUED | OUTPATIENT
Start: 2021-05-12 | End: 2021-05-12

## 2021-05-12 RX ADMIN — PROPOFOL 100 MG: 10 INJECTION, EMULSION INTRAVENOUS at 10:25

## 2021-05-12 RX ADMIN — PROPOFOL 100 MG: 10 INJECTION, EMULSION INTRAVENOUS at 10:30

## 2021-05-12 RX ADMIN — PROPOFOL 100 MG: 10 INJECTION, EMULSION INTRAVENOUS at 10:34

## 2021-05-12 RX ADMIN — PROPOFOL 100 MG: 10 INJECTION, EMULSION INTRAVENOUS at 10:22

## 2021-05-12 RX ADMIN — PROPOFOL 100 MG: 10 INJECTION, EMULSION INTRAVENOUS at 10:42

## 2021-05-12 RX ADMIN — SODIUM CHLORIDE 125 ML/HR: 0.9 INJECTION, SOLUTION INTRAVENOUS at 10:14

## 2021-05-12 RX ADMIN — PROPOFOL 100 MG: 10 INJECTION, EMULSION INTRAVENOUS at 10:23

## 2021-05-12 NOTE — ANESTHESIA POSTPROCEDURE EVALUATION
Post-Op Assessment Note    CV Status:  Stable  Pain Score: 1    Pain management: adequate     Mental Status:  Alert and awake   Hydration Status:  Euvolemic   PONV Controlled:  Controlled   Airway Patency:  Patent      Post Op Vitals Reviewed: Yes      Staff: Anesthesiologist         No complications documented      BP (!) 87/53 (05/12/21 1055)    Temp      Pulse 86 (05/12/21 1055)   Resp 17 (05/12/21 1055)    SpO2 98 % (05/12/21 1055)

## 2021-05-12 NOTE — H&P
History and Physical -  [facility]  Maria Isabel Goodman 32 y o  female MRN: [id number]                  HPI: Desiree Drew is a 32y o  year old female who presents for rectal bleeding, abdominal pain      REVIEW OF SYSTEMS: Per the HPI, and otherwise unremarkable      Historical Information   Past Medical History:   Diagnosis Date    Anxiety     Depression     Encounter for IUD removal and reinsertion 5/29/2019    Lupus ([address] )     Migraines     PONV (postoperative nausea and vomiting)      Past Surgical History:   Procedure Laterality Date    COLONOSCOPY      INGUINAL HERNIA REPAIR  10/1998    REDUCTION MAMMAPLASTY  03/2013    TONSILLECTOMY  06/2011    TONSILLECTOMY      UPPER GASTROINTESTINAL ENDOSCOPY      WISDOM TOOTH EXTRACTION       Social History   Social History     Substance and Sexual Activity   Alcohol Use Yes    Comment: social     Social History     Substance and Sexual Activity   Drug Use Never     Social History     Tobacco Use   Smoking Status Never Smoker   Smokeless Tobacco Never Used     Family History   Problem Relation Age of Onset    Colon cancer Mother     Arthritis Mother     Thyroid disease Mother     Depression Mother     Anxiety disorder Mother     Clotting disorder Mother     Hyperlipidemia Mother     Vesicoureteral reflux Mother     Irritable bowel syndrome Mother    [facility] Migraines Mother     Skin cancer Mother     Thyroid disease unspecified Mother     Heart disease Father     Hypertension Father     Arthritis Father     Hyperlipidemia Father     Vesicoureteral reflux Father     Hypertension Brother     Hyperlipidemia Brother     COPD Maternal Grandmother     Stroke Maternal Grandmother     Lung cancer Maternal Grandmother     Arthritis Maternal Grandmother     Asthma Maternal Grandmother     Hyperlipidemia Maternal Grandmother     Vesicoureteral reflux Maternal Grandmother     Heart disease Maternal Grandmother     Hypertension Maternal Grandmother     Migraines Maternal Grandmother     Osteoporosis Maternal Grandmother     Heart attack Maternal Grandfather     Heart disease Maternal Grandfather     Diabetes Maternal Grandfather     Heart disease Paternal Grandmother     Hyperlipidemia Paternal Grandmother     Diabetes Paternal Grandfather     Kidney disease Maternal Aunt     Migraines Maternal Aunt     Heart attack Maternal Uncle     Heart disease Maternal Uncle     Melanoma Maternal Uncle     Lupus Cousin        Meds/Allergies       Current Outpatient Medications:     amphetamine-dextroamphetamine (ADDERALL) 20 mg tablet    buPROPion (WELLBUTRIN SR) 150 mg 12 hr tablet    diazepam (VALIUM) 5 mg tablet    diclofenac (VOLTAREN) 75 mg EC tablet    famotidine (PEPCID) 20 mg tablet    FLUoxetine (PROzac) 10 mg capsule    hydroxychloroquine (PLAQUENIL) 200 mg tablet    lamoTRIgine (LaMICtal) 150 MG tablet    levothyroxine 100 mcg tablet    OLANZapine-FLUoxetine (SYMBYAX) 3-25 MG per capsule    Plecanatide (Trulance) 3 MG TABS    predniSONE 10 mg tablet    topiramate (TOPAMAX) 50 MG tablet    cyclobenzaprine (FLEXERIL) 5 mg tablet    dexamethasone (DECADRON) 1 mg tablet    dexamethasone (DECADRON) 1 mg tablet    fexofenadine (ALLEGRA) 180 MG tablet    Ibuprofen 200 MG CAPS    ketorolac (TORADOL) 10 mg tablet    lamoTRIgine (LaMICtal) 100 mg tablet    levonorgestrel (KYLEENA) 19 5 MG intrauterine device    methocarbamol (ROBAXIN) 500 mg tablet    mupirocin (BACTROBAN) 2 % ointment    nitrofurantoin (MACROBID) 100 mg capsule    nitrofurantoin (MACRODANTIN) 50 mg capsule    polyethylene glycol (MIRALAX) 17 g packet    prochlorperazine (COMPAZINE) 10 mg tablet    Rimegepant Sulfate (Nurtec) 75 MG TBDP    SUMAtriptan (IMITREX) 100 mg tablet    Ubrogepant (UBRELVY) 100 MG tablet    Current Facility-Administered Medications:     sodium chloride 0 9 % infusion, 125 mL/hr, Intravenous, Continuous    Allergies Allergen Reactions    Oxcarbazepine Hives     Hives       Objective     There were no vitals taken for this visit  PHYSICAL EXAM    Gen: NAD  Head: NCAT  CV: RRR  CHEST: Clear  ABD: soft, NT/ND  EXT: no edema      ASSESSMENT/PLAN:  This is a 32y o  year old female here for EGD and colonoscopy, and she is stable and optimized for her procedure

## 2021-05-12 NOTE — ANESTHESIA PREPROCEDURE EVALUATION
Procedure:  COLONOSCOPY  EGD    Relevant Problems   CARDIO   (+) Mixed hyperlipidemia      ENDO   (+) Acquired hypothyroidism      /RENAL   (+) Renal calculi      MUSCULOSKELETAL   (+) Back pain of lumbar region with sciatica   (+) Cervical myofascial pain syndrome   (+) Other idiopathic scoliosis, thoracolumbar region   (+) Systemic lupus erythematosus (HCC)      NEURO/PSYCH   (+) Anxiety   (+) Cervical myofascial pain syndrome   (+) Cervicogenic headache   (+) Chronic pain disorder   (+) Paresthesias        Physical Exam    Airway    Mallampati score: II  TM Distance: <3 FB  Neck ROM: full     Dental       Cardiovascular  Rhythm: regular, Rate: normal,     Pulmonary  Breath sounds clear to auscultation,     Other Findings        Anesthesia Plan  ASA Score- 2     Anesthesia Type- IV sedation with anesthesia with ASA Monitors  Additional Monitors:   Airway Plan:           Plan Factors-Exercise tolerance (METS): >4 METS  Chart reviewed  Existing labs reviewed  Patient summary reviewed  Patient is not a current smoker  Patient not instructed to abstain from smoking on day of procedure  Patient did not smoke on day of surgery  Induction- intravenous  Postoperative Plan-     Informed Consent- Anesthetic plan and risks discussed with patient

## 2021-05-19 ENCOUNTER — TELEPHONE (OUTPATIENT)
Dept: NEUROLOGY | Facility: CLINIC | Age: 31
End: 2021-05-19

## 2021-05-19 NOTE — TELEPHONE ENCOUNTER
Pt called and states that in dec she had fmla forms completed by our office  they are now due for renewal   made her aware of fee and turn around time  she will fax forms to [phone]      Please call pt once forms are received to make payment and please call once form is completed and faxed  [phone] to leave detailed message

## 2021-05-20 ENCOUNTER — TELEPHONE (OUTPATIENT)
Dept: GASTROENTEROLOGY | Facility: CLINIC | Age: 31
End: 2021-05-20

## 2021-05-20 NOTE — TELEPHONE ENCOUNTER
----- Message from Jorge Barrera MD sent at 5/19/2021  1:32 PM EDT -----  Please call patient with results - biopsies normal/negative  Follow up outpatient office

## 2021-05-21 NOTE — TELEPHONE ENCOUNTER
Received FMLA forms  Dropped charges for $15 for update  LMOM for patient to call the office to make payment over the phone or in person  Forms scanned into chart

## 2021-05-26 NOTE — TELEPHONE ENCOUNTER
Patient returned call and payment was accepted over the phone for $15  Patient declined copy of receipt  Please call patient to let her know when forms are complete and have been faxed

## 2021-06-07 NOTE — TELEPHONE ENCOUNTER
Forms have been filled out, signed by Balwinder Paz, and faxed over to [place] [id number] Department  I will have the completed forms scanned into the patient's chart  Called patient and made her aware and confirmed her 06/11/21 appointment with Balwinder Paz

## 2021-06-11 ENCOUNTER — OFFICE VISIT (OUTPATIENT)
Dept: NEUROLOGY | Facility: CLINIC | Age: 31
End: 2021-06-11
Payer: COMMERCIAL

## 2021-06-11 VITALS
WEIGHT: 126 LBS | BODY MASS INDEX: 23.79 KG/M2 | TEMPERATURE: 97.5 F | SYSTOLIC BLOOD PRESSURE: 117 MMHG | HEIGHT: 61 IN | HEART RATE: 99 BPM | DIASTOLIC BLOOD PRESSURE: 73 MMHG

## 2021-06-11 DIAGNOSIS — G43.701 CHRONIC MIGRAINE WITHOUT AURA WITH STATUS MIGRAINOSUS, NOT INTRACTABLE: ICD-10-CM

## 2021-06-11 DIAGNOSIS — M79.18 CERVICAL MYOFASCIAL PAIN SYNDROME: Primary | ICD-10-CM

## 2021-06-11 DIAGNOSIS — G43.009 MIGRAINE WITHOUT AURA AND WITHOUT STATUS MIGRAINOSUS, NOT INTRACTABLE: ICD-10-CM

## 2021-06-11 PROCEDURE — 99214 OFFICE O/P EST MOD 30 MIN: CPT | Performed by: PHYSICIAN ASSISTANT

## 2021-06-11 PROCEDURE — 20553 NJX 1/MLT TRIGGER POINTS 3/>: CPT | Performed by: PHYSICIAN ASSISTANT

## 2021-06-11 RX ORDER — RIMEGEPANT SULFATE 75 MG/75MG
75 TABLET, ORALLY DISINTEGRATING ORAL EVERY OTHER DAY
Qty: 16 TABLET | Refills: 11 | Status: ON HOLD | OUTPATIENT
Start: 2021-06-11 | End: 2022-06-01

## 2021-06-11 RX ORDER — BUPIVACAINE HYDROCHLORIDE 2.5 MG/ML
8 INJECTION, SOLUTION EPIDURAL; INFILTRATION; INTRACAUDAL ONCE
Status: COMPLETED | OUTPATIENT
Start: 2021-06-11 | End: 2021-06-11

## 2021-06-11 RX ADMIN — BUPIVACAINE HYDROCHLORIDE 8 ML: 2.5 INJECTION, SOLUTION EPIDURAL; INFILTRATION; INTRACAUDAL at 13:22

## 2021-06-11 NOTE — PATIENT INSTRUCTIONS
PREVENTATIVE  Continue Botox every 3 months  Topamax 50 mg BID  Continue all your other medications prescribed  Cyclobenzaprine 5 mg at bedtime    ABORTIVE:  At onset of migraine, use Nurtec 75 mg  Limit 1 of in 24 hours  In addition you can try Prochlorperazine 10 mg to break your migraine, either alone or with imitrex  If repeat the Sumatriptan or still have lingering pain Toradol 10 mg at bedtime  If you wake up next day with a migraine, Decadron 1 mg    If this does not break your migraine, call us    Neck pain:   - We discussed the role of neck pathology and poor posture, with straightening of the normal cervical lordosis, in headaches  We discussed how tightening of the neck muscles can irritate the nerves in the occipital region of her head and cause or worsen head pain  We also discussed and demonstrated neck strengthening and relaxation exercises, as well as giving written instructions on these exercises  - We talked about the importance of good posture for improving shoulder, neck, and head pain  The patient was given visualization exercises for correcting posture, which patient will practice at home  If this simple exercise does not help improve the posture, we will consider formal physical therapy in the future  Medication overuse headaches:   - We discussed medication overuse headache [facility]) and how to avoid it in the future  It was explained that all analgesics have the potential to cause medication overuse headache [facility]) and analgesic overuse can negate the effectiveness of headache preventive measures  After successful [address] treatment, preventive medications for an underlying primary headache disorder have a greater chance for success  Avoid medications with narcotics, barbiturates, or caffeine in them as these can cause rebound headaches after very few doses and can interfere with other headache medicine efficacy   Taking any analgesics for more than 2-3 days a week can cause medication overuse headache  Reproductive age women: Should take folic acid daily when taking anti-seizure drugs especially Depakote  [place] Prescription Drug Monitoring Program report was reviewed and was appropriate      Headache management instructions  - When patient has a moderate to severe headache, they should seek rest, initiate relaxation and apply cold compresses to the head  - Maintain regular sleep schedule  Adults need at least 7-8 hours of uninterrupted a night  - Limit over the counter medications such as Tylenol, Ibuprofen, Aleve, Excedrin  (No more than 3 times a week)  - Maintain headache diary  We discussed an TOM for a smart phone is "Migraine celso"  - Limit caffeine to 1-2 cups 8 to 16 oz a day or less  - Avoid dietary trigger  (aged cheese, peanuts, MSG, aspartame and nitrates)  - Patient is to have regular frequent meals to prevent headache onset  - Please drink at least 64 ounces of water a day to help remain hydrated  Please call with any questions or concerns   Office number is [phone]

## 2021-06-11 NOTE — ASSESSMENT & PLAN NOTE
PREVENTATIVE  Continue Botox every 3 months  Topamax 50 mg BID  Continue all your other medications prescribed  Cyclobenzaprine 5 mg at bedtime  nurtec 75 mg every other day    ABORTIVE:  At onset of migraine, use Nurtec 75 mg  Limit 1 of in 24 hours      In addition you can try Prochlorperazine 10 mg to break your migraine, either alone or with imitrex  If repeat the Sumatriptan or still have lingering pain Toradol 10 mg at bedtime  If you wake up next day with a migraine, Decadron 1 mg    If this does not break your migraine, call us

## 2021-06-14 ENCOUNTER — TELEPHONE (OUTPATIENT)
Dept: NEUROLOGY | Facility: CLINIC | Age: 31
End: 2021-06-14

## 2021-06-14 NOTE — TELEPHONE ENCOUNTER
natty from [facility]   states that script was for 16 for 30 for preventative,    but PA was only approved for 8 per 30 for abortive    [facility]  [id number]  she will double check and see if med is denied for preventative will pt still get free drug for preventative dose  she will call us back      [facility] completed on CMM  Key: [id number]

## 2021-06-15 NOTE — TELEPHONE ENCOUNTER
nurtec denied #16/30 days is more than the maximum allowed for your indication which is nurtec #40/90 days    I spoke to [facility] and was advised patient qualifies for program and delivery of nurtec was already arranged

## 2021-06-18 ENCOUNTER — APPOINTMENT (OUTPATIENT)
Dept: LAB | Facility: MEDICAL CENTER | Age: 31
End: 2021-06-18

## 2021-06-18 ENCOUNTER — OFFICE VISIT (OUTPATIENT)
Dept: RHEUMATOLOGY | Facility: CLINIC | Age: 31
End: 2021-06-18
Payer: COMMERCIAL

## 2021-06-18 VITALS
WEIGHT: 126 LBS | TEMPERATURE: 98 F | SYSTOLIC BLOOD PRESSURE: 119 MMHG | BODY MASS INDEX: 23.79 KG/M2 | HEART RATE: 91 BPM | HEIGHT: 61 IN | DIASTOLIC BLOOD PRESSURE: 79 MMHG

## 2021-06-18 DIAGNOSIS — Z79.899 HIGH RISK MEDICATION USE: ICD-10-CM

## 2021-06-18 DIAGNOSIS — M32.19 SYSTEMIC LUPUS ERYTHEMATOSUS WITH OTHER ORGAN INVOLVEMENT, UNSPECIFIED SLE TYPE (HCC): Primary | ICD-10-CM

## 2021-06-18 DIAGNOSIS — M54.40 BACK PAIN OF LUMBAR REGION WITH SCIATICA: ICD-10-CM

## 2021-06-18 DIAGNOSIS — Z00.8 HEALTH EXAMINATION IN POPULATION SURVEY: ICD-10-CM

## 2021-06-18 DIAGNOSIS — M35.9 UNDIFFERENTIATED CONNECTIVE TISSUE DISEASE (HCC): ICD-10-CM

## 2021-06-18 LAB
CHOLEST SERPL-MCNC: 231 MG/DL (ref 50–200)
EST. AVERAGE GLUCOSE BLD GHB EST-MCNC: 91 MG/DL
HBA1C MFR BLD: 4.8 %
HDLC SERPL-MCNC: 60 MG/DL
LDLC SERPL CALC-MCNC: 161 MG/DL (ref 0–100)
NONHDLC SERPL-MCNC: 171 MG/DL
TRIGL SERPL-MCNC: 51 MG/DL

## 2021-06-18 PROCEDURE — 99214 OFFICE O/P EST MOD 30 MIN: CPT | Performed by: INTERNAL MEDICINE

## 2021-06-18 PROCEDURE — 83036 HEMOGLOBIN GLYCOSYLATED A1C: CPT

## 2021-06-18 PROCEDURE — 36415 COLL VENOUS BLD VENIPUNCTURE: CPT

## 2021-06-18 PROCEDURE — 80061 LIPID PANEL: CPT

## 2021-06-18 RX ORDER — HYDROXYCHLOROQUINE SULFATE 200 MG/1
300 TABLET, FILM COATED ORAL DAILY
Qty: 135 TABLET | Refills: 1 | Status: SHIPPED | OUTPATIENT
Start: 2021-06-18 | End: 2021-09-20 | Stop reason: SDUPTHER

## 2021-06-18 RX ORDER — DICLOFENAC SODIUM 75 MG/1
75 TABLET, DELAYED RELEASE ORAL 2 TIMES DAILY
Qty: 180 TABLET | Refills: 1 | Status: SHIPPED | OUTPATIENT
Start: 2021-06-18 | End: 2021-10-22 | Stop reason: ALTCHOICE

## 2021-06-18 RX ORDER — FAMOTIDINE 20 MG/1
20 TABLET, FILM COATED ORAL DAILY
Qty: 90 TABLET | Refills: 1 | Status: SHIPPED | OUTPATIENT
Start: 2021-06-18 | End: 2021-09-20 | Stop reason: SDUPTHER

## 2021-06-18 NOTE — PATIENT INSTRUCTIONS
Continue famotidine dez  Continue diclofenac twice a day as needed for joint pain  Continue one and a half tabs daily hydroxychloroquine and regular eye exams  Take 1,000-2,000 units of Vit  D daily  Exercise daily    Return to clinic in 4 months    Lupus Erythematosus   WHAT YOU NEED TO KNOW:   What is lupus? Lupus is an autoimmune inflammatory disease  This means that your immune system starts to attack your body instead of harmful germs  It is also called systemic lupus erythematosus  Lupus is a lifelong disease that affects all parts of your body  Lupus has active and quiet periods  The active periods, also called flares, are when you have symptoms  The quiet periods, or remission, are when you have few or no symptoms  A remission period may last months or years, or you may not have remission periods at all  What increases my risk for lupus? The cause of lupus is unknown  You are at increased risk if you are female, take hormones, or have a family member with lupus  Certain medicines, such as hydralazine and minocycline, can increase your risk for lupus  Ask your healthcare provider for more information about what increases your risk for lupus  What are the signs and symptoms of lupus? · Fever over 100°F (38°C)    · Tiredness, weight loss, or headache    · Rash shaped like butterfly wings    · Sensitivity to sunlight    · Hair loss    · Mouth or nose sores    · Painful joints    · Chest pain or cough when you take a deep breath    · Abdominal pain, nausea, or vomiting    How is lupus diagnosed? · Blood tests:  Your blood will be tested for infection, inflammation, or anemia (low red blood cells)  · Urine tests:  Your urine will be tested for protein or blood  · X-rays: This is a picture of your joints or chest to check for infection or extra fluid  · Biopsy:  Tissue may be taken from your skin, muscle, or kidney to check for the cause of your lupus  How is lupus treated?   There is no cure for lupus  Lupus may be triggered by stress, ultraviolet light, or an infection, such as a cold  It can also be triggered by cigarette smoke or foods you eat  The following will help control your symptoms:  · Antimalarial medicine: This is used to relieve your joint and skin symptoms of lupus, such as rash and joint pain  · Steroids: These decrease inflammation  They may be given as a pill, IV, or ointment  · NSAIDs:  These decrease swelling, pain, and fever  NSAIDs are available without a doctor's order  Ask your healthcare provider which medicine is right for you  Ask how much to take and when to take it  Take as directed  NSAIDs can cause stomach bleeding and kidney problems if not taken correctly  · Immunosuppressive medicine: This is used to slow down your immune system  This will help your immune system not attack your body  · Cytotoxic medicine: This is used to decrease inflammation in muscles or joints  It also slows down your immune system  What are the risks of lupus? · You may be so tired that you cannot work at times  Your risk for a serious infection is increased  You may develop vision loss  You may become depressed or anxious  Your fingers may turn pale or blue when they are cold  This is called Reynaud syndrome  You may become forgetful or have trouble concentrating  You may develop headaches, vision problems, or have a seizure  · You may develop kidney disease or kidney failure  You may have high blood pressure or narrowing of your arteries  This can lead to heart disease or heart failure  You may have bleeding problems, such as anemia  You may get a blood clot in your leg  The clot may travel to your heart or brain and cause life-threatening problems, such as a heart attack or stroke  How can I manage my symptoms? · Rest:  Rest when you feel it is needed  Slowly start to do more each day  Return to your daily activities as directed       · Protect your skin from UV light:  Sunlight can make your lupus symptoms worse  Avoid the sun between 10 am and 4 pm, when the rays are strongest  Apply sunscreen with a SPF of 30 or more every 2 hours when you are outside  Do this even on cloudy days  Wear pants and long sleeves to cover your body  A hat with a wide brim can protect your face, head, and neck  · Heat:  Heat helps decrease joint pain or swelling  Apply heat on the painful joint for 20 to 30 minutes every 2 hours for as many days as directed  · Ice:  Ice helps decrease swelling and pain  Ice may also help prevent tissue damage  Use an ice pack, or put crushed ice in a plastic bag  Cover it with a towel and place it on the painful area for 15 to 20 minutes every hour or as directed  · Avoid others who are sick:  You are at increased risk of a severe infection  · Treat flares quickly: This will help prevent serious illness  How can I help prevent a lupus flare? · Eat healthy foods:  Healthy foods include fruits, vegetables, whole-grain breads, low-fat dairy products, beans, lean meats, and fish  Ask if you need to be on a special diet  · Exercise: This will help decrease your symptoms and prevent depression  Ask your healthcare provider about the best exercise plan for you  · Maintain a healthy weight:  Ask your healthcare provider how much you should weigh  Ask him to help you create a weight loss plan if you are overweight  · Do not smoke: If you smoke, it is never too late to quit  Ask for information about how to stop smoking if you need help  · Manage your stress:  Stress may slow healing and lead to illness  Learn ways to control stress, such as relaxation, deep breathing, and music  Talk to someone about things that upset you  Where can I find support and more information?    · [facility] of [address] , [place] [address] , [address]  Phone: María Median  Phone: 0- 655 - [phone]  Web Address: [employer]  org    · [employer] [facility] [employer] and [address] , [address]  Phone: 6- 718 - [phone]  Phone: 4- 457 - [phone]  Web Address: [employer] gov  When should I contact my healthcare provider? · You have a flare of your lupus symptoms  · You have a fever or headache  · You feel like you are starting to get sick  · You start to urinate less than usual     · You are bleeding from your nose or gums  · You bruise easily  · You have questions or concerns about your condition or care  When should I seek immediate care or call 911? · You have blood in your urine, bowel movement, or vomit  · You have severe abdominal pain  · You are confused or feel dizzy or faint  · You have numbness or weakness of your face or limbs, or have trouble seeing or speaking  · You have a seizure  · You have new, sudden vision changes  · You have trouble breathing  · You have chest pain, pressure, or discomfort that may spread to your arms, jaw, or back  · Your leg feels warm, tender, and painful  It may look swollen and red  · You suddenly feel lightheaded and short of breath  · You have chest pain when you take a deep breath or cough  · You cough up blood  CARE AGREEMENT:   You have the right to help plan your care  Learn about your health condition and how it may be treated  Discuss treatment options with your healthcare providers to decide what care you want to receive  You always have the right to refuse treatment  The above information is an  only  It is not intended as medical advice for individual conditions or treatments  Talk to your doctor, nurse or pharmacist before following any medical regimen to see if it is safe and effective for you    © Copyright [employer] 2020 Information is for End User's use only and may not be sold, redistributed or otherwise used for commercial purposes   All illustrations and images included in CareNotes® are the copyrighted property of [employer]  or [facility] Vishal Macias

## 2021-06-18 NOTE — PROGRESS NOTES
Assessment and Plan: Duwayne Ahumada is a 32 y o   female who presents for follow-up of lupus (+DEBBY via IFA of 1:640 in speckled pattern, photosensitivity, arthralgia, myalgia, fatigue, mouth/nasal sores, sicca symptoms, Raynaud's symptoms, headaches, hair loss, fevers, and malar erythema)  Has a lot of fatigue; asked patient to exercise daily and take daily Vit  D 1,000-2,000 units a day  Continue famotidine daily for GI prophylaxis while on regular NSAIDs  Continue diclofenac twice a day as needed for joint pain  Continue 300mg po daily hydroxychloroquine and regular eye exams; last eye exam was in Feb 2021 and was normal    Plan:  Diagnoses and all orders for this visit:    Systemic lupus erythematosus with other organ involvement, unspecified SLE type ([facility] [address] )    Undifferentiated connective tissue disease ([facility] [address] )  -     famotidine (PEPCID) 20 mg tablet; Take 1 tablet (20 mg total) by mouth daily  -     hydroxychloroquine (PLAQUENIL) 200 mg tablet; Take 1 5 tablets (300 mg total) by mouth daily    Back pain of lumbar region with sciatica  -     diclofenac (VOLTAREN) 75 mg EC tablet; Take 1 tablet (75 mg total) by mouth 2 (two) times a day    High risk medication use    High risk medication use - Benefits and risks of hydroxychloroquine, including but not limited to retinal toxicity, corneal deposits, gastrointestinal side effects, and headaches were previously discussed with the patient  She is aware of the need for a regular eye exam to monitor for ocular toxicity while on this medication  Follow-up plan: Return to clinic in 4 months        Rheumatic Disease Summary  Maria Isabel Corbin is a 32 y o  female who originally presented on 8/28/20 as a Rheumatology consult referred by her PCP Sherley Hines PA-C for evaluation of possible autoimmune disease  Patient admitted to fevers, photosensitivity, arthralgia, myalgia, fatigue, mouth/nasal sores, sicca symptoms, Raynaud's symptoms, headaches, and malar erythema  Extensive autoimmune disease lab and x-ray work-up ordered and returned unremarkable  Prescribed celecoxib 100mg po bid prn joint pain  Can also prescribe prednisone course to see if symptoms improve; if they do, then there likely is an inflammatory process going on  Next clinic visit was 10/9/20  It was felt that she had UCTD at the time  Also on differential was seronegative RA with sicca symptoms  Muscle enzymes and ACE level ordered to evaluate for myositis and sarcoidosis returned normal  Started patient on the DMARD hydroxychloroquine 200mg po daily; she was made aware to get regular eye exams while on this medication  She was to continue celecoxib 200mg po bid prn for joint pain  Put patient on a slower prednisone taper starting at 20mg po daily and to decrease by 2 5mg increments every week until off     3/12/21: Patient has developed hair loss and low-grade fevers in addition to her regular symptoms  Increased patient's HCQ to 300mg po daily  Started her on naproxen 500mg po bid prn for joint pain  She can take famotidine daily if she plans on taking naproxen daily  Her lupus activity labs returned unremarkable, but an DEBBY with IFA was also ordered this visit, and returned positive at 1:640 in a speckled pattern, so she likely does have SLE  HPI  Lavinia Kapoor is a 32 y o   female who presents for follow-up of her newly confirmed SLE  Last clinic visit was 3/12/21  Patient feels really well today, though complains of fatigue  Her hair loss has improved, and her fevers have resolved  Famotidine is really helping her tolerate diclofenac, which she takes for her joint pain  She was on a prednisone course in April for a lupus flare, which significantly helped      The following portions of the patient's history were reviewed and updated as appropriate: allergies, current medications, past family history, past medical history, past social history, past surgical history and problem list     Review of Systems:   Review of Systems   Constitutional: Positive for fatigue  Negative for chills, fever and unexpected weight change  HENT: Negative for mouth sores and trouble swallowing  Eyes: Negative for pain and visual disturbance  Respiratory: Negative for cough and shortness of breath  Cardiovascular: Negative for chest pain and leg swelling  Gastrointestinal: Negative for constipation and diarrhea  Endocrine: Positive for cold intolerance  Genitourinary: Positive for dysuria and frequency  Musculoskeletal: Positive for arthralgias, back pain, myalgias and neck pain  Negative for joint swelling  Skin: Negative for color change and rash  Neurological: Positive for dizziness and headaches  Negative for weakness  Hematological: Negative for adenopathy  Bruises/bleeds easily  Psychiatric/Behavioral: Negative for sleep disturbance         Home Medications:    Current Outpatient Medications:     amphetamine-dextroamphetamine (ADDERALL) 20 mg tablet, Take 20 mg by mouth daily , Disp: , Rfl: 0    buPROPion (WELLBUTRIN SR) 150 mg 12 hr tablet, Take 150 mg by mouth 2 (two) times a day , Disp: , Rfl:     cyclobenzaprine (FLEXERIL) 5 mg tablet, Take 2 tablets (10 mg total) by mouth daily at bedtime (Patient taking differently: Take 10 mg by mouth as needed ), Disp: 180 tablet, Rfl: 3    dexamethasone (DECADRON) 1 mg tablet, Take 1 tablet (1 mg total) by mouth daily with breakfast (Patient taking differently: Take 1 mg by mouth as needed ), Disp: 5 tablet, Rfl: 0    dexamethasone (DECADRON) 1 mg tablet, Take 1 tablet (1 mg total) by mouth daily with breakfast, Disp: 10 tablet, Rfl: 0    diazepam (VALIUM) 5 mg tablet, Take 5 mg by mouth every 6 (six) hours as needed , Disp: , Rfl:     diclofenac (VOLTAREN) 75 mg EC tablet, Take 1 tablet (75 mg total) by mouth 2 (two) times a day, Disp: 180 tablet, Rfl: 1    famotidine (PEPCID) 20 mg tablet, Take 1 tablet (20 mg total) by mouth daily, Disp: 90 tablet, Rfl: 1    fexofenadine (ALLEGRA) 180 MG tablet, Take 180 mg by mouth daily, Disp: , Rfl:     FLUoxetine (PROzac) 10 mg capsule, Take 10 mg by mouth daily , Disp: , Rfl:     hydroxychloroquine (PLAQUENIL) 200 mg tablet, Take 1 5 tablets (300 mg total) by mouth daily, Disp: 135 tablet, Rfl: 1    Ibuprofen 200 MG CAPS, Take 800 mg by mouth every 6 (six) hours as needed, Disp: , Rfl:     ketorolac (TORADOL) 10 mg tablet, Take 1 tablet (10 mg total) by mouth every 6 (six) hours as needed for moderate pain, Disp: 10 tablet, Rfl: 3    lamoTRIgine (LaMICtal) 150 MG tablet, Take 150 mg by mouth daily , Disp: , Rfl:     levonorgestrel (KYLEENA) 19 5 MG intrauterine device, 1 Intra Uterine Device by Intrauterine route once, Disp: , Rfl:     levothyroxine 100 mcg tablet, TAKE ONE TABLET BY MOUTH ONCE DAILY, Disp: 90 tablet, Rfl: 3    methocarbamol (ROBAXIN) 500 mg tablet, Take 1 tablet (500 mg total) by mouth 3 (three) times a day as needed for muscle spasms, Disp: 30 tablet, Rfl: 2    mupirocin (BACTROBAN) 2 % ointment, Apply topically 3 (three) times a day (Patient taking differently: Apply 1 application topically 3 (three) times a day as needed ), Disp: 30 g, Rfl: 5    OLANZapine-FLUoxetine (SYMBYAX) 3-25 MG per capsule, Take 1 capsule by mouth every evening, Disp: , Rfl:     Plecanatide (Trulance) 3 MG TABS, Take 1 tablet by mouth daily, Disp: 30 tablet, Rfl: 5    polyethylene glycol (MIRALAX) 17 g packet, Take 17 g by mouth daily, Disp: , Rfl:     prochlorperazine (COMPAZINE) 10 mg tablet, Take 1 tablet (10 mg total) by mouth every 6 (six) hours as needed (migraine), Disp: 10 tablet, Rfl: 0    Rimegepant Sulfate (Nurtec) 75 MG TBDP, Take 75 mg by mouth as needed (migraine), Disp: 8 tablet, Rfl: 3    SUMAtriptan (IMITREX) 100 mg tablet, TAKE ONE TABLET BY MOUTH ONCE AS NEEDED FOR MIGRAINE FOR UP TO 1 DOSE  MAX 2 TABS/24 HOURS   MAX 9 TABS/MONTH , Disp: 27 tablet, Rfl: 0    topiramate (TOPAMAX) 50 MG tablet, 1 tab in the am and 2 tabs at bedtime, Disp: 270 tablet, Rfl: 3    Ubrogepant (UBRELVY) 100 MG tablet, Take 1 tablet (100 mg) one time as needed for migraine  May repeat one additional tablet (100 mg) at least two hours after the first dose  Do not use more than two doses per day, or for more than 10 days per month , Disp: 10 tablet, Rfl: 3    nitrofurantoin (MACROBID) 100 mg capsule, Take 1 capsule (100 mg total) by mouth 2 (two) times a day (Patient not taking: Reported on 6/18/2021), Disp: 7 capsule, Rfl: 0    nitrofurantoin (MACRODANTIN) 50 mg capsule, Take 1 capsule (50 mg total) by mouth daily at bedtime as needed (after intercourse) (Patient not taking: Reported on 4/16/2021), Disp: 14 capsule, Rfl: 1    Rimegepant Sulfate (Nurtec) 75 MG TBDP, Take 75 mg by mouth every other day (Patient not taking: Reported on 6/18/2021), Disp: 16 tablet, Rfl: 11    Objective:    Vitals:    06/18/21 1517   BP: 119/79   Pulse: 91   Temp: 98 °F (36 7 °C)   Weight: 57 2 kg (126 lb)   Height: 5' 1" (1 549 m)       Physical Exam  Constitutional:       General: She is not in acute distress  Appearance: She is well-developed  HENT:      Head: Normocephalic and atraumatic  Eyes:      General: Lids are normal  No scleral icterus  Conjunctiva/sclera: Conjunctivae normal    Cardiovascular:      Rate and Rhythm: Normal rate and regular rhythm  Heart sounds: S1 normal and S2 normal  No murmur heard  No friction rub  Pulmonary:      Effort: Pulmonary effort is normal  No tachypnea or respiratory distress  Breath sounds: Normal breath sounds  No wheezing, rhonchi or rales  Musculoskeletal:         General: No tenderness  Cervical back: Neck supple  No muscular tenderness  Skin:     General: Skin is warm and dry  Findings: Erythema present  No rash  Nails: There is no clubbing  Comments: Malar erythema   Neurological:      Mental Status: She is alert        Sensory: No sensory deficit  Psychiatric:         Behavior: Behavior normal  Behavior is cooperative  Reviewed labs and imaging  Imaging:   Bilateral Hand and SI joint x-rays 8/28/20: unremarkable    Labs:   Appointment on 06/18/2021   Component Date Value Ref Range Status    Hemoglobin A1C 06/18/2021 4 8  Normal 3 8-5 6%; PreDiabetic 5 7-6 4%; Diabetic >=6 5%; Glycemic control for adults with diabetes <7 0% % Final    EAG 06/18/2021 91  mg/dl Final    Cholesterol 06/18/2021 231* 50 - 200 mg/dL Final    Cholesterol:       Desirable         <200 mg/dl       Borderline         200-239 mg/dl       High              >239           Triglycerides 06/18/2021 51  <=150 mg/dL Final    Triglyceride:     Normal          <150 mg/dl     Borderline High 150-199 mg/dl     High            200-499 mg/dl        Very High       >499 mg/dl    Specimen collection should occur prior to N-Acetylcysteine or Metamizole administration due to the potential for falsely depressed results   HDL, Direct 06/18/2021 60  >=40 mg/dL Final    HDL Cholesterol:       Low     <41 mg/dL  Specimen collection should occur prior to Metamizole administration due to the potential for falsley depressed results   LDL Calculated 06/18/2021 161* 0 - 100 mg/dL Final    LDL Cholesterol:     Optimal           <100 mg/dl     Near Optimal      100-129 mg/dl     Above Optimal       Borderline High 130-159 mg/dl       High            160-189 mg/dl       Very High       >189 mg/dl         This screening LDL is a calculated result  It does not have the accuracy of the Direct Measured LDL in the monitoring of patients with hyperlipidemia and/or statin therapy  Direct Measure LDL (DZZ586) must be ordered separately in these patients      Non-HDL-Chol (CHOL-HDL) 06/18/2021 171  mg/dl Final   Hospital Outpatient Visit on 05/12/2021   Component Date Value Ref Range Status    EXT Preg Test, Ur 05/12/2021 Negative  Negative Final    Control 05/12/2021 Valid  Valid Final  Case Report 05/12/2021    Final                    Value:Surgical Pathology Report                         Case: [id number]                                   Authorizing Provider:  Arnav Graves MD          Collected:           05/12/2021 1025              Ordering Location:     [facility]        Received:            05/12/2021 [facility] [facility]                                                     Pathologist:           Jose Augustin MD                                                          Specimens:   A) - Duodenum, bx, R/O celiac                                                                       B) - Stomach, gastric bx, R/O H  pylori                                                             C) - Esophagus, bx, R/O E O E  D) - Colon, random colon bx, R/O microscopic colitis                                       Final Diagnosis 05/12/2021    Final                    Value: This result contains rich text formatting which cannot be displayed here   Additional Information 05/12/2021    Final                    Value: This result contains rich text formatting which cannot be displayed here  Kailey Rein Description 05/12/2021    Final                    Value: This result contains rich text formatting which cannot be displayed here     Office Visit on 03/12/2021   Component Date Value Ref Range Status    WBC 03/12/2021 6 42  4 31 - 10 16 Thousand/uL Final    RBC 03/12/2021 4 53  3 81 - 5 12 Million/uL Final    Hemoglobin 03/12/2021 14 4  11 5 - 15 4 g/dL Final    Hematocrit 03/12/2021 40 4  34 8 - 46 1 % Final    MCV 03/12/2021 89  82 - 98 fL Final    MCH 03/12/2021 31 8  26 8 - 34 3 pg Final    MCHC 03/12/2021 35 6  31 4 - 37 4 g/dL Final    RDW 03/12/2021 11 2* 11 6 - 15 1 % Final    MPV 03/12/2021 12 4  8 9 - 12 7 fL Final    Platelets 27/22/1638 254  149 - 390 Thousands/uL Final    nRBC 03/12/2021 0  /100 WBCs Final    Neutrophils Relative 03/12/2021 50  43 - 75 % Final    Immat GRANS % 03/12/2021 0  0 - 2 % Final    Lymphocytes Relative 03/12/2021 39  14 - 44 % Final    Monocytes Relative 03/12/2021 6  4 - 12 % Final    Eosinophils Relative 03/12/2021 4  0 - 6 % Final    Basophils Relative 03/12/2021 1  0 - 1 % Final    Neutrophils Absolute 03/12/2021 3 16  1 85 - 7 62 Thousands/µL Final    Immature Grans Absolute 03/12/2021 0 02  0 00 - 0 20 Thousand/uL Final    Lymphocytes Absolute 03/12/2021 2 53  0 60 - 4 47 Thousands/µL Final    Monocytes Absolute 03/12/2021 0 39  0 17 - 1 22 Thousand/µL Final    Eosinophils Absolute 03/12/2021 0 23  0 00 - 0 61 Thousand/µL Final    Basophils Absolute 03/12/2021 0 09  0 00 - 0 10 Thousands/µL Final    Sodium 03/12/2021 142  136 - 145 mmol/L Final    Potassium 03/12/2021 3 7  3 5 - 5 3 mmol/L Final    Chloride 03/12/2021 112* 100 - 108 mmol/L Final    CO2 03/12/2021 23  21 - 32 mmol/L Final    ANION GAP 03/12/2021 7  4 - 13 mmol/L Final    BUN 03/12/2021 14  5 - 25 mg/dL Final    Creatinine 03/12/2021 1 06  0 60 - 1 30 mg/dL Final    Standardized to IDMS reference method    Glucose 03/12/2021 86  65 - 140 mg/dL Final    If the patient is fasting, the ADA then defines impaired fasting glucose as > 100 mg/dL and diabetes as > or equal to 123 mg/dL  Specimen collection should occur prior to Sulfasalazine administration due to the potential for falsely depressed results  Specimen collection should occur prior to Sulfapyridine administration due to the potential for falsely elevated results   Calcium 03/12/2021 9 0  8 3 - 10 1 mg/dL Final    AST 03/12/2021 8  5 - 45 U/L Final    Specimen collection should occur prior to Sulfasalazine administration due to the potential for falsely depressed results       ALT 03/12/2021 21  12 - 78 U/L Final    Specimen collection should occur prior to Sulfasalazine and/or Sulfapyridine administration due to the potential for falsely depressed results   Alkaline Phosphatase 03/12/2021 55  46 - 116 U/L Final    Total Protein 03/12/2021 7 5  6 4 - 8 2 g/dL Final    Albumin 03/12/2021 4 3  3 5 - 5 0 g/dL Final    Total Bilirubin 03/12/2021 0 37  0 20 - 1 00 mg/dL Final    Use of this assay is not recommended for patients undergoing treatment with eltrombopag due to the potential for falsely elevated results      eGFR 03/12/2021 70  ml/min/1 73sq m Final    CRP 03/12/2021 <3 0  <3 0 mg/L Final    Sed Rate 03/12/2021 1  0 - 19 mm/hour Final    Creatinine, Ur 03/12/2021 155 0  mg/dL Final    Protein Urine Random 03/12/2021 18  mg/dL Final    Prot/Creat Ratio, Ur 03/12/2021 0 12* 0 00 - 0 10 Final    Clarity, UA 03/12/2021 Clear   Final    Color, UA 03/12/2021 Yellow   Final    Specific Gravity, UA 03/12/2021 1 020  1 003 - 1 030 Final    pH, UA 03/12/2021 6 5  4 5, 5 0, 5 5, 6 0, 6 5, 7 0, 7 5, 8 0 Final    Glucose, UA 03/12/2021 Negative  Negative mg/dl Final    Ketones, UA 03/12/2021 Negative  Negative mg/dl Final    Blood, UA 03/12/2021 Negative  Negative Final    Protein, UA 03/12/2021 Negative  Negative mg/dl Final    Nitrite, UA 03/12/2021 Negative  Negative Final    Bilirubin, UA 03/12/2021 Negative  Negative Final    Urobilinogen, UA 03/12/2021 0 2  0 2, 1 0 E U /dl E U /dl Final    Leukocytes, UA 03/12/2021 Negative  Negative Final    WBC, UA 03/12/2021 None Seen  None Seen, 2-4 /hpf Final    RBC, UA 03/12/2021 None Seen  None Seen, 2-4 /hpf Final    Hyaline Casts, UA 03/12/2021 None Seen  None Seen /lpf Final    Bacteria, UA 03/12/2021 None Seen  None Seen, Occasional /hpf Final    Epithelial Cells 03/12/2021 None Seen  None Seen, Occasional /hpf Final    C4, COMPLEMENT 03/12/2021 26 0  10 0 - 40 0 mg/dL Final    C3 Complement 03/12/2021 102 0  90 0 - 180 0 mg/dL Final    ds DNA Ab 03/12/2021 1  0 - 9 IU/mL Final Negative      <5                                     Equivocal  5 - 9                                     Positive      >9    Miscellaneous Lab Test Result 03/12/2021 SEE WRITTEN REPORT   Final   Lab on 02/16/2021   Component Date Value Ref Range Status    TSH 3RD GENERATON 02/16/2021 3 330  0 358 - 3 740 uIU/mL Final    The recommended reference ranges for TSH during pregnancy are as follows:   First trimester 0 1 to 2 5 uIU/mL   Second trimester  0 2 to 3 0 uIU/mL   Third trimester 0 3 to 3 0 uIU/m    Note: Normal ranges may not apply to patients who are transgender, non-binary, or whose legal sex, sex at birth, and gender identity differ   Vit D, 25-Hydroxy 02/16/2021 36 1  30 0 - 100 0 ng/mL Final   Annual Exam on 11/03/2020   Component Date Value Ref Range Status    Case Report 11/03/2020    Final                    Value:Gynecologic Cytology Report                       Case: [id number]                                  Authorizing Provider:  Leah Lindsey MD  Collected:           11/03/2020 0750              [facility]     Received:            11/03/2020 [address]                                                                First Screen:          Anny Pel, CT                                                    Specimen:    LIQUID-BASED PAP, SCREENING, Cervix, Endocervical                                          Primary Interpretation 11/03/2020 Negative for intraepithelial lesion or malignancy   Final    Specimen Adequacy 11/03/2020 Satisfactory for evaluation  Absence of endocervical/transformation zone component  Final    Additional Information 11/03/2020    Final                    Value: This result contains rich text formatting which cannot be displayed here      LMP 11/03/2020 10/30/2020 Final    HPV Other HR 11/03/2020 Negative  Negative Final    HPV types: 65,01,78,55,89,03,60,82,88,74,65 and 68 DNA are undetectable or below the pre-set threshold      HPV16 11/03/2020 Negative  Negative Final    HPV18 11/03/2020 Negative  Negative Final   Office Visit on 10/28/2020   Component Date Value Ref Range Status    LEUKOCYTE ESTERASE,UA 10/28/2020 trace   Final    BLOOD,UA 10/28/2020 trace   Final     COLOR,UA 10/28/2020 yellow   Final    CLARITY,UA 10/28/2020 cloudy   Final    Clarity, UA 10/28/2020 Clear   Final    Color, UA 10/28/2020 Yellow   Final    Specific [place], UA 10/28/2020 1 023  1 003 - 1 030 Final    pH, UA 10/28/2020 6 0  4 5, 5 0, 5 5, 6 0, 6 5, 7 0, 7 5, 8 0 Final    Glucose, UA 10/28/2020 Negative  Negative mg/dl Final    Ketones, UA 10/28/2020 Negative  Negative mg/dl Final    Blood, UA 10/28/2020 Negative  Negative Final    Protein, UA 10/28/2020 Negative  Negative mg/dl Final    Nitrite, UA 10/28/2020 Negative  Negative Final    Bilirubin, UA 10/28/2020 Negative  Negative Final    Urobilinogen, UA 10/28/2020 0 2  0 2, 1 0 E U /dl E U /dl Final    Leukocytes, UA 10/28/2020 Negative  Negative Final    WBC, UA 10/28/2020 2-4  None Seen, 2-4 /hpf Final    RBC, UA 10/28/2020 4-10* None Seen, 2-4 /hpf Final    Hyaline Casts, UA 10/28/2020 5-10* None Seen /lpf Final    Bacteria, UA 10/28/2020 Occasional  None Seen, Occasional /hpf Final    Epithelial Cells 10/28/2020 None Seen  None Seen, Occasional /hpf Final    Urine Culture 10/28/2020 <10,000 cfu/ml    Final    Mixed Contaminants X2   Office Visit on 10/09/2020   Component Date Value Ref Range Status    Total CK 10/09/2020 129  26 - 192 U/L Final    Aldolase 10/09/2020 4 2  3 3 - 10 3 U/L Final    Angio Convert Enzyme 10/09/2020 24  14 - 82 U/L Final   Appointment on 08/28/2020   Component Date Value Ref Range Status    DEBBY 08/28/2020 Negative  Negative Final   Office Visit on 08/28/2020   Component Date Value Ref Range Status    Sed Rate 08/28/2020 5  0 - 19 mm/hour Final    CRP 08/28/2020 <3 0  <3 0 mg/L Final    Anti-Centromere B Antibodies 08/28/2020 <0 2  0 0 - 0 9 AI Final    Cyclic Citrullin Peptide Ab 08/28/2020 7  0 - 19 units Final                              Negative               <20                            Weak positive      20 - 39                            Moderate positive  40 - 59                            Strong positive        >59    HLA B27 08/28/2020 Negative   Final    HLA-B*27 Negative  B27 allele interpretation for all loci based on IMGT/HLA  database version 3 38  This test was developed and its performance characteristics  determined by [employer]   It has not been cleared or approved  by the Food and Drug Administration  [facility] CLIA ID Number [id number]  This test was performed using PCR (Polymerase Chain Reaction)/SSOP  (Sequence Specific Oligonucleotide Probes) technique  SBT (Sequence  Based Typing) and/or SSP (Sequence Specific Primers) may be used as  supplemental methods when necessary  Please contact [facility] Customer  Service at [phone] if you have any questions  Director of [facility] Laboratory   Dr Nehal Bridges, PhD    Ferritin 08/28/2020 40  8 - 388 ng/mL Final    ds DNA Ab 08/28/2020 1  0 - 9 IU/mL Final                                       Negative      <5                                     Equivocal  5 - 9                                     Positive      >9   Orders Only on 07/14/2020   Component Date Value Ref Range Status    SARS-CoV-2  07/14/2020 Not Detected  Not Detected Final    This test was developed and its performance characteristics determined  by [employer]  This test has not been FDA cleared or  approved  This test has been authorized by FDA under an Emergency Use  Authorization (EUA)   This test is only authorized for the duration of  time the declaration that circumstances exist justifying the  authorization of the emergency use of in vitro diagnostic tests for  detection of SARS-CoV-2 virus and/or diagnosis of COVID-19 infection  under section 564(b)(1) of the Act, 21 U  S C  525LPI-7(A)(1), unless  the authorization is terminated or revoked sooner  When diagnostic testing is negative, the possibility of a false  negative result should be considered in the context of a patient's  recent exposures and the presence of clinical signs and symptoms  consistent with COVID-19  An individual without symptoms of COVID-19  and who is not shedding SARS-CoV-2 virus would expect to have a  negative (not detected) result in this assay   Inpatient 07/14/2020 Comment   Final    Received   There may be more visits with results that are not included

## 2021-06-21 ENCOUNTER — DOCUMENTATION (OUTPATIENT)
Dept: NEUROLOGY | Facility: CLINIC | Age: 31
End: 2021-06-21

## 2021-06-21 NOTE — PROGRESS NOTES
Type Date User Summary Attachment   General 06/18/2021  3:31 PM Herlinda Golden care coordination  -   Note    Botox- authorization #: [id number]- 3rd visit- valid from 2/1/2021 until 1/31/2022   Please use our stock      Thank you,     Abilio Cary

## 2021-07-22 ENCOUNTER — TELEPHONE (OUTPATIENT)
Dept: URGENT CARE | Age: 31
End: 2021-07-22

## 2021-07-22 ENCOUNTER — OFFICE VISIT (OUTPATIENT)
Dept: URGENT CARE | Age: 31
End: 2021-07-22
Payer: COMMERCIAL

## 2021-07-22 VITALS
HEIGHT: 61 IN | OXYGEN SATURATION: 98 % | SYSTOLIC BLOOD PRESSURE: 137 MMHG | RESPIRATION RATE: 20 BRPM | TEMPERATURE: 98 F | DIASTOLIC BLOOD PRESSURE: 73 MMHG | WEIGHT: 126 LBS | HEART RATE: 82 BPM | BODY MASS INDEX: 23.79 KG/M2

## 2021-07-22 DIAGNOSIS — J01.00 ACUTE MAXILLARY SINUSITIS, RECURRENCE NOT SPECIFIED: Primary | ICD-10-CM

## 2021-07-22 DIAGNOSIS — B37.9 CANDIDIASIS: ICD-10-CM

## 2021-07-22 PROCEDURE — G0382 LEV 3 HOSP TYPE B ED VISIT: HCPCS | Performed by: PHYSICIAN ASSISTANT

## 2021-07-22 RX ORDER — FLUCONAZOLE 150 MG/1
150 TABLET ORAL ONCE
Qty: 1 TABLET | Refills: 0 | Status: SHIPPED | OUTPATIENT
Start: 2021-07-22 | End: 2021-07-22

## 2021-07-22 RX ORDER — AMOXICILLIN AND CLAVULANATE POTASSIUM 875; 125 MG/1; MG/1
1 TABLET, FILM COATED ORAL EVERY 12 HOURS SCHEDULED
Qty: 14 TABLET | Refills: 0 | Status: SHIPPED | OUTPATIENT
Start: 2021-07-22 | End: 2021-07-29

## 2021-07-22 NOTE — PROGRESS NOTES
[facility]        NAME: Rima Dunbar is a 32 y o  female  : 1990    MRN: [id number]  DATE: 2021  TIME: 4:54 PM    Assessment and Plan   Acute maxillary sinusitis, recurrence not specified [J01 00]  1  Acute maxillary sinusitis, recurrence not specified  amoxicillin-clavulanate (AUGMENTIN) 875-125 mg per tablet   2  Candidiasis  fluconazole (DIFLUCAN) 150 mg tablet         Patient Instructions       Follow up with PCP in 3-5 days  Proceed to  ER if symptoms worsen  Chief Complaint     Chief Complaint   Patient presents with    Earache     BILATERAL EAR PAIN RADIATING TO HER THROAT AND MUCUS COLOR YELLOW/ GREEN FOR 1 WEEK  History of Present Illness         Patient for evaluation bilateral ear pain left greater than right and nasal congestion and sinus pressure  Review of Systems   Review of Systems   Constitutional: Negative  HENT: Positive for congestion, ear pain, postnasal drip, rhinorrhea and sinus pressure  Negative for ear discharge, sinus pain, sore throat, trouble swallowing and voice change  Eyes: Negative  Respiratory: Negative for cough, shortness of breath and wheezing  Cardiovascular: Negative            Current Medications       Current Outpatient Medications:     amphetamine-dextroamphetamine (ADDERALL) 20 mg tablet, Take 20 mg by mouth daily , Disp: , Rfl: 0    buPROPion (WELLBUTRIN SR) 150 mg 12 hr tablet, Take 150 mg by mouth 2 (two) times a day , Disp: , Rfl:     dexamethasone (DECADRON) 1 mg tablet, Take 1 tablet (1 mg total) by mouth daily with breakfast, Disp: 10 tablet, Rfl: 0    diazepam (VALIUM) 5 mg tablet, Take 5 mg by mouth every 6 (six) hours as needed , Disp: , Rfl:     diclofenac (VOLTAREN) 75 mg EC tablet, Take 1 tablet (75 mg total) by mouth 2 (two) times a day, Disp: 180 tablet, Rfl: 1    famotidine (PEPCID) 20 mg tablet, Take 1 tablet (20 mg total) by mouth daily, Disp: 90 tablet, Rfl: 1    fexofenadine (ALLEGRA) 180 MG tablet, Take 180 mg by mouth daily, Disp: , Rfl:     FLUoxetine (PROzac) 10 mg capsule, Take 10 mg by mouth daily , Disp: , Rfl:     hydroxychloroquine (PLAQUENIL) 200 mg tablet, Take 1 5 tablets (300 mg total) by mouth daily, Disp: 135 tablet, Rfl: 1    Ibuprofen 200 MG CAPS, Take 800 mg by mouth every 6 (six) hours as needed, Disp: , Rfl:     ketorolac (TORADOL) 10 mg tablet, Take 1 tablet (10 mg total) by mouth every 6 (six) hours as needed for moderate pain, Disp: 10 tablet, Rfl: 3    lamoTRIgine (LaMICtal) 150 MG tablet, Take 150 mg by mouth daily , Disp: , Rfl:     levonorgestrel (KYLEENA) 19 5 MG intrauterine device, 1 Intra Uterine Device by Intrauterine route once, Disp: , Rfl:     levothyroxine 100 mcg tablet, TAKE ONE TABLET BY MOUTH ONCE DAILY, Disp: 90 tablet, Rfl: 3    methocarbamol (ROBAXIN) 500 mg tablet, Take 1 tablet (500 mg total) by mouth 3 (three) times a day as needed for muscle spasms, Disp: 30 tablet, Rfl: 2    OLANZapine-FLUoxetine (SYMBYAX) 3-25 MG per capsule, Take 1 capsule by mouth every evening, Disp: , Rfl:     Plecanatide (Trulance) 3 MG TABS, Take 1 tablet by mouth daily, Disp: 30 tablet, Rfl: 5    polyethylene glycol (MIRALAX) 17 g packet, Take 17 g by mouth daily, Disp: , Rfl:     prochlorperazine (COMPAZINE) 10 mg tablet, Take 1 tablet (10 mg total) by mouth every 6 (six) hours as needed (migraine), Disp: 10 tablet, Rfl: 0    Rimegepant Sulfate (Nurtec) 75 MG TBDP, Take 75 mg by mouth as needed (migraine), Disp: 8 tablet, Rfl: 3    SUMAtriptan (IMITREX) 100 mg tablet, TAKE ONE TABLET BY MOUTH ONCE AS NEEDED FOR MIGRAINE FOR UP TO 1 DOSE  MAX 2 TABS/24 HOURS  MAX 9 TABS/MONTH , Disp: 27 tablet, Rfl: 0    topiramate (TOPAMAX) 50 MG tablet, 1 tab in the am and 2 tabs at bedtime, Disp: 270 tablet, Rfl: 3    Ubrogepant (UBRELVY) 100 MG tablet, Take 1 tablet (100 mg) one time as needed for migraine   May repeat one additional tablet (100 mg) at least two hours after the first dose   Do not use more than two doses per day, or for more than 10 days per month , Disp: 10 tablet, Rfl: 3    amoxicillin-clavulanate (AUGMENTIN) 875-125 mg per tablet, Take 1 tablet by mouth every 12 (twelve) hours for 7 days, Disp: 14 tablet, Rfl: 0    cyclobenzaprine (FLEXERIL) 5 mg tablet, Take 2 tablets (10 mg total) by mouth daily at bedtime (Patient taking differently: Take 10 mg by mouth as needed ), Disp: 180 tablet, Rfl: 3    dexamethasone (DECADRON) 1 mg tablet, Take 1 tablet (1 mg total) by mouth daily with breakfast (Patient taking differently: Take 1 mg by mouth as needed ), Disp: 5 tablet, Rfl: 0    fluconazole (DIFLUCAN) 150 mg tablet, Take 1 tablet (150 mg total) by mouth once for 1 dose, Disp: 1 tablet, Rfl: 0    mupirocin (BACTROBAN) 2 % ointment, Apply topically 3 (three) times a day (Patient taking differently: Apply 1 application topically 3 (three) times a day as needed ), Disp: 30 g, Rfl: 5    nitrofurantoin (MACROBID) 100 mg capsule, Take 1 capsule (100 mg total) by mouth 2 (two) times a day (Patient not taking: Reported on 6/18/2021), Disp: 7 capsule, Rfl: 0    nitrofurantoin (MACRODANTIN) 50 mg capsule, Take 1 capsule (50 mg total) by mouth daily at bedtime as needed (after intercourse) (Patient not taking: Reported on 4/16/2021), Disp: 14 capsule, Rfl: 1    Rimegepant Sulfate (Nurtec) 75 MG TBDP, Take 75 mg by mouth every other day (Patient not taking: Reported on 6/18/2021), Disp: 16 tablet, Rfl: 11    Current Allergies     Allergies as of 07/22/2021 - Reviewed 07/22/2021   Allergen Reaction Noted    Oxcarbazepine Hives 12/23/2014            The following portions of the patient's history were reviewed and updated as appropriate: allergies, current medications, past family history, past medical history, past social history, past surgical history and problem list      Past Medical History:   Diagnosis Date    Anxiety     Depression     Encounter for IUD removal and reinsertion 5/29/2019    Lupus ([address] )     Migraines     PONV (postoperative nausea and vomiting)        Past Surgical History:   Procedure Laterality Date    COLONOSCOPY      INGUINAL HERNIA REPAIR  10/1998    REDUCTION MAMMAPLASTY  03/2013    TONSILLECTOMY  06/2011    TONSILLECTOMY      UPPER GASTROINTESTINAL ENDOSCOPY      WISDOM TOOTH EXTRACTION         Family History   Problem Relation Age of Onset    Colon cancer Mother     Arthritis Mother     Thyroid disease Mother     Depression Mother     Anxiety disorder Mother     Clotting disorder Mother     Hyperlipidemia Mother     Vesicoureteral reflux Mother     Irritable bowel syndrome Mother     Migraines Mother     Skin cancer Mother     Thyroid disease unspecified Mother     Heart disease Father     Hypertension Father     Arthritis Father     Hyperlipidemia Father     Vesicoureteral reflux Father     Hypertension Brother     Hyperlipidemia Brother     COPD Maternal Grandmother     Stroke Maternal Grandmother     Lung cancer Maternal Grandmother     Arthritis Maternal Grandmother     Asthma Maternal Grandmother     Hyperlipidemia Maternal Grandmother     Vesicoureteral reflux Maternal Grandmother     Heart disease Maternal Grandmother     Hypertension Maternal Grandmother     Migraines Maternal Grandmother     Osteoporosis Maternal Grandmother     Heart attack Maternal Grandfather     Heart disease Maternal Grandfather     Diabetes Maternal Grandfather     Heart disease Paternal Grandmother     Hyperlipidemia Paternal Grandmother     Diabetes Paternal Grandfather     Kidney disease Maternal Aunt     Migraines Maternal Aunt     Heart attack Maternal Uncle     Heart disease Maternal Uncle     Melanoma Maternal Uncle     Lupus Cousin          Medications have been verified          Objective   /73   Pulse 82   Temp 98 °F (36 7 °C) (Temporal)   Resp 20   Ht 5' 1" (1 549 m)   Wt 57 2 kg (126 lb)   SpO2 98% BMI 23 81 kg/m²   No LMP recorded  Physical Exam     Physical Exam  Vitals and nursing note reviewed  Constitutional:       General: She is not in acute distress  Appearance: Normal appearance  She is well-developed  She is not ill-appearing, toxic-appearing or diaphoretic  HENT:      Head: Normocephalic and atraumatic  Right Ear: Ear canal and external ear normal  No middle ear effusion  Tympanic membrane is not scarred, perforated, erythematous, retracted or bulging  Left Ear: Ear canal and external ear normal  A middle ear effusion (Cloudy fluid) is present  Tympanic membrane is bulging  Tympanic membrane is not scarred, perforated, erythematous or retracted  Nose: Congestion present  Comments: Bilateral nasal erythema mucopurulent drainage  Bilateral maxillary and frontal sinus tenderness  Mouth/Throat:      Pharynx: Posterior oropharyngeal erythema present  No oropharyngeal exudate  Eyes:      General:         Right eye: No discharge  Left eye: No discharge  Conjunctiva/sclera: Conjunctivae normal       Pupils: Pupils are equal, round, and reactive to light  Cardiovascular:      Rate and Rhythm: Normal rate and regular rhythm  Heart sounds: Normal heart sounds  No murmur heard  Pulmonary:      Effort: Pulmonary effort is normal  No respiratory distress  Breath sounds: Normal breath sounds  No stridor  No wheezing, rhonchi or rales  Lymphadenopathy:      Cervical: Cervical adenopathy present  Skin:     General: Skin is warm and dry  Neurological:      Mental Status: She is alert and oriented to person, place, and time     Psychiatric:         Behavior: Behavior normal

## 2021-07-27 ENCOUNTER — APPOINTMENT (OUTPATIENT)
Dept: RADIOLOGY | Age: 31
End: 2021-07-27
Payer: COMMERCIAL

## 2021-07-27 ENCOUNTER — OFFICE VISIT (OUTPATIENT)
Dept: URGENT CARE | Age: 31
End: 2021-07-27
Payer: COMMERCIAL

## 2021-07-27 VITALS — RESPIRATION RATE: 18 BRPM | HEART RATE: 113 BPM | TEMPERATURE: 98.3 F | OXYGEN SATURATION: 97 %

## 2021-07-27 DIAGNOSIS — R07.89 FEELING OF CHEST TIGHTNESS: ICD-10-CM

## 2021-07-27 DIAGNOSIS — R07.89 FEELING OF CHEST TIGHTNESS: Primary | ICD-10-CM

## 2021-07-27 PROCEDURE — 71046 X-RAY EXAM CHEST 2 VIEWS: CPT

## 2021-07-27 PROCEDURE — U0005 INFEC AGEN DETEC AMPLI PROBE: HCPCS | Performed by: NURSE PRACTITIONER

## 2021-07-27 PROCEDURE — G0382 LEV 3 HOSP TYPE B ED VISIT: HCPCS | Performed by: NURSE PRACTITIONER

## 2021-07-27 PROCEDURE — U0003 INFECTIOUS AGENT DETECTION BY NUCLEIC ACID (DNA OR RNA); SEVERE ACUTE RESPIRATORY SYNDROME CORONAVIRUS 2 (SARS-COV-2) (CORONAVIRUS DISEASE [COVID-19]), AMPLIFIED PROBE TECHNIQUE, MAKING USE OF HIGH THROUGHPUT TECHNOLOGIES AS DESCRIBED BY CMS-2020-01-R: HCPCS | Performed by: NURSE PRACTITIONER

## 2021-07-27 RX ORDER — PREDNISONE 20 MG/1
TABLET ORAL
Qty: 13 TABLET | Refills: 0 | Status: SHIPPED | OUTPATIENT
Start: 2021-07-27 | End: 2021-08-04

## 2021-07-27 NOTE — PROGRESS NOTES
[facility]        NAME: Jorge A Hartley is a 32 y o  female  : 1990    MRN: [id number]  DATE: 2021  TIME: 2:22 PM    Assessment and Plan   Feeling of chest tightness [R07 89]  1  Feeling of chest tightness  XR chest pa & lateral    Novel Coronavirus (Covid-19),PCR [facility] - Office Collection         Patient Instructions     Covid tested; results in 1-2 days  Take meds as directed  Cont Augmentin; start prednisone  Follow up with PCP in 3-5 days  Proceed to  ER if symptoms worsen  Chief Complaint     Chief Complaint   Patient presents with    Sore Throat     throat tightness, chest tightness, and body aches, x last week seen here for sinusitis, rx  antibiotic ,which is still on          History of Present Illness       HPI   Less than 1 week ago was treated for sinusitis, with Augmentin and she is still on the treatment  However started experiencing some throat pain, chest tightness, throat tightness and rib pain  Says it feels like she is breathing through and straw  No Hx of asthma  Not a smoker  Has been vaccinated for covid  Review of Systems   Review of Systems   Constitutional: Negative for chills and fever  HENT: Negative for sore throat and trouble swallowing  Respiratory: Positive for chest tightness  Negative for cough, shortness of breath and wheezing  Cardiovascular: Positive for chest pain (rib pain)  Gastrointestinal: Negative for nausea and vomiting  Neurological: Negative for dizziness and headaches           Current Medications       Current Outpatient Medications:     amoxicillin-clavulanate (AUGMENTIN) 875-125 mg per tablet, Take 1 tablet by mouth every 12 (twelve) hours for 7 days, Disp: 14 tablet, Rfl: 0    amphetamine-dextroamphetamine (ADDERALL) 20 mg tablet, Take 20 mg by mouth daily , Disp: , Rfl: 0    buPROPion (WELLBUTRIN SR) 150 mg 12 hr tablet, Take 150 mg by mouth 2 (two) times a day , Disp: , Rfl:     cyclobenzaprine (FLEXERIL) 5 mg tablet, Take 2 tablets (10 mg total) by mouth daily at bedtime (Patient taking differently: Take 10 mg by mouth as needed ), Disp: 180 tablet, Rfl: 3    dexamethasone (DECADRON) 1 mg tablet, Take 1 tablet (1 mg total) by mouth daily with breakfast (Patient taking differently: Take 1 mg by mouth as needed ), Disp: 5 tablet, Rfl: 0    dexamethasone (DECADRON) 1 mg tablet, Take 1 tablet (1 mg total) by mouth daily with breakfast, Disp: 10 tablet, Rfl: 0    diazepam (VALIUM) 5 mg tablet, Take 5 mg by mouth every 6 (six) hours as needed , Disp: , Rfl:     diclofenac (VOLTAREN) 75 mg EC tablet, Take 1 tablet (75 mg total) by mouth 2 (two) times a day, Disp: 180 tablet, Rfl: 1    famotidine (PEPCID) 20 mg tablet, Take 1 tablet (20 mg total) by mouth daily, Disp: 90 tablet, Rfl: 1    fexofenadine (ALLEGRA) 180 MG tablet, Take 180 mg by mouth daily, Disp: , Rfl:     FLUoxetine (PROzac) 10 mg capsule, Take 10 mg by mouth daily , Disp: , Rfl:     hydroxychloroquine (PLAQUENIL) 200 mg tablet, Take 1 5 tablets (300 mg total) by mouth daily, Disp: 135 tablet, Rfl: 1    Ibuprofen 200 MG CAPS, Take 800 mg by mouth every 6 (six) hours as needed, Disp: , Rfl:     ketorolac (TORADOL) 10 mg tablet, Take 1 tablet (10 mg total) by mouth every 6 (six) hours as needed for moderate pain, Disp: 10 tablet, Rfl: 3    lamoTRIgine (LaMICtal) 150 MG tablet, Take 150 mg by mouth daily , Disp: , Rfl:     levonorgestrel (KYLEENA) 19 5 MG intrauterine device, 1 Intra Uterine Device by Intrauterine route once, Disp: , Rfl:     levothyroxine 100 mcg tablet, TAKE ONE TABLET BY MOUTH ONCE DAILY, Disp: 90 tablet, Rfl: 3    methocarbamol (ROBAXIN) 500 mg tablet, Take 1 tablet (500 mg total) by mouth 3 (three) times a day as needed for muscle spasms, Disp: 30 tablet, Rfl: 2    mupirocin (BACTROBAN) 2 % ointment, Apply topically 3 (three) times a day (Patient taking differently: Apply 1 application topically 3 (three) times a day as needed ), Disp: 30 g, Rfl: 5    nitrofurantoin (MACROBID) 100 mg capsule, Take 1 capsule (100 mg total) by mouth 2 (two) times a day (Patient not taking: Reported on 6/18/2021), Disp: 7 capsule, Rfl: 0    nitrofurantoin (MACRODANTIN) 50 mg capsule, Take 1 capsule (50 mg total) by mouth daily at bedtime as needed (after intercourse) (Patient not taking: Reported on 4/16/2021), Disp: 14 capsule, Rfl: 1    OLANZapine-FLUoxetine (SYMBYAX) 3-25 MG per capsule, Take 1 capsule by mouth every evening, Disp: , Rfl:     Plecanatide (Trulance) 3 MG TABS, Take 1 tablet by mouth daily, Disp: 30 tablet, Rfl: 5    polyethylene glycol (MIRALAX) 17 g packet, Take 17 g by mouth daily, Disp: , Rfl:     prochlorperazine (COMPAZINE) 10 mg tablet, Take 1 tablet (10 mg total) by mouth every 6 (six) hours as needed (migraine), Disp: 10 tablet, Rfl: 0    Rimegepant Sulfate (Nurtec) 75 MG TBDP, Take 75 mg by mouth as needed (migraine), Disp: 8 tablet, Rfl: 3    Rimegepant Sulfate (Nurtec) 75 MG TBDP, Take 75 mg by mouth every other day (Patient not taking: Reported on 6/18/2021), Disp: 16 tablet, Rfl: 11    SUMAtriptan (IMITREX) 100 mg tablet, TAKE ONE TABLET BY MOUTH ONCE AS NEEDED FOR MIGRAINE FOR UP TO 1 DOSE  MAX 2 TABS/24 HOURS  MAX 9 TABS/MONTH , Disp: 27 tablet, Rfl: 0    topiramate (TOPAMAX) 50 MG tablet, 1 tab in the am and 2 tabs at bedtime, Disp: 270 tablet, Rfl: 3    Ubrogepant (UBRELVY) 100 MG tablet, Take 1 tablet (100 mg) one time as needed for migraine  May repeat one additional tablet (100 mg) at least two hours after the first dose   Do not use more than two doses per day, or for more than 10 days per month , Disp: 10 tablet, Rfl: 3    Current Allergies     Allergies as of 07/27/2021 - Reviewed 07/27/2021   Allergen Reaction Noted    Oxcarbazepine Hives 12/23/2014            The following portions of the patient's history were reviewed and updated as appropriate: allergies, current medications, past family history, past medical history, past social history, past surgical history and problem list      Past Medical History:   Diagnosis Date    Anxiety     Depression     Encounter for IUD removal and reinsertion 5/29/2019    Lupus ([address] )     Migraines     PONV (postoperative nausea and vomiting)        Past Surgical History:   Procedure Laterality Date    COLONOSCOPY      INGUINAL HERNIA REPAIR  10/1998    REDUCTION MAMMAPLASTY  03/2013    TONSILLECTOMY  06/2011    TONSILLECTOMY      UPPER GASTROINTESTINAL ENDOSCOPY      WISDOM TOOTH EXTRACTION         Family History   Problem Relation Age of Onset    Colon cancer Mother     Arthritis Mother     Thyroid disease Mother     Depression Mother     Anxiety disorder Mother     Clotting disorder Mother     Hyperlipidemia Mother     Vesicoureteral reflux Mother     Irritable bowel syndrome Mother     Migraines Mother     Skin cancer Mother     Thyroid disease unspecified Mother     Heart disease Father     Hypertension Father     Arthritis Father     Hyperlipidemia Father     Vesicoureteral reflux Father     Hypertension Brother     Hyperlipidemia Brother     COPD Maternal Grandmother     Stroke Maternal Grandmother     Lung cancer Maternal Grandmother     Arthritis Maternal Grandmother     Asthma Maternal Grandmother     Hyperlipidemia Maternal Grandmother     Vesicoureteral reflux Maternal Grandmother     Heart disease Maternal Grandmother     Hypertension Maternal Grandmother     Migraines Maternal Grandmother     Osteoporosis Maternal Grandmother     Heart attack Maternal Grandfather     Heart disease Maternal Grandfather     Diabetes Maternal Grandfather     Heart disease Paternal Grandmother     Hyperlipidemia Paternal Grandmother     Diabetes Paternal Grandfather     Kidney disease Maternal Aunt     Migraines Maternal Aunt     Heart attack Maternal Uncle     Heart disease Maternal Uncle     Melanoma Maternal Uncle     Lupus Cousin Medications have been verified  Objective   Pulse (!) 113   Temp 98 3 °F (36 8 °C)   Resp 18   LMP 07/15/2021 Comment: IUD  SpO2 97%   Patient's last menstrual period was 07/15/2021  Physical Exam     Physical Exam  Constitutional:       Appearance: She is not ill-appearing or diaphoretic  HENT:      Right Ear: Tympanic membrane and ear canal normal       Left Ear: Tympanic membrane and ear canal normal       Nose: Rhinorrhea (mild) present  Mouth/Throat:      Mouth: Mucous membranes are moist       Pharynx: No pharyngeal swelling  Tonsils: No tonsillar exudate  0 on the right  0 on the left  Comments: Mild post nasal drip  Cardiovascular:      Rate and Rhythm: Regular rhythm  Pulmonary:      Effort: Pulmonary effort is normal       Breath sounds: Normal breath sounds  Lymphadenopathy:      Cervical: No cervical adenopathy  Neurological:      Mental Status: She is alert

## 2021-07-28 LAB — SARS-COV-2 RNA RESP QL NAA+PROBE: NEGATIVE

## 2021-08-04 ENCOUNTER — OFFICE VISIT (OUTPATIENT)
Dept: FAMILY MEDICINE CLINIC | Facility: CLINIC | Age: 31
End: 2021-08-04
Payer: COMMERCIAL

## 2021-08-04 VITALS
WEIGHT: 129 LBS | HEIGHT: 61 IN | BODY MASS INDEX: 24.35 KG/M2 | TEMPERATURE: 97.9 F | SYSTOLIC BLOOD PRESSURE: 102 MMHG | DIASTOLIC BLOOD PRESSURE: 78 MMHG

## 2021-08-04 DIAGNOSIS — H66.002 ACUTE SUPPURATIVE OTITIS MEDIA OF LEFT EAR WITHOUT SPONTANEOUS RUPTURE OF TYMPANIC MEMBRANE, RECURRENCE NOT SPECIFIED: ICD-10-CM

## 2021-08-04 DIAGNOSIS — H65.01 RIGHT ACUTE SEROUS OTITIS MEDIA, RECURRENCE NOT SPECIFIED: ICD-10-CM

## 2021-08-04 DIAGNOSIS — T50.905A ADVERSE EFFECT OF DRUG, INITIAL ENCOUNTER: ICD-10-CM

## 2021-08-04 DIAGNOSIS — T78.3XXA ANGIOEDEMA, INITIAL ENCOUNTER: ICD-10-CM

## 2021-08-04 DIAGNOSIS — H69.80 DYSFUNCTION OF EUSTACHIAN TUBE, UNSPECIFIED LATERALITY: ICD-10-CM

## 2021-08-04 DIAGNOSIS — J30.9 ALLERGIC RHINITIS, UNSPECIFIED SEASONALITY, UNSPECIFIED TRIGGER: Primary | ICD-10-CM

## 2021-08-04 PROCEDURE — 99214 OFFICE O/P EST MOD 30 MIN: CPT | Performed by: FAMILY MEDICINE

## 2021-08-04 RX ORDER — PREDNISONE 20 MG/1
TABLET ORAL
Qty: 20 TABLET | Refills: 0 | Status: SHIPPED | OUTPATIENT
Start: 2021-08-04 | End: 2021-08-14

## 2021-08-04 RX ORDER — AZELASTINE 1 MG/ML
2 SPRAY, METERED NASAL 2 TIMES DAILY
Qty: 30 ML | Refills: 3 | Status: ON HOLD | OUTPATIENT
Start: 2021-08-04 | End: 2022-06-01

## 2021-08-04 RX ORDER — AZITHROMYCIN 250 MG/1
TABLET, FILM COATED ORAL
Qty: 6 TABLET | Refills: 0 | Status: SHIPPED | OUTPATIENT
Start: 2021-08-04 | End: 2021-08-04

## 2021-08-04 RX ORDER — DOXYCYCLINE HYCLATE 100 MG/1
100 CAPSULE ORAL EVERY 12 HOURS SCHEDULED
Qty: 20 CAPSULE | Refills: 0 | Status: SHIPPED | OUTPATIENT
Start: 2021-08-04 | End: 2021-08-14

## 2021-08-04 NOTE — PATIENT INSTRUCTIONS
Patient use Astelin nasal spray 2 sprays both nostrils twice daily for the next month and use it as needed  Finish prednisone as ordered   [facility]  Return in 1 week if still symptoms  I recommend patient get a bracelet or some kind of warning to keep with her at all times about an anaphylactic reaction to Augmentin /penicillins

## 2021-08-04 NOTE — PROGRESS NOTES
Assessment and Plan:   1  Allergic rhinitis, Astelin nasal spray ordered  2  Eustachian tube dysfunction, as above, prednisone  3  Acute otitis media both infectious and serous, prednisone Zithromax ordered  4  Adverse drug reaction  Augmentin caused angioedema  Explained anaphylactic reactions  She should never use penicillins Augmentin again is a could cause a even more severe reaction and even death  I recommend patient carry a warning with her at all times about this allergic life-threatening reaction  5  Return in 1 week if still symptoms  6  Notified by pharmacy zpack has interaction with hydroxychloroquine  zpack d/c'd and doxycycline sent in      Problem List Items Addressed This Visit        Respiratory    Allergic rhinitis - Primary      Astelin nasal spray ordered         Relevant Medications    azelastine (ASTELIN) 0 1 % nasal spray    predniSONE 20 mg tablet      Other Visit Diagnoses     Dysfunction of Eustachian tube, unspecified laterality        Relevant Medications    predniSONE 20 mg tablet    Acute suppurative otitis media of left ear without spontaneous rupture of tympanic membrane, recurrence not specified        Relevant Medications    azithromycin (ZITHROMAX) 250 mg tablet    predniSONE 20 mg tablet    Right acute serous otitis media, recurrence not specified        Relevant Medications    predniSONE 20 mg tablet    Adverse effect of drug, initial encounter        Relevant Medications    predniSONE 20 mg tablet    Angioedema, initial encounter        Relevant Medications    predniSONE 20 mg tablet                 Diagnoses and all orders for this visit:    Allergic rhinitis, unspecified seasonality, unspecified trigger  -     azelastine (ASTELIN) 0 1 % nasal spray; 2 sprays into each nostril 2 (two) times a day Use in each nostril as directed  -     predniSONE 20 mg tablet; Take 3 tablets daily for 3 days, 2 tablets daily for 3 days, 1 tablet daily for 4 days    Take with food    Dysfunction of Eustachian tube, unspecified laterality  -     predniSONE 20 mg tablet; Take 3 tablets daily for 3 days, 2 tablets daily for 3 days, 1 tablet daily for 4 days  Take with food    Acute suppurative otitis media of left ear without spontaneous rupture of tympanic membrane, recurrence not specified  -     azithromycin (ZITHROMAX) 250 mg tablet; Take 2 tablets today then 1 tablet daily till finished  -     predniSONE 20 mg tablet; Take 3 tablets daily for 3 days, 2 tablets daily for 3 days, 1 tablet daily for 4 days  Take with food    Right acute serous otitis media, recurrence not specified  -     predniSONE 20 mg tablet; Take 3 tablets daily for 3 days, 2 tablets daily for 3 days, 1 tablet daily for 4 days  Take with food    Adverse effect of drug, initial encounter  -     predniSONE 20 mg tablet; Take 3 tablets daily for 3 days, 2 tablets daily for 3 days, 1 tablet daily for 4 days  Take with food    Angioedema, initial encounter  -     predniSONE 20 mg tablet; Take 3 tablets daily for 3 days, 2 tablets daily for 3 days, 1 tablet daily for 4 days  Take with food              Subjective:      Patient ID: Ashley Mccoy is a 32 y o  female  CC:    Chief Complaint   Patient presents with    Earache     both ears x 2 weeks just finished abx    mgb    Sore Throat     x 2 weeks  mgb       HPI:     For the past 2-3 weeks patient has had head congestion sinus pain went to urgent care was prescribed Augmentin had some chest tightness and then had full-blown anaphylaxis with swollen tongue  Patient did stop this and take Benadryl but did not seek medical treatment  Patient still with left ear pain and feels like she is "under water"  No fever chills patient denies any chest pain shortness of breath wheezing hemoptysis at the present time        The following portions of the patient's history were reviewed and updated as appropriate: allergies, current medications, past family history, past medical history, past social history, past surgical history and problem list       Review of Systems   Constitutional:        HPI   HENT:        HPI   Eyes: Negative  Respiratory:        HPI   Cardiovascular: Negative  Gastrointestinal: Negative  Endocrine: Negative  Genitourinary: Negative  Musculoskeletal: Negative  Skin: Negative  Allergic/Immunologic:        HPI   Neurological: Negative  Hematological: Negative  Psychiatric/Behavioral: Negative  Data to review:       Objective:    Vitals:    08/04/21 1626   BP: 102/78   BP Location: Right arm   Patient Position: Sitting   Cuff Size: Standard   Temp: 97 9 °F (36 6 °C)   TempSrc: Temporal   Weight: 58 5 kg (129 lb)   Height: 5' 1" (1 549 m)        Physical Exam  Vitals and nursing note reviewed  Constitutional:       Appearance: She is well-developed  HENT:      Head: Normocephalic and atraumatic  Ears:      Comments: Left tympanic membrane dull with fluid  Positive injection  right TM dull with fluid no injection     Nose:      Comments: Mildly allergic turbinates negative sinus tenderness     Mouth/Throat:      Mouth: Mucous membranes are moist       Pharynx: Oropharynx is clear  Posterior oropharyngeal erythema present  No pharyngeal swelling or oropharyngeal exudate  Comments: Mild pharyngeal injection negative exudate negative angioedema  Cardiovascular:      Rate and Rhythm: Normal rate and regular rhythm  Heart sounds: Normal heart sounds  Pulmonary:      Effort: Pulmonary effort is normal       Breath sounds: Normal breath sounds  Musculoskeletal:      Cervical back: Neck supple  Right lower leg: No edema  Left lower leg: No edema  Lymphadenopathy:      Cervical: No cervical adenopathy  Skin:     General: Skin is warm and dry  Neurological:      General: No focal deficit present  Mental Status: She is alert     Psychiatric:         Mood and Affect: Mood normal

## 2021-08-09 ENCOUNTER — PROCEDURE VISIT (OUTPATIENT)
Dept: NEUROLOGY | Facility: CLINIC | Age: 31
End: 2021-08-09
Payer: COMMERCIAL

## 2021-08-09 VITALS — HEART RATE: 91 BPM | TEMPERATURE: 97.7 F | SYSTOLIC BLOOD PRESSURE: 115 MMHG | DIASTOLIC BLOOD PRESSURE: 73 MMHG

## 2021-08-09 DIAGNOSIS — G43.701 CHRONIC MIGRAINE WITHOUT AURA WITH STATUS MIGRAINOSUS, NOT INTRACTABLE: Primary | ICD-10-CM

## 2021-08-09 PROCEDURE — 64615 CHEMODENERV MUSC MIGRAINE: CPT | Performed by: PHYSICIAN ASSISTANT

## 2021-08-09 NOTE — PROGRESS NOTES
Universal Protocol   Consent: Verbal consent obtained  Written consent obtained  Risks and benefits: risks, benefits and alternatives were discussed  Consent given by: patient  Time out: Immediately prior to procedure a "time out" was called to verify the correct patient, procedure, equipment, support staff and site/side marked as required  Patient understanding: patient states understanding of the procedure being performed  Patient consent: the patient's understanding of the procedure matches consent given  Procedure consent: procedure consent matches procedure scheduled  Relevant documents: relevant documents present and verified  Patient identity confirmed: verbally with patient        Chemodenervation     Date/Time 8/9/2021 3:02 PM     Performed by  Ramesh Choi PA-C     Authorized by Ramesh Choi PA-C        Pre-procedure details      Prepped With: Alcohol     Anesthesia  (see MAR for exact dosages):      Anesthesia method:  None   Procedure details     Position:  Upright   Botox     Botox Type:  Type A    Brand:  Botox    mL's of Botulinum Toxin:  200    Final Concentration per CC:  50 units    Needle Gauge:  30 G 2 5 inch   Procedures     Botox Procedures: chronic headache      Indications: migraines     Injection Location      Head / Face:  L superior cervical paraspinal, R superior cervical paraspinal, L , R , L frontalis, R frontalis, L medial occipitalis, R medial occipitalis, procerus, R temporalis, L temporalis, R superior trapezius and L superior trapezius    L  injection amount:  5 unit(s)    R  injection amount:  5 unit(s)    L lateral frontalis:  5 unit(s)    R lateral frontalis:  5 unit(s)    L medial frontalis:  5 unit(s)    R medial frontalis:  5 unit(s)    L temporalis injection amount:  20 unit(s)    R temporalis injection amount:  20 unit(s)    Procerus injection amount:  5 unit(s)    L medial occipitalis injection amount:  15 unit(s)    R medial occipitalis injection amount:  15 unit(s)    L superior cervical paraspinal injection amount:  10 unit(s)    R superior cervical paraspinal injection amount:  10 unit(s)    L superior trapezius injection amount:  15 unit(s)    R superior trapezius injection amount:  15 unit(s)   Total Units     Total units used:  200    Total units discarded:  0   Post-procedure details      Chemodenervation:  Chronic migraine    Facial Nerve Location[de-identified]  Bilateral facial nerve    Patient tolerance of procedure:   Tolerated well, no immediate complications   Comments      5 units orbicularis oculi bilaterally  20 units frontalis  15 units temporalis  All medically necessary

## 2021-08-21 ENCOUNTER — IMMUNIZATIONS (OUTPATIENT)
Dept: FAMILY MEDICINE CLINIC | Facility: HOSPITAL | Age: 31
End: 2021-08-21

## 2021-08-21 DIAGNOSIS — Z23 ENCOUNTER FOR IMMUNIZATION: Primary | ICD-10-CM

## 2021-08-21 PROCEDURE — 0001A SARS-COV-2 / COVID-19 MRNA VACCINE (PFIZER-BIONTECH) 30 MCG: CPT

## 2021-08-21 PROCEDURE — 91300 SARS-COV-2 / COVID-19 MRNA VACCINE (PFIZER-BIONTECH) 30 MCG: CPT

## 2021-09-20 ENCOUNTER — TELEPHONE (OUTPATIENT)
Dept: OBGYN CLINIC | Facility: OTHER | Age: 31
End: 2021-09-20

## 2021-09-20 DIAGNOSIS — M35.9 UNDIFFERENTIATED CONNECTIVE TISSUE DISEASE (HCC): ICD-10-CM

## 2021-09-20 RX ORDER — PREDNISONE 10 MG/1
TABLET ORAL
Qty: 21 TABLET | Refills: 0 | Status: SHIPPED | OUTPATIENT
Start: 2021-09-20 | End: 2021-10-04

## 2021-09-20 RX ORDER — HYDROXYCHLOROQUINE SULFATE 200 MG/1
300 TABLET, FILM COATED ORAL DAILY
Qty: 135 TABLET | Refills: 1 | Status: SHIPPED | OUTPATIENT
Start: 2021-09-20 | End: 2021-10-22 | Stop reason: SDUPTHER

## 2021-09-20 RX ORDER — FAMOTIDINE 20 MG/1
20 TABLET, FILM COATED ORAL DAILY
Qty: 90 TABLET | Refills: 1 | Status: SHIPPED | OUTPATIENT
Start: 2021-09-20 | End: 2021-10-22 | Stop reason: SDUPTHER

## 2021-09-20 NOTE — TELEPHONE ENCOUNTER
Patient called in requesting a refill on the Hydroclorquine and Famotidine  A weeks worth remaining   She is in a horrible flare up, it has been over a week now  She says she is in a lot of pain and her hands are swollen, her hair is falling out  Her hips knees hands wrists and fingers hurt    She was wondering if you would prescribe Prednisone  Hermesr @ [facility]      C/b # [phone] , can leave a detailed message

## 2021-09-20 NOTE — TELEPHONE ENCOUNTER
Please let her know that I am sending both hydroxychloroquine and famotidine, as well as a prednisone course of 20 mg daily for week, then 10 mg daily for a week to her pharmacy

## 2021-09-22 ENCOUNTER — TELEPHONE (OUTPATIENT)
Dept: NEUROLOGY | Facility: CLINIC | Age: 31
End: 2021-09-22

## 2021-09-22 NOTE — TELEPHONE ENCOUNTER
patient of Fabi Valentin PA-C    She is scheduled for trigger point 10/1 and is asking if okay to proceed  She is currently on a prednisone taper of 20 mg po daily this week and 10 mg daily next week due to complete 10/5  Steroids were prescribed  to treat a lupus flare  Her symptoms include: joint/muscle pain, denies exposure to covid and said she feels up to injections if okay to proceed  Please provide recommendation  Thank you      best bonny [phone], Jose Acuna leave a detailed message

## 2021-10-01 ENCOUNTER — OFFICE VISIT (OUTPATIENT)
Dept: NEUROLOGY | Facility: CLINIC | Age: 31
End: 2021-10-01
Payer: COMMERCIAL

## 2021-10-01 VITALS
HEART RATE: 97 BPM | WEIGHT: 130 LBS | BODY MASS INDEX: 24.55 KG/M2 | SYSTOLIC BLOOD PRESSURE: 117 MMHG | DIASTOLIC BLOOD PRESSURE: 71 MMHG | HEIGHT: 61 IN | TEMPERATURE: 97.3 F

## 2021-10-01 DIAGNOSIS — M79.18 CERVICAL MYOFASCIAL PAIN SYNDROME: Primary | ICD-10-CM

## 2021-10-01 DIAGNOSIS — G43.701 CHRONIC MIGRAINE WITHOUT AURA WITH STATUS MIGRAINOSUS, NOT INTRACTABLE: ICD-10-CM

## 2021-10-01 DIAGNOSIS — G43.709 CHRONIC MIGRAINE WITHOUT AURA WITHOUT STATUS MIGRAINOSUS, NOT INTRACTABLE: ICD-10-CM

## 2021-10-01 PROCEDURE — 20553 NJX 1/MLT TRIGGER POINTS 3/>: CPT | Performed by: PHYSICIAN ASSISTANT

## 2021-10-01 PROCEDURE — 99215 OFFICE O/P EST HI 40 MIN: CPT | Performed by: PHYSICIAN ASSISTANT

## 2021-10-01 RX ORDER — BUPIVACAINE HYDROCHLORIDE 2.5 MG/ML
8 INJECTION, SOLUTION EPIDURAL; INFILTRATION; INTRACAUDAL ONCE
Status: COMPLETED | OUTPATIENT
Start: 2021-10-01 | End: 2021-10-01

## 2021-10-01 RX ORDER — TOPIRAMATE 50 MG/1
TABLET, FILM COATED ORAL
Qty: 270 TABLET | Refills: 3 | Status: SHIPPED | OUTPATIENT
Start: 2021-10-01 | End: 2022-05-05 | Stop reason: SDUPTHER

## 2021-10-01 RX ORDER — DEXAMETHASONE 1 MG
1 TABLET ORAL AS NEEDED
Qty: 10 TABLET | Refills: 2 | Status: SHIPPED | OUTPATIENT
Start: 2021-10-01 | End: 2022-05-05 | Stop reason: SDUPTHER

## 2021-10-01 RX ORDER — SUMATRIPTAN 100 MG/1
100 TABLET, FILM COATED ORAL AS NEEDED
Qty: 27 TABLET | Refills: 0 | Status: SHIPPED | OUTPATIENT
Start: 2021-10-01 | End: 2022-05-05 | Stop reason: SDUPTHER

## 2021-10-01 RX ORDER — PROCHLORPERAZINE MALEATE 10 MG
10 TABLET ORAL EVERY 6 HOURS PRN
Qty: 10 TABLET | Refills: 0 | Status: SHIPPED | OUTPATIENT
Start: 2021-10-01 | End: 2022-05-05 | Stop reason: SDUPTHER

## 2021-10-01 RX ORDER — KETOROLAC TROMETHAMINE 10 MG/1
10 TABLET, FILM COATED ORAL EVERY 6 HOURS PRN
Qty: 10 TABLET | Refills: 3 | Status: SHIPPED | OUTPATIENT
Start: 2021-10-01 | End: 2022-05-05 | Stop reason: SDUPTHER

## 2021-10-01 RX ADMIN — BUPIVACAINE HYDROCHLORIDE 8 ML: 2.5 INJECTION, SOLUTION EPIDURAL; INFILTRATION; INTRACAUDAL at 14:26

## 2021-10-05 ENCOUNTER — TELEPHONE (OUTPATIENT)
Dept: NEUROLOGY | Facility: CLINIC | Age: 31
End: 2021-10-05

## 2021-10-07 ENCOUNTER — LAB (OUTPATIENT)
Dept: LAB | Age: 31
End: 2021-10-07
Payer: COMMERCIAL

## 2021-10-07 ENCOUNTER — TELEPHONE (OUTPATIENT)
Dept: OBGYN CLINIC | Facility: OTHER | Age: 31
End: 2021-10-07

## 2021-10-07 DIAGNOSIS — E03.9 ACQUIRED HYPOTHYROIDISM: ICD-10-CM

## 2021-10-07 DIAGNOSIS — E78.2 MIXED HYPERLIPIDEMIA: ICD-10-CM

## 2021-10-07 DIAGNOSIS — M32.19 SYSTEMIC LUPUS ERYTHEMATOSUS WITH OTHER ORGAN INVOLVEMENT, UNSPECIFIED SLE TYPE (HCC): Primary | ICD-10-CM

## 2021-10-07 DIAGNOSIS — E03.9 ACQUIRED HYPOTHYROIDISM: Primary | ICD-10-CM

## 2021-10-07 LAB
ALBUMIN SERPL BCP-MCNC: 3.5 G/DL (ref 3.5–5)
ALP SERPL-CCNC: 46 U/L (ref 46–116)
ALT SERPL W P-5'-P-CCNC: 13 U/L (ref 12–78)
ANION GAP SERPL CALCULATED.3IONS-SCNC: 3 MMOL/L (ref 4–13)
AST SERPL W P-5'-P-CCNC: 6 U/L (ref 5–45)
BACTERIA UR QL AUTO: NORMAL /HPF
BASOPHILS # BLD AUTO: 0.07 THOUSANDS/ΜL (ref 0–0.1)
BASOPHILS NFR BLD AUTO: 1 % (ref 0–1)
BILIRUB SERPL-MCNC: 0.28 MG/DL (ref 0.2–1)
BILIRUB UR QL STRIP: NEGATIVE
BUN SERPL-MCNC: 12 MG/DL (ref 5–25)
C3 SERPL-MCNC: 94.4 MG/DL (ref 90–180)
C4 SERPL-MCNC: 18 MG/DL (ref 10–40)
CALCIUM SERPL-MCNC: 9.3 MG/DL (ref 8.3–10.1)
CHLORIDE SERPL-SCNC: 112 MMOL/L (ref 100–108)
CLARITY UR: CLEAR
CO2 SERPL-SCNC: 24 MMOL/L (ref 21–32)
COLOR UR: YELLOW
CREAT SERPL-MCNC: 0.95 MG/DL (ref 0.6–1.3)
CREAT UR-MCNC: 32.8 MG/DL
CRP SERPL QL: <3 MG/L
EOSINOPHIL # BLD AUTO: 0.16 THOUSAND/ΜL (ref 0–0.61)
EOSINOPHIL NFR BLD AUTO: 3 % (ref 0–6)
ERYTHROCYTE [DISTWIDTH] IN BLOOD BY AUTOMATED COUNT: 11.5 % (ref 11.6–15.1)
ERYTHROCYTE [SEDIMENTATION RATE] IN BLOOD: <1 MM/HOUR (ref 0–19)
GFR SERPL CREATININE-BSD FRML MDRD: 80 ML/MIN/1.73SQ M
GLUCOSE P FAST SERPL-MCNC: 94 MG/DL (ref 65–99)
GLUCOSE UR STRIP-MCNC: NEGATIVE MG/DL
HCT VFR BLD AUTO: 38.3 % (ref 34.8–46.1)
HGB BLD-MCNC: 12.7 G/DL (ref 11.5–15.4)
HGB UR QL STRIP.AUTO: NEGATIVE
HYALINE CASTS #/AREA URNS LPF: NORMAL /LPF
IMM GRANULOCYTES # BLD AUTO: 0.07 THOUSAND/UL (ref 0–0.2)
IMM GRANULOCYTES NFR BLD AUTO: 1 % (ref 0–2)
KETONES UR STRIP-MCNC: NEGATIVE MG/DL
LEUKOCYTE ESTERASE UR QL STRIP: NEGATIVE
LYMPHOCYTES # BLD AUTO: 1.52 THOUSANDS/ΜL (ref 0.6–4.47)
LYMPHOCYTES NFR BLD AUTO: 25 % (ref 14–44)
MCH RBC QN AUTO: 30.7 PG (ref 26.8–34.3)
MCHC RBC AUTO-ENTMCNC: 33.2 G/DL (ref 31.4–37.4)
MCV RBC AUTO: 93 FL (ref 82–98)
MONOCYTES # BLD AUTO: 0.32 THOUSAND/ΜL (ref 0.17–1.22)
MONOCYTES NFR BLD AUTO: 5 % (ref 4–12)
NEUTROPHILS # BLD AUTO: 3.93 THOUSANDS/ΜL (ref 1.85–7.62)
NEUTS SEG NFR BLD AUTO: 65 % (ref 43–75)
NITRITE UR QL STRIP: NEGATIVE
NON-SQ EPI CELLS URNS QL MICRO: NORMAL /HPF
NRBC BLD AUTO-RTO: 0 /100 WBCS
PH UR STRIP.AUTO: 7.5 [PH]
PLATELET # BLD AUTO: 210 THOUSANDS/UL (ref 149–390)
PMV BLD AUTO: 12.4 FL (ref 8.9–12.7)
POTASSIUM SERPL-SCNC: 4.2 MMOL/L (ref 3.5–5.3)
PROT SERPL-MCNC: 6.7 G/DL (ref 6.4–8.2)
PROT UR STRIP-MCNC: NEGATIVE MG/DL
PROT UR-MCNC: 6 MG/DL
PROT/CREAT UR: 0.18 MG/G{CREAT} (ref 0–0.1)
RBC # BLD AUTO: 4.14 MILLION/UL (ref 3.81–5.12)
RBC #/AREA URNS AUTO: NORMAL /HPF
SODIUM SERPL-SCNC: 139 MMOL/L (ref 136–145)
SP GR UR STRIP.AUTO: 1.01 (ref 1–1.03)
TSH SERPL DL<=0.05 MIU/L-ACNC: 1.48 UIU/ML (ref 0.36–3.74)
UROBILINOGEN UR QL STRIP.AUTO: 0.2 E.U./DL
WBC # BLD AUTO: 6.07 THOUSAND/UL (ref 4.31–10.16)
WBC #/AREA URNS AUTO: NORMAL /HPF

## 2021-10-07 PROCEDURE — 85025 COMPLETE CBC W/AUTO DIFF WBC: CPT | Performed by: INTERNAL MEDICINE

## 2021-10-07 PROCEDURE — 82570 ASSAY OF URINE CREATININE: CPT | Performed by: INTERNAL MEDICINE

## 2021-10-07 PROCEDURE — 86160 COMPLEMENT ANTIGEN: CPT | Performed by: INTERNAL MEDICINE

## 2021-10-07 PROCEDURE — 84156 ASSAY OF PROTEIN URINE: CPT | Performed by: INTERNAL MEDICINE

## 2021-10-07 PROCEDURE — 80053 COMPREHEN METABOLIC PANEL: CPT | Performed by: INTERNAL MEDICINE

## 2021-10-07 PROCEDURE — 85652 RBC SED RATE AUTOMATED: CPT | Performed by: INTERNAL MEDICINE

## 2021-10-07 PROCEDURE — 81001 URINALYSIS AUTO W/SCOPE: CPT | Performed by: INTERNAL MEDICINE

## 2021-10-07 PROCEDURE — 86225 DNA ANTIBODY NATIVE: CPT | Performed by: INTERNAL MEDICINE

## 2021-10-07 PROCEDURE — 84443 ASSAY THYROID STIM HORMONE: CPT

## 2021-10-07 PROCEDURE — 36415 COLL VENOUS BLD VENIPUNCTURE: CPT | Performed by: INTERNAL MEDICINE

## 2021-10-07 PROCEDURE — 86140 C-REACTIVE PROTEIN: CPT | Performed by: INTERNAL MEDICINE

## 2021-10-08 ENCOUNTER — TELEPHONE (OUTPATIENT)
Dept: NEUROLOGY | Facility: CLINIC | Age: 31
End: 2021-10-08

## 2021-10-08 LAB — DSDNA AB SER-ACNC: 1 IU/ML (ref 0–9)

## 2021-10-11 DIAGNOSIS — M32.19 SYSTEMIC LUPUS ERYTHEMATOSUS WITH OTHER ORGAN INVOLVEMENT, UNSPECIFIED SLE TYPE (HCC): Primary | ICD-10-CM

## 2021-10-11 RX ORDER — PREDNISONE 1 MG/1
5 TABLET ORAL DAILY
Qty: 30 TABLET | Refills: 0 | Status: SHIPPED | OUTPATIENT
Start: 2021-10-11 | End: 2021-10-22 | Stop reason: ALTCHOICE

## 2021-10-18 ENCOUNTER — OFFICE VISIT (OUTPATIENT)
Dept: URGENT CARE | Age: 31
End: 2021-10-18
Payer: COMMERCIAL

## 2021-10-18 VITALS
OXYGEN SATURATION: 98 % | RESPIRATION RATE: 20 BRPM | SYSTOLIC BLOOD PRESSURE: 130 MMHG | TEMPERATURE: 97.5 F | DIASTOLIC BLOOD PRESSURE: 80 MMHG | HEART RATE: 90 BPM

## 2021-10-18 DIAGNOSIS — M79.671 RIGHT FOOT PAIN: ICD-10-CM

## 2021-10-18 DIAGNOSIS — R52 PAIN: Primary | ICD-10-CM

## 2021-10-18 PROCEDURE — G0382 LEV 3 HOSP TYPE B ED VISIT: HCPCS | Performed by: PHYSICIAN ASSISTANT

## 2021-10-22 ENCOUNTER — OFFICE VISIT (OUTPATIENT)
Dept: RHEUMATOLOGY | Facility: CLINIC | Age: 31
End: 2021-10-22
Payer: COMMERCIAL

## 2021-10-22 VITALS
HEIGHT: 61 IN | DIASTOLIC BLOOD PRESSURE: 78 MMHG | BODY MASS INDEX: 24.55 KG/M2 | SYSTOLIC BLOOD PRESSURE: 110 MMHG | WEIGHT: 130 LBS

## 2021-10-22 DIAGNOSIS — M25.50 ARTHRALGIA, UNSPECIFIED JOINT: ICD-10-CM

## 2021-10-22 DIAGNOSIS — R53.83 OTHER FATIGUE: ICD-10-CM

## 2021-10-22 DIAGNOSIS — I73.00 RAYNAUD'S DISEASE WITHOUT GANGRENE: ICD-10-CM

## 2021-10-22 DIAGNOSIS — M32.19 SYSTEMIC LUPUS ERYTHEMATOSUS WITH OTHER ORGAN INVOLVEMENT, UNSPECIFIED SLE TYPE (HCC): Primary | ICD-10-CM

## 2021-10-22 DIAGNOSIS — Z79.899 HIGH RISK MEDICATION USE: ICD-10-CM

## 2021-10-22 DIAGNOSIS — M35.9 UNDIFFERENTIATED CONNECTIVE TISSUE DISEASE (HCC): ICD-10-CM

## 2021-10-22 DIAGNOSIS — M54.40 BACK PAIN OF LUMBAR REGION WITH SCIATICA: ICD-10-CM

## 2021-10-22 PROCEDURE — 99214 OFFICE O/P EST MOD 30 MIN: CPT | Performed by: INTERNAL MEDICINE

## 2021-10-22 RX ORDER — FAMOTIDINE 20 MG/1
20 TABLET, FILM COATED ORAL 2 TIMES DAILY
Qty: 180 TABLET | Refills: 1 | Status: SHIPPED | OUTPATIENT
Start: 2021-10-22 | End: 2022-02-04 | Stop reason: SDUPTHER

## 2021-10-22 RX ORDER — SULFASALAZINE 500 MG/1
TABLET ORAL
Qty: 120 TABLET | Refills: 3 | Status: SHIPPED | OUTPATIENT
Start: 2021-10-22 | End: 2022-02-04 | Stop reason: SDUPTHER

## 2021-10-22 RX ORDER — MELOXICAM 15 MG/1
15 TABLET ORAL DAILY
Qty: 30 TABLET | Refills: 3 | Status: SHIPPED | OUTPATIENT
Start: 2021-10-22 | End: 2022-02-04 | Stop reason: SDUPTHER

## 2021-10-22 RX ORDER — PREDNISONE 2.5 MG
2.5 TABLET ORAL DAILY
Qty: 7 TABLET | Refills: 0 | Status: ON HOLD | OUTPATIENT
Start: 2021-10-22 | End: 2022-06-01

## 2021-10-22 RX ORDER — HYDROXYCHLOROQUINE SULFATE 200 MG/1
300 TABLET, FILM COATED ORAL DAILY
Qty: 135 TABLET | Refills: 1 | Status: SHIPPED | OUTPATIENT
Start: 2021-10-22 | End: 2022-02-04 | Stop reason: SDUPTHER

## 2021-11-01 ENCOUNTER — OFFICE VISIT (OUTPATIENT)
Dept: PODIATRY | Facility: CLINIC | Age: 31
End: 2021-11-01
Payer: COMMERCIAL

## 2021-11-01 VITALS
HEIGHT: 60 IN | WEIGHT: 137 LBS | DIASTOLIC BLOOD PRESSURE: 78 MMHG | BODY MASS INDEX: 26.9 KG/M2 | SYSTOLIC BLOOD PRESSURE: 100 MMHG

## 2021-11-01 DIAGNOSIS — M84.374A STRESS FRACTURE, RIGHT FOOT, INITIAL ENCOUNTER FOR FRACTURE: Primary | ICD-10-CM

## 2021-11-01 PROCEDURE — 99243 OFF/OP CNSLTJ NEW/EST LOW 30: CPT | Performed by: PODIATRIST

## 2021-11-24 ENCOUNTER — PROCEDURE VISIT (OUTPATIENT)
Dept: NEUROLOGY | Facility: CLINIC | Age: 31
End: 2021-11-24
Payer: COMMERCIAL

## 2021-11-24 VITALS
WEIGHT: 137 LBS | HEART RATE: 119 BPM | HEIGHT: 62 IN | DIASTOLIC BLOOD PRESSURE: 81 MMHG | BODY MASS INDEX: 25.21 KG/M2 | SYSTOLIC BLOOD PRESSURE: 123 MMHG | TEMPERATURE: 97.6 F

## 2021-11-24 DIAGNOSIS — G43.709 CHRONIC MIGRAINE WITHOUT AURA WITHOUT STATUS MIGRAINOSUS, NOT INTRACTABLE: Primary | ICD-10-CM

## 2021-11-24 PROCEDURE — 64615 CHEMODENERV MUSC MIGRAINE: CPT | Performed by: PHYSICIAN ASSISTANT

## 2021-11-29 ENCOUNTER — OFFICE VISIT (OUTPATIENT)
Dept: PODIATRY | Facility: CLINIC | Age: 31
End: 2021-11-29
Payer: COMMERCIAL

## 2021-11-29 VITALS
BODY MASS INDEX: 26.7 KG/M2 | HEIGHT: 60 IN | DIASTOLIC BLOOD PRESSURE: 76 MMHG | SYSTOLIC BLOOD PRESSURE: 110 MMHG | WEIGHT: 136 LBS

## 2021-11-29 DIAGNOSIS — M84.374A STRESS FRACTURE, RIGHT FOOT, INITIAL ENCOUNTER FOR FRACTURE: Primary | ICD-10-CM

## 2021-11-29 PROCEDURE — 99213 OFFICE O/P EST LOW 20 MIN: CPT | Performed by: PODIATRIST

## 2021-12-01 ENCOUNTER — TELEPHONE (OUTPATIENT)
Dept: NEUROLOGY | Facility: CLINIC | Age: 31
End: 2021-12-01

## 2022-01-05 ENCOUNTER — TELEPHONE (OUTPATIENT)
Dept: NEUROLOGY | Facility: CLINIC | Age: 32
End: 2022-01-05

## 2022-01-06 ENCOUNTER — TELEPHONE (OUTPATIENT)
Dept: NEUROLOGY | Facility: CLINIC | Age: 32
End: 2022-01-06

## 2022-01-06 NOTE — TELEPHONE ENCOUNTER
Patient called she needs her FMLA form  Completed charges were dropped in the amount of $15 00 and collected routed to [facility]

## 2022-01-06 NOTE — TELEPHONE ENCOUNTER
Called patient and I left a message to call back to reschedule her TPI  I did let her know that I do have a 07:30am if this time in the day would work better  If the patient is agreeable please schedule the appointment as an OVL as it needs to be 45 minutes

## 2022-01-19 ENCOUNTER — TELEMEDICINE (OUTPATIENT)
Dept: FAMILY MEDICINE CLINIC | Facility: CLINIC | Age: 32
End: 2022-01-19
Payer: COMMERCIAL

## 2022-01-19 DIAGNOSIS — R50.9 FEVER IN ADULT: ICD-10-CM

## 2022-01-19 DIAGNOSIS — U07.1 COVID-19: Primary | ICD-10-CM

## 2022-01-19 DIAGNOSIS — M32.19 SYSTEMIC LUPUS ERYTHEMATOSUS WITH OTHER ORGAN INVOLVEMENT, UNSPECIFIED SLE TYPE (HCC): ICD-10-CM

## 2022-01-19 PROCEDURE — 99213 OFFICE O/P EST LOW 20 MIN: CPT | Performed by: FAMILY MEDICINE

## 2022-01-19 NOTE — PATIENT INSTRUCTIONS
Patient needs to be fever free for 48 hours before released from isolation  I recommend discussion with Rheumatology for evaluation of fever SLE verses COVID mediated  Return in 1 week if still symptoms if symptoms worsen call office

## 2022-01-19 NOTE — PROGRESS NOTES
COVID-19 Outpatient Progress Note    Assessment/Plan:  1  COVID-19, fully vaccinated, positive test 01/04/2022  COVID day 15    2  Fever  3  SLE  Unsure fever is from COVID verses SLE  Patient needs to be fever free for 48 hours without medication before she is released from isolation  Patient discussed with Rheumatology  4  Return in 1 week if still symptoms if symptoms worsen patient to call office  Problem List Items Addressed This Visit        Other    Fever in adult     Patient must be fever free for 48 hours before released from isolation         Systemic lupus erythematosus ([address] )     Patient to discussed with Rheumatology to evaluate SLE versus COVID fever         COVID-19 - Primary     COVID day 15  Unsure if he was secondary to this or lupus  Patient is to follow with Rheumatology for evaluation              Disposition:     I recommended continued isolation until at least 24 hours have passed since recovery defined as resolution of fever without the use of fever-reducing medications AND improvement in COVID symptoms AND 10 days have passed since onset of symptoms (or 10 days have passed since date of first positive viral diagnostic test for asymptomatic patients)  Patient needs to isolate until she is fever free without medication for 48 hours    I have spent 15 minutes directly with the patient  Encounter provider Nehemias Denson DO    Provider located at [address] [address] [phone]    Recent Visits  No visits were found meeting these conditions  Showing recent visits within past 7 days and meeting all other requirements  Today's Visits  Date Type Provider Dept   01/19/22 Telemedicine Nehemias Denson DO [facility]   Showing today's visits and meeting all other requirements  Future Appointments  No visits were found meeting these conditions    Showing future appointments within next 150 days and meeting all other requirements     This virtual check-in was done via telephone and she agrees to proceed  Patient agrees to participate in a virtual check in via telephone or video visit instead of presenting to the office to address urgent/immediate medical needs  Patient is aware this is a billable service  After connecting through Telephone, the patient was identified by name and date of birth  Zaid Payton was informed that this was a telemedicine visit and that the exam was being conducted confidentially over secure lines  My office door was closed  No one else was in the room  Zaid Payton acknowledged consent and understanding of privacy and security of the telemedicine visit  I informed the patient that I have reviewed her record in Epic and presented the opportunity for her to ask any questions regarding the visit today  The patient agreed to participate  It was my intent to perform this visit via video technology but the patient was not able to do a video connection so the visit was completed via audio telephone only  Verification of patient location:  Patient is located in the following state in which I hold an active license: [place]    Subjective:   Zaid Payton is a 32 y o  female who has been screened for COVID-19  Symptom change since last report: unchanged  Patient's symptoms include fever  Patient denies chills, fatigue, malaise, congestion, rhinorrhea, sore throat, anosmia, loss of taste, cough, shortness of breath, chest tightness, abdominal pain, nausea, vomiting, diarrhea, myalgias and headaches  - Date of symptom onset: 1/4/2022  - Date of positive COVID-19 test: 1/4/2022  Type of test: Home antigen  COVID-19 vaccination status: Fully vaccinated with booster    Maria Isabel has been staying home and has isolated themselves in her home   She is taking care to not share personal items and is cleaning all surfaces that are touched often, like counters, tabletops, and doorknobs using household cleaning sprays or wipes  She is wearing a mask when she leaves her room  Patient had home test positive COVID 01/04/2022  Patient has had fever even now daily of 100 5  However she feels that this is exacerbated her lupus  She has a full-blown lupus attack at the present time  Patient is scheduled to work this week and I told her with a fever she is unable to work    I will give her a note    Lab Results   Component Value Date    SARSCOV2 Negative 11/02/2021    Reji Wilde Not Detected 07/14/2020     Past Medical History:   Diagnosis Date    Anxiety     Depression     Encounter for IUD removal and reinsertion 5/29/2019    Lupus ([address] )     Migraines     PONV (postoperative nausea and vomiting)     Stress fracture, right foot, initial encounter for fracture      Past Surgical History:   Procedure Laterality Date    COLONOSCOPY      INGUINAL HERNIA REPAIR  10/1998    REDUCTION MAMMAPLASTY  03/2013    TONSILLECTOMY  06/2011    TONSILLECTOMY      UPPER GASTROINTESTINAL ENDOSCOPY      WISDOM TOOTH EXTRACTION       Current Outpatient Medications   Medication Sig Dispense Refill    amphetamine-dextroamphetamine (ADDERALL) 20 mg tablet Take 20 mg by mouth daily   0    azelastine (ASTELIN) 0 1 % nasal spray 2 sprays into each nostril 2 (two) times a day Use in each nostril as directed 30 mL 3    buPROPion (WELLBUTRIN SR) 150 mg 12 hr tablet Take 150 mg by mouth 2 (two) times a day       cyclobenzaprine (FLEXERIL) 5 mg tablet Take 2 tablets (10 mg total) by mouth daily at bedtime (Patient taking differently: Take 10 mg by mouth as needed ) 180 tablet 3    dexamethasone (DECADRON) 1 mg tablet Take 1 tablet (1 mg total) by mouth daily with breakfast (Patient not taking: Reported on 10/1/2021) 10 tablet 0    dexamethasone (DECADRON) 1 mg tablet Take 1 tablet (1 mg total) by mouth as needed (migraine) 10 tablet 2    diazepam (VALIUM) 5 mg tablet Take 5 mg by mouth every 6 (six) hours as needed  famotidine (PEPCID) 20 mg tablet Take 1 tablet (20 mg total) by mouth 2 (two) times a day 180 tablet 1    fexofenadine (ALLEGRA) 180 MG tablet Take 180 mg by mouth daily (Patient not taking: Reported on 10/1/2021)      FLUoxetine (PROzac) 10 mg capsule Take 10 mg by mouth daily       hydroxychloroquine (PLAQUENIL) 200 mg tablet Take 1 5 tablets (300 mg total) by mouth daily 135 tablet 1    Ibuprofen 200 MG CAPS Take 800 mg by mouth every 6 (six) hours as needed      ketorolac (TORADOL) 10 mg tablet Take 1 tablet (10 mg total) by mouth every 6 (six) hours as needed for moderate pain 10 tablet 3    lamoTRIgine (LaMICtal) 150 MG tablet Take 150 mg by mouth daily       levonorgestrel (KYLEENA) 19 5 MG intrauterine device 1 Intra Uterine Device by Intrauterine route once      levothyroxine 100 mcg tablet TAKE ONE TABLET BY MOUTH ONCE DAILY 90 tablet 3    meloxicam (Mobic) 15 mg tablet Take 1 tablet (15 mg total) by mouth daily 30 tablet 3    methocarbamol (ROBAXIN) 500 mg tablet Take 1 tablet (500 mg total) by mouth 3 (three) times a day as needed for muscle spasms 30 tablet 2    mupirocin (BACTROBAN) 2 % ointment Apply topically 3 (three) times a day (Patient taking differently: Apply 1 application topically 3 (three) times a day as needed ) 30 g 5    nitrofurantoin (MACROBID) 100 mg capsule Take 1 capsule (100 mg total) by mouth 2 (two) times a day (Patient not taking: Reported on 10/1/2021) 7 capsule 0    nitrofurantoin (MACRODANTIN) 50 mg capsule Take 1 capsule (50 mg total) by mouth daily at bedtime as needed (after intercourse) (Patient not taking: Reported on 4/16/2021) 14 capsule 1    OLANZapine-FLUoxetine (SYMBYAX) 3-25 MG per capsule Take 1 capsule by mouth every evening      Plecanatide (Trulance) 3 MG TABS Take 1 tablet by mouth daily (Patient not taking: Reported on 10/1/2021) 30 tablet 5    polyethylene glycol (MIRALAX) 17 g packet Take 17 g by mouth daily (Patient not taking: Reported on 10/1/2021)      predniSONE 2 5 mg tablet Take 1 tablet (2 5 mg total) by mouth daily 7 tablet 0    prochlorperazine (COMPAZINE) 10 mg tablet Take 1 tablet (10 mg total) by mouth every 6 (six) hours as needed (migraine) 10 tablet 0    Rimegepant Sulfate (Nurtec) 75 MG TBDP Take 75 mg by mouth as needed (migraine) 8 tablet 3    Rimegepant Sulfate (Nurtec) 75 MG TBDP Take 75 mg by mouth every other day (Patient not taking: Reported on 6/18/2021) 16 tablet 11    sulfaSALAzine (AZULFIDINE) 500 mg tablet Take 1 tab daily for a week, then 1 tab twice a day for a week, then 2 tabs twice a day 120 tablet 3    SUMAtriptan (IMITREX) 100 mg tablet Take 1 tablet (100 mg total) by mouth as needed for migraine (migraine) 27 tablet 0    topiramate (TOPAMAX) 50 MG tablet 1 tab in the am and 2 tabs at bedtime 270 tablet 3    Ubrogepant (UBRELVY) 100 MG tablet Take 1 tablet (100 mg) one time as needed for migraine  May repeat one additional tablet (100 mg) at least two hours after the first dose  Do not use more than two doses per day, or for more than 10 days per month  10 tablet 3     No current facility-administered medications for this visit  Allergies   Allergen Reactions    Augmentin [Amoxicillin-Pot Clavulanate] Anaphylaxis    Oxcarbazepine Hives     Hives       Review of Systems   Constitutional: Positive for fever  Negative for chills and fatigue  HENT: Negative for congestion, rhinorrhea and sore throat  Respiratory: Negative for cough, chest tightness and shortness of breath  Gastrointestinal: Negative for abdominal pain, diarrhea, nausea and vomiting  Musculoskeletal: Negative for myalgias  Neurological: Negative for headaches  Objective: There were no vitals filed for this visit  Physical Exam  Constitutional:       Comments: HPI         VIRTUAL VISIT DISCLAIMER    Maria Isabel Goodman verbally agrees to participate in [employer]   Pt is aware that Virtual Care Services could be limited without vital signs or the ability to perform a full hands-on physical exam  Maria Isabel Goodman understands she or the provider may request at any time to terminate the video visit and request the patient to seek care or treatment in person

## 2022-01-19 NOTE — ASSESSMENT & PLAN NOTE
COVID day 15  Unsure if he was secondary to this or lupus    Patient is to follow with Rheumatology for evaluation

## 2022-01-19 NOTE — LETTER
January 19, 2022     Patient: Eros Renteria   YOB: 1990   Date of Visit: 1/19/2022       To Whom It May Concern: It is my medical opinion that Eros Renteria should remain out of work until Patient needs remain in isolation till fever free without medication for 48 hours       If you have any questions or concerns, please don't hesitate to call           Sincerely,        Franklyn Muhammad DO    CC: No Recipients

## 2022-01-20 ENCOUNTER — TELEPHONE (OUTPATIENT)
Dept: OBGYN CLINIC | Facility: HOSPITAL | Age: 32
End: 2022-01-20

## 2022-01-20 DIAGNOSIS — M35.9 UNDIFFERENTIATED CONNECTIVE TISSUE DISEASE (HCC): Primary | ICD-10-CM

## 2022-01-20 DIAGNOSIS — M32.19 SYSTEMIC LUPUS ERYTHEMATOSUS WITH OTHER ORGAN INVOLVEMENT, UNSPECIFIED SLE TYPE (HCC): ICD-10-CM

## 2022-01-20 NOTE — TELEPHONE ENCOUNTER
Dr Angie Chris    [phone]    Patient states that she has covid  It triggered a lupus flare  She has been having fevers she has not been able to work  Her PCP will not clear her for work until cleared from you  Please advise

## 2022-01-21 NOTE — TELEPHONE ENCOUNTER
Patient called back she wanted to let Dr Hugo Koroma know    Fever has been up to 100 6 and stayed constant  Brain fogs    She thinks she is in a Flare she has been having joint pain and stiffness and fatigue  She said she is just exhausted    She has been working remotely from Home , which she has been able to push thru, she can kind of rest in between seeing patients , she has not needed to move a lot like she would be in the office  Also she wanted to add that she is feeling weak and she has been having mouth and nose sores      C/b # [phone] , can leave a detailed message    Thank you

## 2022-01-21 NOTE — TELEPHONE ENCOUNTER
Please ask if she feels functional to work, also how high her fevers are, and if she still feels she's in a lupus flare      thanks

## 2022-01-21 NOTE — TELEPHONE ENCOUNTER
Forms has been filled out, signed by Evi Pierce, faxed over, and I will have the completed form scanned into the chart  Called patient and I left a message making her aware of the above

## 2022-01-22 RX ORDER — PREDNISONE 10 MG/1
TABLET ORAL
Qty: 21 TABLET | Refills: 0 | Status: SHIPPED | OUTPATIENT
Start: 2022-01-22 | End: 2022-02-05

## 2022-01-22 NOTE — TELEPHONE ENCOUNTER
I'm sending a 2-week prednisone course to help her symptoms  She should let us know when she feels ready to go back to work, so I can give a clearance letter then

## 2022-01-25 ENCOUNTER — TELEPHONE (OUTPATIENT)
Dept: NEUROLOGY | Facility: CLINIC | Age: 32
End: 2022-01-25

## 2022-01-26 NOTE — TELEPHONE ENCOUNTER
Pt called and states that her employer said that the fmla form that was faxed was incomplete  Missing the last page  Requesting FMLA form be re-faxed to [phone] attn Vijaya Limon  Fax sent      FYI:     Also, she would like would to cancel her TPI appt on 1/27/22   She will CB to reschedule

## 2022-02-04 ENCOUNTER — OFFICE VISIT (OUTPATIENT)
Dept: RHEUMATOLOGY | Facility: CLINIC | Age: 32
End: 2022-02-04
Payer: COMMERCIAL

## 2022-02-04 ENCOUNTER — APPOINTMENT (OUTPATIENT)
Dept: LAB | Facility: MEDICAL CENTER | Age: 32
End: 2022-02-04
Payer: COMMERCIAL

## 2022-02-04 DIAGNOSIS — R53.83 OTHER FATIGUE: ICD-10-CM

## 2022-02-04 DIAGNOSIS — M35.9 UNDIFFERENTIATED CONNECTIVE TISSUE DISEASE (HCC): ICD-10-CM

## 2022-02-04 DIAGNOSIS — M25.50 ARTHRALGIA, UNSPECIFIED JOINT: ICD-10-CM

## 2022-02-04 DIAGNOSIS — I73.00 RAYNAUD'S DISEASE WITHOUT GANGRENE: ICD-10-CM

## 2022-02-04 DIAGNOSIS — Z79.899 HIGH RISK MEDICATION USE: ICD-10-CM

## 2022-02-04 DIAGNOSIS — M54.40 BACK PAIN OF LUMBAR REGION WITH SCIATICA: ICD-10-CM

## 2022-02-04 DIAGNOSIS — M32.19 SYSTEMIC LUPUS ERYTHEMATOSUS WITH OTHER ORGAN INVOLVEMENT, UNSPECIFIED SLE TYPE (HCC): Primary | ICD-10-CM

## 2022-02-04 LAB
ALBUMIN SERPL BCP-MCNC: 4.2 G/DL (ref 3.5–5)
ALP SERPL-CCNC: 47 U/L (ref 46–116)
ALT SERPL W P-5'-P-CCNC: 18 U/L (ref 12–78)
ANION GAP SERPL CALCULATED.3IONS-SCNC: 4 MMOL/L (ref 4–13)
AST SERPL W P-5'-P-CCNC: 11 U/L (ref 5–45)
BACTERIA UR QL AUTO: NORMAL /HPF
BASOPHILS # BLD AUTO: 0.07 THOUSANDS/ΜL (ref 0–0.1)
BASOPHILS NFR BLD AUTO: 1 % (ref 0–1)
BILIRUB SERPL-MCNC: 0.97 MG/DL (ref 0.2–1)
BILIRUB UR QL STRIP: NEGATIVE
BUN SERPL-MCNC: 20 MG/DL (ref 5–25)
C3 SERPL-MCNC: 96.9 MG/DL (ref 90–180)
C4 SERPL-MCNC: 23 MG/DL (ref 10–40)
CALCIUM SERPL-MCNC: 9.2 MG/DL (ref 8.3–10.1)
CHLORIDE SERPL-SCNC: 111 MMOL/L (ref 100–108)
CLARITY UR: CLEAR
CO2 SERPL-SCNC: 24 MMOL/L (ref 21–32)
COLOR UR: YELLOW
CREAT SERPL-MCNC: 0.84 MG/DL (ref 0.6–1.3)
CREAT UR-MCNC: 106 MG/DL
CRP SERPL QL: <3 MG/L
EOSINOPHIL # BLD AUTO: 0.13 THOUSAND/ΜL (ref 0–0.61)
EOSINOPHIL NFR BLD AUTO: 2 % (ref 0–6)
ERYTHROCYTE [DISTWIDTH] IN BLOOD BY AUTOMATED COUNT: 11.9 % (ref 11.6–15.1)
ERYTHROCYTE [SEDIMENTATION RATE] IN BLOOD: <1 MM/HOUR (ref 0–19)
GFR SERPL CREATININE-BSD FRML MDRD: 92 ML/MIN/1.73SQ M
GLUCOSE SERPL-MCNC: 90 MG/DL (ref 65–140)
GLUCOSE UR STRIP-MCNC: NEGATIVE MG/DL
HCT VFR BLD AUTO: 36.9 % (ref 34.8–46.1)
HGB BLD-MCNC: 12.7 G/DL (ref 11.5–15.4)
HGB UR QL STRIP.AUTO: NEGATIVE
HYALINE CASTS #/AREA URNS LPF: NORMAL /LPF
IMM GRANULOCYTES # BLD AUTO: 0.07 THOUSAND/UL (ref 0–0.2)
IMM GRANULOCYTES NFR BLD AUTO: 1 % (ref 0–2)
KETONES UR STRIP-MCNC: NEGATIVE MG/DL
LEUKOCYTE ESTERASE UR QL STRIP: NEGATIVE
LYMPHOCYTES # BLD AUTO: 2.29 THOUSANDS/ΜL (ref 0.6–4.47)
LYMPHOCYTES NFR BLD AUTO: 27 % (ref 14–44)
MCH RBC QN AUTO: 32.5 PG (ref 26.8–34.3)
MCHC RBC AUTO-ENTMCNC: 34.4 G/DL (ref 31.4–37.4)
MCV RBC AUTO: 94 FL (ref 82–98)
MONOCYTES # BLD AUTO: 0.47 THOUSAND/ΜL (ref 0.17–1.22)
MONOCYTES NFR BLD AUTO: 6 % (ref 4–12)
NEUTROPHILS # BLD AUTO: 5.48 THOUSANDS/ΜL (ref 1.85–7.62)
NEUTS SEG NFR BLD AUTO: 63 % (ref 43–75)
NITRITE UR QL STRIP: NEGATIVE
NON-SQ EPI CELLS URNS QL MICRO: NORMAL /HPF
NRBC BLD AUTO-RTO: 0 /100 WBCS
PH UR STRIP.AUTO: 6 [PH]
PLATELET # BLD AUTO: 247 THOUSANDS/UL (ref 149–390)
PMV BLD AUTO: 11.5 FL (ref 8.9–12.7)
POTASSIUM SERPL-SCNC: 3.9 MMOL/L (ref 3.5–5.3)
PROT SERPL-MCNC: 7.4 G/DL (ref 6.4–8.2)
PROT UR STRIP-MCNC: NEGATIVE MG/DL
PROT UR-MCNC: 15 MG/DL
PROT/CREAT UR: 0.14 MG/G{CREAT} (ref 0–0.1)
RBC # BLD AUTO: 3.91 MILLION/UL (ref 3.81–5.12)
RBC #/AREA URNS AUTO: NORMAL /HPF
SODIUM SERPL-SCNC: 139 MMOL/L (ref 136–145)
SP GR UR STRIP.AUTO: 1.02 (ref 1–1.03)
UROBILINOGEN UR QL STRIP.AUTO: 0.2 E.U./DL
WBC # BLD AUTO: 8.51 THOUSAND/UL (ref 4.31–10.16)
WBC #/AREA URNS AUTO: NORMAL /HPF

## 2022-02-04 PROCEDURE — 81001 URINALYSIS AUTO W/SCOPE: CPT | Performed by: INTERNAL MEDICINE

## 2022-02-04 PROCEDURE — 80053 COMPREHEN METABOLIC PANEL: CPT | Performed by: INTERNAL MEDICINE

## 2022-02-04 PROCEDURE — 84156 ASSAY OF PROTEIN URINE: CPT | Performed by: INTERNAL MEDICINE

## 2022-02-04 PROCEDURE — 86664 EPSTEIN-BARR NUCLEAR ANTIGEN: CPT | Performed by: INTERNAL MEDICINE

## 2022-02-04 PROCEDURE — 82570 ASSAY OF URINE CREATININE: CPT | Performed by: INTERNAL MEDICINE

## 2022-02-04 PROCEDURE — 85652 RBC SED RATE AUTOMATED: CPT | Performed by: INTERNAL MEDICINE

## 2022-02-04 PROCEDURE — 85025 COMPLETE CBC W/AUTO DIFF WBC: CPT | Performed by: INTERNAL MEDICINE

## 2022-02-04 PROCEDURE — 86140 C-REACTIVE PROTEIN: CPT | Performed by: INTERNAL MEDICINE

## 2022-02-04 PROCEDURE — 86225 DNA ANTIBODY NATIVE: CPT | Performed by: INTERNAL MEDICINE

## 2022-02-04 PROCEDURE — 86665 EPSTEIN-BARR CAPSID VCA: CPT | Performed by: INTERNAL MEDICINE

## 2022-02-04 PROCEDURE — 86160 COMPLEMENT ANTIGEN: CPT | Performed by: INTERNAL MEDICINE

## 2022-02-04 PROCEDURE — 36415 COLL VENOUS BLD VENIPUNCTURE: CPT | Performed by: INTERNAL MEDICINE

## 2022-02-04 PROCEDURE — 86663 EPSTEIN-BARR ANTIBODY: CPT | Performed by: INTERNAL MEDICINE

## 2022-02-04 PROCEDURE — 99214 OFFICE O/P EST MOD 30 MIN: CPT | Performed by: INTERNAL MEDICINE

## 2022-02-04 RX ORDER — SULFASALAZINE 500 MG/1
TABLET ORAL
Qty: 120 TABLET | Refills: 3 | Status: SHIPPED | OUTPATIENT
Start: 2022-02-04 | End: 2022-02-04

## 2022-02-04 RX ORDER — MELOXICAM 15 MG/1
15 TABLET ORAL DAILY
Qty: 90 TABLET | Refills: 1 | Status: SHIPPED | OUTPATIENT
Start: 2022-02-04 | End: 2022-06-24 | Stop reason: SDUPTHER

## 2022-02-04 RX ORDER — SULFASALAZINE 500 MG/1
TABLET ORAL
Qty: 360 TABLET | Refills: 1 | Status: SHIPPED | OUTPATIENT
Start: 2022-02-04 | End: 2022-03-02 | Stop reason: ALTCHOICE

## 2022-02-04 RX ORDER — HYDROXYCHLOROQUINE SULFATE 200 MG/1
300 TABLET, FILM COATED ORAL DAILY
Qty: 135 TABLET | Refills: 1 | Status: SHIPPED | OUTPATIENT
Start: 2022-02-04 | End: 2022-06-24 | Stop reason: SDUPTHER

## 2022-02-04 RX ORDER — FAMOTIDINE 20 MG/1
20 TABLET, FILM COATED ORAL 2 TIMES DAILY
Qty: 180 TABLET | Refills: 1 | Status: ON HOLD | OUTPATIENT
Start: 2022-02-04 | End: 2022-06-03 | Stop reason: SDUPTHER

## 2022-02-04 RX ORDER — TIZANIDINE 2 MG/1
2 TABLET ORAL EVERY 8 HOURS PRN
Qty: 90 TABLET | Refills: 3 | Status: SHIPPED | OUTPATIENT
Start: 2022-02-04 | End: 2022-06-24 | Stop reason: ALTCHOICE

## 2022-02-04 NOTE — PROGRESS NOTES
Assessment and Plan: Tatiana Frias is a 32 y o   female who presents for follow-up of lupus (+DEBBY via IFA of 1:640 in speckled pattern, photosensitivity, arthralgia, myalgia, fatigue, mouth/nasal sores, sicca symptoms, Raynaud's symptoms, headaches, hair loss, fevers, and malar erythema)  Patient had worsening fatigue and general weakness in her body after she had COVID19 infection in beginning of January 2022  She was provided with course of prednisone 1 week of 20 mg, 1 week of 10 mg  Apparantly she was complaining of fatigue since summer  She continues to take vit D supplement that does not help with fatigue  Has history of IgA deficency  She had frequent sinus infections till the age of 22 but denies recent infections  May consider official allergy/immunology evaluation if suspicion for immunodeficiency  She continues to take hydroxychloroquine 200 mg daily and sulfasalazine 500 mg 2 tablets twice daily  Patient gets trigger point injections for her back pain  Muscle relaxant was not helping her, I will discontinue that  Last lab work in Oct 2021 was ok     I will repeat blood work  Patient was recommended to do EBV test to rule-out mono as contributing to her fatigue    Do lupus activity labs  Continue sulfasalazine 2 tabs twice a day  Continue hydroxychloroquine one and a half tabs daily  Continue meloxicam daily as needed  Continue famotidine twice a day as needed  Start tizanidine three times a day as needed for muscle spasm  Ophthalmology referral made    Return to clinic in 4 months    Plan:  Diagnoses and all orders for this visit:    Systemic lupus erythematosus with other organ involvement, unspecified SLE type [facility]  -     CBC and differential  -     Comprehensive metabolic panel  -     C-reactive protein  -     C4 complement  -     C3 complement  -     Anti-DNA antibody, double-stranded  -     Urinalysis with microscopic  -     Sedimentation rate, automated  -     Protein / creatinine ratio, urine    Undifferentiated connective tissue disease (HCC)  -     Discontinue: sulfaSALAzine (AZULFIDINE) 500 mg tablet; Take 1 tab daily for a week, then 1 tab twice a day for a week, then 2 tabs twice a day  -     hydroxychloroquine (PLAQUENIL) 200 mg tablet; Take 1 5 tablets (300 mg total) by mouth daily  -     famotidine (PEPCID) 20 mg tablet; Take 1 tablet (20 mg total) by mouth 2 (two) times a day  -     sulfaSALAzine (AZULFIDINE) 500 mg tablet; Take 2 tabs twice a day    Arthralgia, unspecified joint  -     Discontinue: sulfaSALAzine (AZULFIDINE) 500 mg tablet; Take 1 tab daily for a week, then 1 tab twice a day for a week, then 2 tabs twice a day  -     meloxicam (Mobic) 15 mg tablet; Take 1 tablet (15 mg total) by mouth daily  -     sulfaSALAzine (AZULFIDINE) 500 mg tablet; Take 2 tabs twice a day    Back pain of lumbar region with sciatica  -     tiZANidine (ZANAFLEX) 2 mg tablet; Take 1 tablet (2 mg total) by mouth every 8 (eight) hours as needed for muscle spasms    Raynaud's disease without gangrene    Other fatigue    High risk medication use  -     Ambulatory Referral to Ophthalmology; Future     High risk medication use - Benefits and risks of hydroxychloroquine, including but not limited to retinal toxicity, corneal deposits, gastrointestinal side effects, and headaches were previously discussed with the patient  She is aware of the need for a regular eye exam to monitor for ocular toxicity while on this medication  Follow-up plan: Return to clinic in 4 months        Rheumatic Disease Summary  Maria Isabel Koo  [facility] [address] is a 32 y o  female who originally presented on 8/28/20 as a Rheumatology consult referred by her PCP Fina Blackwood PA-C for evaluation of possible autoimmune disease  Patient admitted to fevers, photosensitivity, arthralgia, myalgia, fatigue, mouth/nasal sores, sicca symptoms, Raynaud's symptoms, headaches, and malar erythema   Extensive autoimmune disease lab and x-ray work-up ordered and returned unremarkable  Prescribed celecoxib 100mg po bid prn joint pain  Can also prescribe prednisone course to see if symptoms improve; if they do, then there likely is an inflammatory process going on  Next clinic visit was 10/9/20  It was felt that she had UCTD at the time  Also on differential was seronegative RA with sicca symptoms  Muscle enzymes and ACE level ordered to evaluate for myositis and sarcoidosis returned normal  Started patient on the DMARD hydroxychloroquine 200mg po daily; she was made aware to get regular eye exams while on this medication  She was to continue celecoxib 200mg po bid prn for joint pain  Put patient on a slower prednisone taper starting at 20mg po daily and to decrease by 2 5mg increments every week until off     3/12/21: Patient has developed hair loss and low-grade fevers in addition to her regular symptoms  Increased patient's HCQ to 300mg po daily  Started her on naproxen 500mg po bid prn for joint pain  She can take famotidine daily if she plans on taking naproxen daily  Her lupus activity labs returned unremarkable, but an DEBBY with IFA was also ordered this visit, and returned positive at 1:640 in a speckled pattern, so she likely does have SLE      6/18/21: Has a lot of fatigue; asked patient to exercise daily and take daily Vit  D 1,000-2,000 units a day  Continue famotidine daily for GI prophylaxis while on regular NSAIDs  Continue diclofenac twice a day as needed for joint pain  Continue 300mg po daily hydroxychloroquine and regular eye exams; last eye exam was in Feb 2021 and was normal    11/22/21:Her lupus symptoms seemed to be more active lately , especially had been having a lot of 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        Could take famotidine 1-2 times a day for GI prophylaxis while on regular NSAIDs  Stopped diclofenac since was no longer helpful for joint pain; started meloxicam daily as needed for joint pain  Continued 300mg po daily hydroxychloroquine and regular eye exams  Started sulfasalazine to ultimately increase to the therapeutic dose of 1,000mg po bid  Take prednisone 2 5mg po daily for a week to calm down her currently active symptoms  EBV panel ordered to check for mono as cause of her fatigue    VON Caldwell is a 32 y o   female who presents for follow-up of her newly confirmed SLE  Last clinic visit was 11/22/21  Patient started to see rheumatologyst in 2020  Patient had positive COVID19 home test in Jan  She was treated at home, she was sick for 3 weeks  Since then she continues to have generalized weakness and fatigue  She has got 1 week of prednisone 20 mg and 1 week of 10 mg  She says she was feeling better on 20 mg dose  Patient denies rash, sob, lightheadedness, visual changes, hearing problem  The following portions of the patient's history were reviewed and updated as appropriate: allergies, current medications, past family history, past medical history, past social history, past surgical history and problem list     Review of Systems:   Review of Systems   Constitutional: Positive for fatigue and fever  Negative for chills  HENT: Positive for mouth sores  Negative for congestion, hearing loss and trouble swallowing  Eyes: Negative  Negative for redness  Respiratory: Negative for cough, chest tightness, shortness of breath and wheezing  Cardiovascular: Negative for chest pain, palpitations and leg swelling  Gastrointestinal: Negative for abdominal pain, blood in stool, constipation, diarrhea and nausea  Endocrine: Negative  Genitourinary: Positive for frequency  Negative for dysuria, flank pain and hematuria  Musculoskeletal: Positive for arthralgias, back pain, joint swelling, myalgias and neck pain  Skin: Negative for pallor and rash  Allergic/Immunologic: Negative  Neurological: Positive for dizziness, tremors and headaches  Negative for numbness  Hematological: Negative  Psychiatric/Behavioral: Negative for suicidal ideas  The patient is not nervous/anxious          Home Medications:    Current Outpatient Medications:     amphetamine-dextroamphetamine (ADDERALL) 20 mg tablet, Take 20 mg by mouth daily , Disp: , Rfl: 0    azelastine (ASTELIN) 0 1 % nasal spray, 2 sprays into each nostril 2 (two) times a day Use in each nostril as directed, Disp: 30 mL, Rfl: 3    buPROPion (WELLBUTRIN SR) 150 mg 12 hr tablet, Take 150 mg by mouth 2 (two) times a day , Disp: , Rfl:     cyclobenzaprine (FLEXERIL) 5 mg tablet, Take 2 tablets (10 mg total) by mouth daily at bedtime (Patient taking differently: Take 10 mg by mouth as needed ), Disp: 180 tablet, Rfl: 3    dexamethasone (DECADRON) 1 mg tablet, Take 1 tablet (1 mg total) by mouth daily with breakfast (Patient not taking: Reported on 10/1/2021), Disp: 10 tablet, Rfl: 0    dexamethasone (DECADRON) 1 mg tablet, Take 1 tablet (1 mg total) by mouth as needed (migraine), Disp: 10 tablet, Rfl: 2    diazepam (VALIUM) 5 mg tablet, Take 5 mg by mouth every 6 (six) hours as needed , Disp: , Rfl:     famotidine (PEPCID) 20 mg tablet, Take 1 tablet (20 mg total) by mouth 2 (two) times a day, Disp: 180 tablet, Rfl: 1    fexofenadine (ALLEGRA) 180 MG tablet, Take 180 mg by mouth daily (Patient not taking: Reported on 10/1/2021), Disp: , Rfl:     FLUoxetine (PROzac) 10 mg capsule, Take 10 mg by mouth daily , Disp: , Rfl:     hydroxychloroquine (PLAQUENIL) 200 mg tablet, Take 1 5 tablets (300 mg total) by mouth daily, Disp: 135 tablet, Rfl: 1    Ibuprofen 200 MG CAPS, Take 800 mg by mouth every 6 (six) hours as needed, Disp: , Rfl:     ketorolac (TORADOL) 10 mg tablet, Take 1 tablet (10 mg total) by mouth every 6 (six) hours as needed for moderate pain, Disp: 10 tablet, Rfl: 3    lamoTRIgine (LaMICtal) 150 MG tablet, Take 150 mg by mouth daily , Disp: , Rfl:     levonorgestrel (KYLEENA) 19 5 MG intrauterine device, 1 Intra Uterine Device by Intrauterine route once, Disp: , Rfl:     levothyroxine 100 mcg tablet, TAKE ONE TABLET BY MOUTH ONCE DAILY, Disp: 90 tablet, Rfl: 3    meloxicam (Mobic) 15 mg tablet, Take 1 tablet (15 mg total) by mouth daily, Disp: 90 tablet, Rfl: 1    mupirocin (BACTROBAN) 2 % ointment, Apply topically 3 (three) times a day (Patient taking differently: Apply 1 application topically 3 (three) times a day as needed ), Disp: 30 g, Rfl: 5    nitrofurantoin (MACROBID) 100 mg capsule, Take 1 capsule (100 mg total) by mouth 2 (two) times a day (Patient not taking: Reported on 10/1/2021), Disp: 7 capsule, Rfl: 0    nitrofurantoin (MACRODANTIN) 50 mg capsule, Take 1 capsule (50 mg total) by mouth daily at bedtime as needed (after intercourse) (Patient not taking: Reported on 4/16/2021), Disp: 14 capsule, Rfl: 1    OLANZapine-FLUoxetine (SYMBYAX) 3-25 MG per capsule, Take 1 capsule by mouth every evening, Disp: , Rfl:     Plecanatide (Trulance) 3 MG TABS, Take 1 tablet by mouth daily (Patient not taking: Reported on 10/1/2021), Disp: 30 tablet, Rfl: 5    polyethylene glycol (MIRALAX) 17 g packet, Take 17 g by mouth daily (Patient not taking: Reported on 10/1/2021), Disp: , Rfl:     predniSONE 2 5 mg tablet, Take 1 tablet (2 5 mg total) by mouth daily, Disp: 7 tablet, Rfl: 0    prochlorperazine (COMPAZINE) 10 mg tablet, Take 1 tablet (10 mg total) by mouth every 6 (six) hours as needed (migraine), Disp: 10 tablet, Rfl: 0    Rimegepant Sulfate (Nurtec) 75 MG TBDP, Take 75 mg by mouth as needed (migraine), Disp: 8 tablet, Rfl: 3    Rimegepant Sulfate (Nurtec) 75 MG TBDP, Take 75 mg by mouth every other day (Patient not taking: Reported on 6/18/2021), Disp: 16 tablet, Rfl: 11    sulfaSALAzine (AZULFIDINE) 500 mg tablet, Take 2 tabs twice a day, Disp: 360 tablet, Rfl: 1    SUMAtriptan (IMITREX) 100 mg tablet, Take 1 tablet (100 mg total) by mouth as needed for migraine (migraine), Disp: 27 tablet, Rfl: 0    tiZANidine (ZANAFLEX) 2 mg tablet, Take 1 tablet (2 mg total) by mouth every 8 (eight) hours as needed for muscle spasms, Disp: 90 tablet, Rfl: 3    topiramate (TOPAMAX) 50 MG tablet, 1 tab in the am and 2 tabs at bedtime, Disp: 270 tablet, Rfl: 3    Ubrogepant (UBRELVY) 100 MG tablet, Take 1 tablet (100 mg) one time as needed for migraine  May repeat one additional tablet (100 mg) at least two hours after the first dose  Do not use more than two doses per day, or for more than 10 days per month , Disp: 10 tablet, Rfl: 3    Objective:    Vitals:    02/07/22 0649   BP: 127/88   Pulse: (!) 120   Weight: 62 2 kg (137 lb 3 2 oz)   Height: 5' (1 524 m)       Physical Exam  Vitals reviewed  Constitutional:       General: She is not in acute distress  Appearance: Normal appearance  She is not ill-appearing or toxic-appearing  HENT:      Head: Normocephalic and atraumatic  Nose: Nose normal       Mouth/Throat:      Mouth: Mucous membranes are moist    Eyes:      General: No scleral icterus  Extraocular Movements: Extraocular movements intact  Conjunctiva/sclera: Conjunctivae normal    Cardiovascular:      Rate and Rhythm: Normal rate and regular rhythm  Pulses: Normal pulses  Heart sounds: Normal heart sounds  No murmur heard  No gallop  Pulmonary:      Effort: Pulmonary effort is normal  No respiratory distress  Breath sounds: Normal breath sounds  No wheezing or rales  Musculoskeletal:         General: Tenderness (left shoulder tenderness) present  No swelling  Right lower leg: No edema  Left lower leg: No edema  Skin:     General: Skin is warm  Coloration: Skin is not jaundiced  Findings: No erythema or rash  Neurological:      General: No focal deficit present  Mental Status: She is alert and oriented to person, place, and time  Cranial Nerves: No cranial nerve deficit  Motor: No weakness     Psychiatric:         Mood and Affect: Mood normal          Behavior: Behavior normal        Reviewed labs and imaging  Imaging:   XR right foot 11/29/21  No acute osseous abnormality  XR right ankle 10/18/21  No acute osseous abnormality  XR right foot 10/18/21  No acute osseous abnormality  CXR 7/27/21  No acute cardiopulmonary disease      Bilateral Hand and SI joint x-rays 8/28/20  unremarkable    Labs:   Office Visit on 02/04/2022   Component Date Value Ref Range Status    WBC 02/04/2022 8 51  4 31 - 10 16 Thousand/uL Final    RBC 02/04/2022 3 91  3 81 - 5 12 Million/uL Final    Hemoglobin 02/04/2022 12 7  11 5 - 15 4 g/dL Final    Hematocrit 02/04/2022 36 9  34 8 - 46 1 % Final    MCV 02/04/2022 94  82 - 98 fL Final    MCH 02/04/2022 32 5  26 8 - 34 3 pg Final    MCHC 02/04/2022 34 4  31 4 - 37 4 g/dL Final    RDW 02/04/2022 11 9  11 6 - 15 1 % Final    MPV 02/04/2022 11 5  8 9 - 12 7 fL Final    Platelets 13/76/9798 247  149 - 390 Thousands/uL Final    nRBC 02/04/2022 0  /100 WBCs Final    Neutrophils Relative 02/04/2022 63  43 - 75 % Final    Immat GRANS % 02/04/2022 1  0 - 2 % Final    Lymphocytes Relative 02/04/2022 27  14 - 44 % Final    Monocytes Relative 02/04/2022 6  4 - 12 % Final    Eosinophils Relative 02/04/2022 2  0 - 6 % Final    Basophils Relative 02/04/2022 1  0 - 1 % Final    Neutrophils Absolute 02/04/2022 5 48  1 85 - 7 62 Thousands/µL Final    Immature Grans Absolute 02/04/2022 0 07  0 00 - 0 20 Thousand/uL Final    Lymphocytes Absolute 02/04/2022 2 29  0 60 - 4 47 Thousands/µL Final    Monocytes Absolute 02/04/2022 0 47  0 17 - 1 22 Thousand/µL Final    Eosinophils Absolute 02/04/2022 0 13  0 00 - 0 61 Thousand/µL Final    Basophils Absolute 02/04/2022 0 07  0 00 - 0 10 Thousands/µL Final    Sodium 02/04/2022 139  136 - 145 mmol/L Final    Potassium 02/04/2022 3 9  3 5 - 5 3 mmol/L Final    Chloride 02/04/2022 111* 100 - 108 mmol/L Final    CO2 02/04/2022 24  21 - 32 mmol/L Final    ANION GAP 02/04/2022 4  4 - 13 mmol/L Final    BUN 02/04/2022 20  5 - 25 mg/dL Final    Creatinine 02/04/2022 0 84  0 60 - 1 30 mg/dL Final    Standardized to IDMS reference method    Glucose 02/04/2022 90  65 - 140 mg/dL Final    If the patient is fasting, the ADA then defines impaired fasting glucose as > 100 mg/dL and diabetes as > or equal to 123 mg/dL  Specimen collection should occur prior to Sulfasalazine administration due to the potential for falsely depressed results  Specimen collection should occur prior to Sulfapyridine administration due to the potential for falsely elevated results   Calcium 02/04/2022 9 2  8 3 - 10 1 mg/dL Final    AST 02/04/2022 11  5 - 45 U/L Final    Specimen collection should occur prior to Sulfasalazine administration due to the potential for falsely depressed results   ALT 02/04/2022 18  12 - 78 U/L Final    Specimen collection should occur prior to Sulfasalazine and/or Sulfapyridine administration due to the potential for falsely depressed results   Alkaline Phosphatase 02/04/2022 47  46 - 116 U/L Final    Total Protein 02/04/2022 7 4  6 4 - 8 2 g/dL Final    Albumin 02/04/2022 4 2  3 5 - 5 0 g/dL Final    Total Bilirubin 02/04/2022 0 97  0 20 - 1 00 mg/dL Final    Use of this assay is not recommended for patients undergoing treatment with eltrombopag due to the potential for falsely elevated results      eGFR 02/04/2022 92  ml/min/1 73sq m Final    CRP 02/04/2022 <3 0  <3 0 mg/L Final    C4, COMPLEMENT 02/04/2022 23 0  10 0 - 40 0 mg/dL Final    C3 Complement 02/04/2022 96 9  90 0 - 180 0 mg/dL Final    Clarity, UA 02/04/2022 Clear   Final    Color, UA 02/04/2022 Yellow   Final    Specific Gravity, UA 02/04/2022 1 025  1 003 - 1 030 Final    pH, UA 02/04/2022 6 0  4 5, 5 0, 5 5, 6 0, 6 5, 7 0, 7 5, 8 0 Final    Glucose, UA 02/04/2022 Negative  Negative mg/dl Final    Ketones, UA 02/04/2022 Negative  Negative mg/dl Final    Blood, UA 02/04/2022 Negative   Final    Protein, UA 02/04/2022 Negative  Negative mg/dl Final    Nitrite, UA 02/04/2022 Negative  Negative Final    Bilirubin, UA 02/04/2022 Negative  Negative Final    Urobilinogen, UA 02/04/2022 0 2  0 2, 1 0 E U /dl E U /dl Final    Leukocytes, UA 02/04/2022 Negative  Negative Final    WBC, UA 02/04/2022 2-4  None Seen, 2-4, 5-60 /hpf Final    RBC, UA 02/04/2022 2-4  None Seen, 2-4 /hpf Final    Hyaline Casts, UA 02/04/2022 None Seen  None Seen /lpf Final    Bacteria, UA 02/04/2022 Occasional  None Seen, Occasional /hpf Final    Epithelial Cells 02/04/2022 None Seen  None Seen, Occasional /hpf Final    Sed Rate 02/04/2022 <1  0 - 19 mm/hour Final    Creatinine, Ur 02/04/2022 106 0  mg/dL Final    Protein Urine Random 02/04/2022 15  mg/dL Final    Prot/Creat Ratio, Ur 02/04/2022 0 14* 0 00 - 0 10 Final   Orders Only on 11/02/2021   Component Date Value Ref Range Status    SARS-CoV-2 11/02/2021 Negative  Negative Final   Lab on 10/07/2021   Component Date Value Ref Range Status    TSH 3RD GENERATON 10/07/2021 1 480  0 358 - 3 740 uIU/mL Final    The recommended reference ranges for TSH during pregnancy are as follows:   First trimester 0 1 to 2 5 uIU/mL   Second trimester  0 2 to 3 0 uIU/mL   Third trimester 0 3 to 3 0 uIU/m    Note: Normal ranges may not apply to patients who are transgender, non-binary, or whose legal sex, sex at birth, and gender identity differ     Orders Only on 10/07/2021   Component Date Value Ref Range Status    WBC 10/07/2021 6 07  4 31 - 10 16 Thousand/uL Final    RBC 10/07/2021 4 14  3 81 - 5 12 Million/uL Final    Hemoglobin 10/07/2021 12 7  11 5 - 15 4 g/dL Final    Hematocrit 10/07/2021 38 3  34 8 - 46 1 % Final    MCV 10/07/2021 93  82 - 98 fL Final    MCH 10/07/2021 30 7  26 8 - 34 3 pg Final    MCHC 10/07/2021 33 2  31 4 - 37 4 g/dL Final    RDW 10/07/2021 11 5* 11 6 - 15 1 % Final    MPV 10/07/2021 12 4  8 9 - 12 7 fL Final    Platelets 51/44/9213 210  149 - 390 Thousands/uL Final    nRBC 10/07/2021 0  /100 WBCs Final    Neutrophils Relative 10/07/2021 65  43 - 75 % Final    Immat GRANS % 10/07/2021 1  0 - 2 % Final    Lymphocytes Relative 10/07/2021 25  14 - 44 % Final    Monocytes Relative 10/07/2021 5  4 - 12 % Final    Eosinophils Relative 10/07/2021 3  0 - 6 % Final    Basophils Relative 10/07/2021 1  0 - 1 % Final    Neutrophils Absolute 10/07/2021 3 93  1 85 - 7 62 Thousands/µL Final    Immature Grans Absolute 10/07/2021 0 07  0 00 - 0 20 Thousand/uL Final    Lymphocytes Absolute 10/07/2021 1 52  0 60 - 4 47 Thousands/µL Final    Monocytes Absolute 10/07/2021 0 32  0 17 - 1 22 Thousand/µL Final    Eosinophils Absolute 10/07/2021 0 16  0 00 - 0 61 Thousand/µL Final    Basophils Absolute 10/07/2021 0 07  0 00 - 0 10 Thousands/µL Final    Sodium 10/07/2021 139  136 - 145 mmol/L Final    Potassium 10/07/2021 4 2  3 5 - 5 3 mmol/L Final    Chloride 10/07/2021 112* 100 - 108 mmol/L Final    CO2 10/07/2021 24  21 - 32 mmol/L Final    ANION GAP 10/07/2021 3* 4 - 13 mmol/L Final    BUN 10/07/2021 12  5 - 25 mg/dL Final    Creatinine 10/07/2021 0 95  0 60 - 1 30 mg/dL Final    Standardized to IDMS reference method    Glucose, Fasting 10/07/2021 94  65 - 99 mg/dL Final    Specimen collection should occur prior to Sulfasalazine administration due to the potential for falsely depressed results  Specimen collection should occur prior to Sulfapyridine administration due to the potential for falsely elevated results   Calcium 10/07/2021 9 3  8 3 - 10 1 mg/dL Final    AST 10/07/2021 6  5 - 45 U/L Final    Specimen collection should occur prior to Sulfasalazine administration due to the potential for falsely depressed results   ALT 10/07/2021 13  12 - 78 U/L Final    Specimen collection should occur prior to Sulfasalazine and/or Sulfapyridine administration due to the potential for falsely depressed results       Alkaline Phosphatase 10/07/2021 46  46 - 116 U/L Final  Total Protein 10/07/2021 6 7  6 4 - 8 2 g/dL Final    Albumin 10/07/2021 3 5  3 5 - 5 0 g/dL Final    Total Bilirubin 10/07/2021 0 28  0 20 - 1 00 mg/dL Final    Use of this assay is not recommended for patients undergoing treatment with eltrombopag due to the potential for falsely elevated results      eGFR 10/07/2021 80  ml/min/1 73sq m Final    CRP 10/07/2021 <3 0  <3 0 mg/L Final    Sed Rate 10/07/2021 <1  0 - 19 mm/hour Final    Creatinine, Ur 10/07/2021 32 8  mg/dL Final    Protein Urine Random 10/07/2021 6  mg/dL Final    Prot/Creat Ratio, Ur 10/07/2021 0 18* 0 00 - 0 10 Final    Clarity, UA 10/07/2021 Clear   Final    Color, UA 10/07/2021 Yellow   Final    Specific [place], UA 10/07/2021 1 008  1 003 - 1 030 Final    pH, UA 10/07/2021 7 5  4 5, 5 0, 5 5, 6 0, 6 5, 7 0, 7 5, 8 0 Final    Glucose, UA 10/07/2021 Negative  Negative mg/dl Final    Ketones, UA 10/07/2021 Negative  Negative mg/dl Final    Blood, UA 10/07/2021 Negative  Negative Final    Protein, UA 10/07/2021 Negative  Negative mg/dl Final    Nitrite, UA 10/07/2021 Negative  Negative Final    Bilirubin, UA 10/07/2021 Negative  Negative Final    Urobilinogen, UA 10/07/2021 0 2  0 2, 1 0 E U /dl E U /dl Final    Leukocytes, UA 10/07/2021 Negative  Negative Final    WBC, UA 10/07/2021 None Seen  None Seen, 2-4, 5-60 /hpf Final    RBC, UA 10/07/2021 None Seen  None Seen, 2-4 /hpf Final    Hyaline Casts, UA 10/07/2021 None Seen  None Seen /lpf Final    Bacteria, UA 10/07/2021 Occasional  None Seen, Occasional /hpf Final    Epithelial Cells 10/07/2021 None Seen  None Seen, Occasional /hpf Final    C4, COMPLEMENT 10/07/2021 18 0  10 0 - 40 0 mg/dL Final    C3 Complement 10/07/2021 94 4  90 0 - 180 0 mg/dL Final    ds DNA Ab 10/07/2021 1  0 - 9 IU/mL Final                                       Negative      <5                                     Equivocal  5 - 9                                     Positive      >9   Office Visit on 07/27/2021   Component Date Value Ref Range Status    SARS-CoV-2 07/27/2021 Negative  Negative Final   Appointment on 06/18/2021   Component Date Value Ref Range Status    Hemoglobin A1C 06/18/2021 4 8  Normal 3 8-5 6%; PreDiabetic 5 7-6 4%; Diabetic >=6 5%; Glycemic control for adults with diabetes <7 0% % Final    EAG 06/18/2021 91  mg/dl Final    Cholesterol 06/18/2021 231* 50 - 200 mg/dL Final    Cholesterol:       Desirable         <200 mg/dl       Borderline         200-239 mg/dl       High              >239           Triglycerides 06/18/2021 51  <=150 mg/dL Final    Triglyceride:     Normal          <150 mg/dl     Borderline High 150-199 mg/dl     High            200-499 mg/dl        Very High       >499 mg/dl    Specimen collection should occur prior to N-Acetylcysteine or Metamizole administration due to the potential for falsely depressed results   HDL, Direct 06/18/2021 60  >=40 mg/dL Final    HDL Cholesterol:       Low     <41 mg/dL  Specimen collection should occur prior to Metamizole administration due to the potential for falsley depressed results   LDL Calculated 06/18/2021 161* 0 - 100 mg/dL Final    LDL Cholesterol:     Optimal           <100 mg/dl     Near Optimal      100-129 mg/dl     Above Optimal       Borderline High 130-159 mg/dl       High            160-189 mg/dl       Very High       >189 mg/dl         This screening LDL is a calculated result  It does not have the accuracy of the Direct Measured LDL in the monitoring of patients with hyperlipidemia and/or statin therapy  Direct Measure LDL (XNI233) must be ordered separately in these patients      Non-HDL-Chol (CHOL-HDL) 06/18/2021 171  mg/dl Final   Hospital Outpatient Visit on 05/12/2021   Component Date Value Ref Range Status    EXT Preg Test, Ur 05/12/2021 Negative  Negative Final    Control 05/12/2021 Valid  Valid Final    Case Report 05/12/2021    Final                    Value:Surgical Pathology Report Case: [id number]                                   Authorizing Provider:  Shruthi Lombardi MD          Collected:           05/12/2021 1025              Ordering Location:     [facility]        Received:            05/12/2021 [facility]                                                     Pathologist:           Agus Blank MD                                                          Specimens:   A) - Duodenum, bx, R/O celiac                                                                       B) - Stomach, gastric bx, R/O H  pylori                                                             C) - Esophagus, bx, R/O E O E  D) - Colon, random colon bx, R/O microscopic colitis                                       Final Diagnosis 05/12/2021    Final                    Value: This result contains rich text formatting which cannot be displayed here   Additional Information 05/12/2021    Final                    Value: This result contains rich text formatting which cannot be displayed here  [facility] Description 05/12/2021    Final                    Value: This result contains rich text formatting which cannot be displayed here     Office Visit on 03/12/2021   Component Date Value Ref Range Status    WBC 03/12/2021 6 42  4 31 - 10 16 Thousand/uL Final    RBC 03/12/2021 4 53  3 81 - 5 12 Million/uL Final    Hemoglobin 03/12/2021 14 4  11 5 - 15 4 g/dL Final    Hematocrit 03/12/2021 40 4  34 8 - 46 1 % Final    MCV 03/12/2021 89  82 - 98 fL Final    MCH 03/12/2021 31 8  26 8 - 34 3 pg Final    MCHC 03/12/2021 35 6  31 4 - 37 4 g/dL Final    RDW 03/12/2021 11 2* 11 6 - 15 1 % Final    MPV 03/12/2021 12 4  8 9 - 12 7 fL Final    Platelets 39/13/2587 254  149 - 390 Thousands/uL Final    nRBC 03/12/2021 0  /100 WBCs Final    Neutrophils Relative 03/12/2021 50 43 - 75 % Final    Immat GRANS % 03/12/2021 0  0 - 2 % Final    Lymphocytes Relative 03/12/2021 39  14 - 44 % Final    Monocytes Relative 03/12/2021 6  4 - 12 % Final    Eosinophils Relative 03/12/2021 4  0 - 6 % Final    Basophils Relative 03/12/2021 1  0 - 1 % Final    Neutrophils Absolute 03/12/2021 3 16  1 85 - 7 62 Thousands/µL Final    Immature Grans Absolute 03/12/2021 0 02  0 00 - 0 20 Thousand/uL Final    Lymphocytes Absolute 03/12/2021 2 53  0 60 - 4 47 Thousands/µL Final    Monocytes Absolute 03/12/2021 0 39  0 17 - 1 22 Thousand/µL Final    Eosinophils Absolute 03/12/2021 0 23  0 00 - 0 61 Thousand/µL Final    Basophils Absolute 03/12/2021 0 09  0 00 - 0 10 Thousands/µL Final    Sodium 03/12/2021 142  136 - 145 mmol/L Final    Potassium 03/12/2021 3 7  3 5 - 5 3 mmol/L Final    Chloride 03/12/2021 112* 100 - 108 mmol/L Final    CO2 03/12/2021 23  21 - 32 mmol/L Final    ANION GAP 03/12/2021 7  4 - 13 mmol/L Final    BUN 03/12/2021 14  5 - 25 mg/dL Final    Creatinine 03/12/2021 1 06  0 60 - 1 30 mg/dL Final    Standardized to IDMS reference method    Glucose 03/12/2021 86  65 - 140 mg/dL Final    If the patient is fasting, the ADA then defines impaired fasting glucose as > 100 mg/dL and diabetes as > or equal to 123 mg/dL  Specimen collection should occur prior to Sulfasalazine administration due to the potential for falsely depressed results  Specimen collection should occur prior to Sulfapyridine administration due to the potential for falsely elevated results   Calcium 03/12/2021 9 0  8 3 - 10 1 mg/dL Final    AST 03/12/2021 8  5 - 45 U/L Final    Specimen collection should occur prior to Sulfasalazine administration due to the potential for falsely depressed results   ALT 03/12/2021 21  12 - 78 U/L Final    Specimen collection should occur prior to Sulfasalazine and/or Sulfapyridine administration due to the potential for falsely depressed results       Alkaline Phosphatase 03/12/2021 55  46 - 116 U/L Final    Total Protein 03/12/2021 7 5  6 4 - 8 2 g/dL Final    Albumin 03/12/2021 4 3  3 5 - 5 0 g/dL Final    Total Bilirubin 03/12/2021 0 37  0 20 - 1 00 mg/dL Final    Use of this assay is not recommended for patients undergoing treatment with eltrombopag due to the potential for falsely elevated results      eGFR 03/12/2021 70  ml/min/1 73sq m Final    CRP 03/12/2021 <3 0  <3 0 mg/L Final    Sed Rate 03/12/2021 1  0 - 19 mm/hour Final    Creatinine, Ur 03/12/2021 155 0  mg/dL Final    Protein Urine Random 03/12/2021 18  mg/dL Final    Prot/Creat Ratio, Ur 03/12/2021 0 12* 0 00 - 0 10 Final    Clarity, UA 03/12/2021 Clear   Final    Color, UA 03/12/2021 Yellow   Final    Specific Gravity, UA 03/12/2021 1 020  1 003 - 1 030 Final    pH, UA 03/12/2021 6 5  4 5, 5 0, 5 5, 6 0, 6 5, 7 0, 7 5, 8 0 Final    Glucose, UA 03/12/2021 Negative  Negative mg/dl Final    Ketones, UA 03/12/2021 Negative  Negative mg/dl Final    Blood, UA 03/12/2021 Negative  Negative Final    Protein, UA 03/12/2021 Negative  Negative mg/dl Final    Nitrite, UA 03/12/2021 Negative  Negative Final    Bilirubin, UA 03/12/2021 Negative  Negative Final    Urobilinogen, UA 03/12/2021 0 2  0 2, 1 0 E U /dl E U /dl Final    Leukocytes, UA 03/12/2021 Negative  Negative Final    WBC, UA 03/12/2021 None Seen  None Seen, 2-4 /hpf Final    RBC, UA 03/12/2021 None Seen  None Seen, 2-4 /hpf Final    Hyaline Casts, UA 03/12/2021 None Seen  None Seen /lpf Final    Bacteria, UA 03/12/2021 None Seen  None Seen, Occasional /hpf Final    Epithelial Cells 03/12/2021 None Seen  None Seen, Occasional /hpf Final    C4, COMPLEMENT 03/12/2021 26 0  10 0 - 40 0 mg/dL Final    C3 Complement 03/12/2021 102 0  90 0 - 180 0 mg/dL Final    ds DNA Ab 03/12/2021 1  0 - 9 IU/mL Final                                       Negative      <5                                     Equivocal  5 - 9 Positive      >9    Miscellaneous Lab Test Result 03/12/2021 SEE WRITTEN REPORT   Final   Lab on 02/16/2021   Component Date Value Ref Range Status    TSH 3RD GENERATON 02/16/2021 3 330  0 358 - 3 740 uIU/mL Final    The recommended reference ranges for TSH during pregnancy are as follows:   First trimester 0 1 to 2 5 uIU/mL   Second trimester  0 2 to 3 0 uIU/mL   Third trimester 0 3 to 3 0 uIU/m    Note: Normal ranges may not apply to patients who are transgender, non-binary, or whose legal sex, sex at birth, and gender identity differ      Vit D, 25-Hydroxy 02/16/2021 36 1  30 0 - 100 0 ng/mL Final

## 2022-02-04 NOTE — PATIENT INSTRUCTIONS
Do labs  Continue sulfasalazine 2 tabs twice a day  Continue hydroxychloroquine one and a half tabs daily  Continue meloxicam daily as needed  Continue famotidine twice a day as needed  Start tizanidine three times a day as needed for muscle spasm  Ophthalmology referral made    Return to clinic in 4 months    Lupus Erythematosus   WHAT YOU NEED TO KNOW:   What is lupus? Lupus is an autoimmune inflammatory disease  This means that your immune system starts to attack your body instead of harmful germs  It is also called systemic lupus erythematosus  Lupus is a lifelong disease that affects all parts of your body  Lupus has active and quiet periods  The active periods, also called flares, are when you have symptoms  The quiet periods, or remission, are when you have few or no symptoms  A remission period may last months or years, or you may not have remission periods at all  What increases my risk for lupus? The cause of lupus is unknown  You are at increased risk if you are female, take hormones, or have a family member with lupus  Certain medicines, such as hydralazine and minocycline, can increase your risk for lupus  Ask your healthcare provider for more information about what increases your risk for lupus  What are the signs and symptoms of lupus? · Fever over 100°F (38°C)    · Tiredness, weight loss, or headache    · Rash shaped like butterfly wings    · Sensitivity to sunlight    · Hair loss    · Mouth or nose sores    · Painful joints    · Chest pain or cough when you take a deep breath    · Abdominal pain, nausea, or vomiting    How is lupus diagnosed? · Blood tests:  Your blood will be tested for infection, inflammation, or anemia (low red blood cells)  · Urine tests:  Your urine will be tested for protein or blood  · X-rays: This is a picture of your joints or chest to check for infection or extra fluid      · Biopsy:  Tissue may be taken from your skin, muscle, or kidney to check for the cause of your lupus  How is lupus treated? There is no cure for lupus  Lupus may be triggered by stress, ultraviolet light, or an infection, such as a cold  It can also be triggered by cigarette smoke or foods you eat  The following will help control your symptoms:  · Antimalarial medicine: This is used to relieve your joint and skin symptoms of lupus, such as rash and joint pain  · Steroids: These decrease inflammation  They may be given as a pill, IV, or ointment  · NSAIDs:  These decrease swelling, pain, and fever  NSAIDs are available without a doctor's order  Ask your healthcare provider which medicine is right for you  Ask how much to take and when to take it  Take as directed  NSAIDs can cause stomach bleeding and kidney problems if not taken correctly  · Immunosuppressive medicine: This is used to slow down your immune system  This will help your immune system not attack your body  · Cytotoxic medicine: This is used to decrease inflammation in muscles or joints  It also slows down your immune system  What are the risks of lupus? · You may be so tired that you cannot work at times  Your risk for a serious infection is increased  You may develop vision loss  You may become depressed or anxious  Your fingers may turn pale or blue when they are cold  This is called Reynaud syndrome  You may become forgetful or have trouble concentrating  You may develop headaches, vision problems, or have a seizure  · You may develop kidney disease or kidney failure  You may have high blood pressure or narrowing of your arteries  This can lead to heart disease or heart failure  You may have bleeding problems, such as anemia  You may get a blood clot in your leg  The clot may travel to your heart or brain and cause life-threatening problems, such as a heart attack or stroke  How can I manage my symptoms? · Rest:  Rest when you feel it is needed  Slowly start to do more each day   Return to your daily activities as directed  · Protect your skin from UV light:  Sunlight can make your lupus symptoms worse  Avoid the sun between 10 am and 4 pm, when the rays are strongest  Apply sunscreen with a SPF of 30 or more every 2 hours when you are outside  Do this even on cloudy days  Wear pants and long sleeves to cover your body  A hat with a wide brim can protect your face, head, and neck  · Heat:  Heat helps decrease joint pain or swelling  Apply heat on the painful joint for 20 to 30 minutes every 2 hours for as many days as directed  · Ice:  Ice helps decrease swelling and pain  Ice may also help prevent tissue damage  Use an ice pack, or put crushed ice in a plastic bag  Cover it with a towel and place it on the painful area for 15 to 20 minutes every hour or as directed  · Avoid others who are sick:  You are at increased risk of a severe infection  · Treat flares quickly: This will help prevent serious illness  How can I help prevent a lupus flare? · Eat healthy foods:  Healthy foods include fruits, vegetables, whole-grain breads, low-fat dairy products, beans, lean meats, and fish  Ask if you need to be on a special diet  · Exercise: This will help decrease your symptoms and prevent depression  Ask your healthcare provider about the best exercise plan for you  · Maintain a healthy weight:  Ask your healthcare provider how much you should weigh  Ask him to help you create a weight loss plan if you are overweight  · Do not smoke: If you smoke, it is never too late to quit  Ask for information about how to stop smoking if you need help  · Manage your stress:  Stress may slow healing and lead to illness  Learn ways to control stress, such as relaxation, deep breathing, and music  Talk to someone about things that upset you  Where can I find support and more information?    · L[facility] of [address] , [address] , [address]  Phone: 1- Dottie Ely  Phone: 4- 117 - 467-2744  Web Address: [employer]  org    · [employer] [facility] [employer] and [address] , [address]  Phone: 1- 390 - 430-7337  Phone: 9- 167 - [phone]  Web Address: [employer] [facility] gov    When should I contact my healthcare provider? · You have a flare of your lupus symptoms  · You have a fever or headache  · You feel like you are starting to get sick  · You start to urinate less than usual     · You are bleeding from your nose or gums  · You bruise easily  · You have questions or concerns about your condition or care  When should I seek immediate care or call 911? · You have blood in your urine, bowel movement, or vomit  · You have severe abdominal pain  · You are confused or feel dizzy or faint  · You have numbness or weakness of your face or limbs, or have trouble seeing or speaking  · You have a seizure  · You have new, sudden vision changes  · You have trouble breathing  · You have chest pain, pressure, or discomfort that may spread to your arms, jaw, or back  · Your leg feels warm, tender, and painful  It may look swollen and red  · You suddenly feel lightheaded and short of breath  · You have chest pain when you take a deep breath or cough  · You cough up blood  CARE AGREEMENT:   You have the right to help plan your care  Learn about your health condition and how it may be treated  Discuss treatment options with your healthcare providers to decide what care you want to receive  You always have the right to refuse treatment  The above information is an  only  It is not intended as medical advice for individual conditions or treatments  Talk to your doctor, nurse or pharmacist before following any medical regimen to see if it is safe and effective for you    © Copyright [employer] 2021 Information is for End User's use only and may not be sold, redistributed or otherwise used for commercial purposes   All illustrations and images included in CareNotes® are the copyrighted property of A D A M , Inc  or [address]

## 2022-02-07 ENCOUNTER — OFFICE VISIT (OUTPATIENT)
Dept: OBGYN CLINIC | Facility: CLINIC | Age: 32
End: 2022-02-07
Payer: COMMERCIAL

## 2022-02-07 VITALS
DIASTOLIC BLOOD PRESSURE: 68 MMHG | BODY MASS INDEX: 27.17 KG/M2 | HEIGHT: 60 IN | WEIGHT: 138.4 LBS | SYSTOLIC BLOOD PRESSURE: 102 MMHG

## 2022-02-07 VITALS
DIASTOLIC BLOOD PRESSURE: 88 MMHG | HEART RATE: 120 BPM | WEIGHT: 137.2 LBS | BODY MASS INDEX: 26.93 KG/M2 | HEIGHT: 60 IN | SYSTOLIC BLOOD PRESSURE: 127 MMHG

## 2022-02-07 DIAGNOSIS — R10.2 PELVIC PAIN: Primary | ICD-10-CM

## 2022-02-07 DIAGNOSIS — B96.89 BV (BACTERIAL VAGINOSIS): ICD-10-CM

## 2022-02-07 DIAGNOSIS — N76.0 BV (BACTERIAL VAGINOSIS): ICD-10-CM

## 2022-02-07 LAB
BV WHIFF TEST VAG QL: POSITIVE
CLUE CELLS SPEC QL WET PREP: PRESENT
DSDNA AB SER-ACNC: 1 IU/ML (ref 0–9)
EBV NA IGG SER IA-ACNC: <18 U/ML (ref 0–17.9)
EBV VCA IGG SER IA-ACNC: 377 U/ML (ref 0–17.9)
EBV VCA IGM SER IA-ACNC: <36 U/ML (ref 0–35.9)
INTERPRETATION: ABNORMAL
T VAGINALIS VAG QL WET PREP: ABNORMAL
YEAST VAG QL WET PREP: ABNORMAL

## 2022-02-07 PROCEDURE — 99214 OFFICE O/P EST MOD 30 MIN: CPT | Performed by: OBSTETRICS & GYNECOLOGY

## 2022-02-07 PROCEDURE — 87210 SMEAR WET MOUNT SALINE/INK: CPT | Performed by: OBSTETRICS & GYNECOLOGY

## 2022-02-07 RX ORDER — METRONIDAZOLE 500 MG/1
500 TABLET ORAL EVERY 12 HOURS SCHEDULED
Qty: 14 TABLET | Refills: 0 | Status: SHIPPED | OUTPATIENT
Start: 2022-02-07 | End: 2022-02-14

## 2022-02-07 RX ORDER — METRONIDAZOLE 7.5 MG/G
1 GEL VAGINAL 2 TIMES WEEKLY
Qty: 70 G | Refills: 1 | Status: ON HOLD | OUTPATIENT
Start: 2022-02-07 | End: 2022-06-01

## 2022-02-07 NOTE — PROGRESS NOTES
On-set: "Months"   RLQ Pain since September  Symptoms: RLQ Pain, Odor, Clear sometimes white discharge, No itching, Reports Urgency but states she recently had a UA and Urgency is normal for her     Pain Intermittent: 6/10  Amenorrheic on IUD    Reports having BV often and she has been told in the past she would benefit from Pelvic Floor PT

## 2022-02-07 NOTE — PROGRESS NOTES
Assessment/Plan:    Pelvic pain  -     US pelvis complete w transvaginal; Future  -     POCT wet mount    BV (bacterial vaginosis)  -     POCT wet mount  -     metroNIDAZOLE (FLAGYL) 500 mg tablet; Take 1 tablet (500 mg total) by mouth every 12 (twelve) hours for 7 days  -     metroNIDAZOLE (METROGEL) 0 75 % vaginal gel; Insert 1 application into the vagina 2 (two) times a week      Discussed protocol for recurrent BV - she is very interested in this  Also discussed vulvar hygiene recommendations  Currently using scented soap, advised to switch to sensitive skin/odor free  Exam c/w possible ovarian cyst - will check pelvic ultrasound  Discussed role of likely observation, and indications for surgical intervention  To call if pain worsens severely  Subjective:      Patient ID: Asa Mckeon is a 32 y o  female  Maria Isabel presents today with two concerns  1)  Right sided pelvic pain - this has been occurring since September  Does have a remote history of an ovarian cyst on this side  Pain is random and stabbing in nature  Did occur today  2)  Has discharge and odor  Has reported symptoms of BV - this has been a recurrent issue for her and she has been ignoring this, but would like to discussed treatment for recurrent symptoms  Has [place] IUD in place, has been amenorrheic with this  The following portions of the patient's history were reviewed and updated as appropriate: allergies, current medications and problem list     Review of Systems   Genitourinary: Positive for pelvic pain and vaginal discharge (and odor)  Negative for difficulty urinating, menstrual problem and vaginal bleeding  Objective:      /68 (BP Location: Left arm, Patient Position: Sitting, Cuff Size: Standard)   Ht 4' 11 75" (1 518 m)   Wt 62 8 kg (138 lb 6 4 oz)   BMI 27 26 kg/m²          Physical Exam  Vitals and nursing note reviewed  Constitutional:       Appearance: Normal appearance  Genitourinary:     General: Normal vulva  Labia:         Right: No rash or lesion  Left: No rash or lesion  Vagina: Vaginal discharge present  No erythema or bleeding  Cervix: Normal       Uterus: Normal  Not tender  Adnexa: Left adnexa normal         Right: Mass (3cm cyst appreciated) and tenderness present  Neurological:      Mental Status: She is alert  No

## 2022-02-14 ENCOUNTER — TELEPHONE (OUTPATIENT)
Dept: OBGYN CLINIC | Facility: HOSPITAL | Age: 32
End: 2022-02-14

## 2022-02-14 NOTE — TELEPHONE ENCOUNTER
Patient sees Dr Tom Harvey  Patient is calling in stating that she needs a return to work note she is asking if she is able to return to work this Wednesday 2/16  She is asking if this can be completed and placed into [employer] for her and a call once done          Call back# [phone]

## 2022-02-14 NOTE — TELEPHONE ENCOUNTER
Called [facility] to check on authorization status for  and spoke with Antonio Young who informed me that Jean Hobson request is marked as urgent and is currently pending review for approval/denial  I informed Antonio Young this Jean oHbson request has been pending since 1/25/2022 and patients appointment is on 2/25/22  Antonio Young transferred me to clinical nurse [place] voice mail  I left VM message for [place] to return my call about this ASAP due to the urgency of this matter

## 2022-02-16 ENCOUNTER — HOSPITAL ENCOUNTER (OUTPATIENT)
Dept: ULTRASOUND IMAGING | Facility: HOSPITAL | Age: 32
Discharge: HOME | End: 2022-02-16
Attending: OBSTETRICS & GYNECOLOGY
Payer: COMMERCIAL

## 2022-02-16 DIAGNOSIS — R10.2 PELVIC PAIN: ICD-10-CM

## 2022-02-16 PROCEDURE — 76856 US EXAM PELVIC COMPLETE: CPT

## 2022-02-16 PROCEDURE — 76830 TRANSVAGINAL US NON-OB: CPT

## 2022-02-21 NOTE — TELEPHONE ENCOUNTER
Received approved authorization via Fax      Approved  Botox 200 units  Auth# [id number]  Valid- 1/25/2022 until 1/25/2023  4 visits     Please use our Stock

## 2022-02-23 DIAGNOSIS — N83.201 RIGHT OVARIAN CYST: Primary | ICD-10-CM

## 2022-02-24 ENCOUNTER — TELEPHONE (OUTPATIENT)
Dept: NEUROLOGY | Facility: CLINIC | Age: 32
End: 2022-02-24

## 2022-02-24 NOTE — TELEPHONE ENCOUNTER
Pt called and states that she has botox appt tmrw  She would like to reschedule this d/t weather  Preferred Friday afternoon   She would prefer to speak w/ Tommy Mortensen about this as she normally can accommodate her     Botox appt cancelled tmrw per pt's request     [phone] ok to leave detailed message

## 2022-03-01 ENCOUNTER — TELEPHONE (OUTPATIENT)
Dept: OBGYN CLINIC | Facility: HOSPITAL | Age: 32
End: 2022-03-01

## 2022-03-01 NOTE — TELEPHONE ENCOUNTER
Patient sees Dr Antelmo Dacosta  Patient is calling in stating that she is in a lot of pain and is not really feeling well, she is asking if anything other than Sulfasalazine can be given to her  She is just sleeping all day and night long, nose soars, mouth soars  Patient is asking what further can be done she is not feeling better and only getting worse, please advise        Call back# [phone]

## 2022-03-02 ENCOUNTER — PROCEDURE VISIT (OUTPATIENT)
Dept: NEUROLOGY | Facility: CLINIC | Age: 32
End: 2022-03-02
Payer: COMMERCIAL

## 2022-03-02 VITALS — SYSTOLIC BLOOD PRESSURE: 118 MMHG | TEMPERATURE: 97.2 F | HEART RATE: 111 BPM | DIASTOLIC BLOOD PRESSURE: 68 MMHG

## 2022-03-02 DIAGNOSIS — M32.19 SYSTEMIC LUPUS ERYTHEMATOSUS WITH OTHER ORGAN INVOLVEMENT, UNSPECIFIED SLE TYPE (HCC): Primary | ICD-10-CM

## 2022-03-02 DIAGNOSIS — M32.19 SYSTEMIC LUPUS ERYTHEMATOSUS WITH OTHER ORGAN INVOLVEMENT, UNSPECIFIED SLE TYPE (HCC): ICD-10-CM

## 2022-03-02 DIAGNOSIS — R32 URINARY INCONTINENCE, UNSPECIFIED TYPE: ICD-10-CM

## 2022-03-02 DIAGNOSIS — E53.8 VITAMIN B12 DEFICIENCY: ICD-10-CM

## 2022-03-02 DIAGNOSIS — E55.9 VITAMIN D DEFICIENCY: ICD-10-CM

## 2022-03-02 DIAGNOSIS — G37.9 CNS DEMYELINATING DISEASE (HCC): ICD-10-CM

## 2022-03-02 DIAGNOSIS — G43.709 CHRONIC MIGRAINE WITHOUT AURA WITHOUT STATUS MIGRAINOSUS, NOT INTRACTABLE: Primary | ICD-10-CM

## 2022-03-02 DIAGNOSIS — R13.10 DYSPHAGIA, UNSPECIFIED TYPE: ICD-10-CM

## 2022-03-02 PROCEDURE — 64615 CHEMODENERV MUSC MIGRAINE: CPT | Performed by: PHYSICIAN ASSISTANT

## 2022-03-02 PROCEDURE — 99215 OFFICE O/P EST HI 40 MIN: CPT | Performed by: PHYSICIAN ASSISTANT

## 2022-03-02 RX ORDER — AZATHIOPRINE 50 MG/1
50 TABLET ORAL 2 TIMES DAILY
Qty: 60 TABLET | Refills: 3 | Status: SHIPPED | OUTPATIENT
Start: 2022-03-02 | End: 2022-06-24 | Stop reason: SDUPTHER

## 2022-03-02 RX ORDER — PREDNISONE 1 MG/1
TABLET ORAL
Qty: 70 TABLET | Refills: 0 | Status: ON HOLD | OUTPATIENT
Start: 2022-03-02 | End: 2022-06-01

## 2022-03-02 NOTE — ASSESSMENT & PLAN NOTE
Will evaluate for neurologic cause with MRI of the brain and C spine    IN addition will have patient obtain u/s kidney and bladder with PVR for urologic causes and a referral placed for urology

## 2022-03-02 NOTE — ASSESSMENT & PLAN NOTE
Will obtain MRI of the brain and C spine to evaluate for neurologic causes and will proceed with a swallowing study for further identification of the symptom

## 2022-03-02 NOTE — TELEPHONE ENCOUNTER
Please let patient know that she should stop the sulfasalazine  I'm starting her on azathioprine 50mg po bid instead; She should take 1 tablet a day for week, then start taking 1 tab twice a day  To help with her current flare, also sending a prednisone course starting at 20 mg daily to her pharmacy

## 2022-03-02 NOTE — ASSESSMENT & PLAN NOTE
With urinary incontinence and numbness, weakness and or tingling lasting longer than 24 hours at a time, will proceed with MRI of the brain and C spine to evaluate for demyelinating disease    Patient does have Lupus thus need to ensure there is not any active disease process in the brain or C spine causing her neurologic symptoms

## 2022-03-02 NOTE — PROGRESS NOTES
Patient ID: Griselda Shetty is a 28 y o  female  Assessment/Plan:    CNS demyelinating disease ([facility] [address] )  With urinary incontinence and numbness, weakness and or tingling lasting longer than 24 hours at a time, will proceed with MRI of the brain and C spine to evaluate for demyelinating disease  Patient does have Lupus thus need to ensure there is not any active disease process in the brain or C spine causing her neurologic symptoms    Urinary incontinence  Will evaluate for neurologic cause with MRI of the brain and C spine  IN addition will have patient obtain u/s kidney and bladder with PVR for urologic causes and a referral placed for urology    Dysphagia  Will obtain MRI of the brain and C spine to evaluate for neurologic causes and will proceed with a swallowing study for further identification of the symptom         Problem List Items Addressed This Visit        Digestive    Dysphagia     Will obtain MRI of the brain and C spine to evaluate for neurologic causes and will proceed with a swallowing study for further identification of the symptom         Relevant Orders    FL barium swallow video w speech       Cardiovascular and Mediastinum    Migraine - Primary    Relevant Medications    onabotulinumtoxin A (BOTOX) injection 200 Units (Completed)    Other Relevant Orders    TSH, 3rd generation with Free T4 reflex    Chemodenervation       Nervous and Auditory    CNS demyelinating disease ([facility] [address] )     With urinary incontinence and numbness, weakness and or tingling lasting longer than 24 hours at a time, will proceed with MRI of the brain and C spine to evaluate for demyelinating disease    Patient does have Lupus thus need to ensure there is not any active disease process in the brain or C spine causing her neurologic symptoms         Relevant Orders    MRI brain MS wo and w contrast    MRI cervical spine with and without contrast    US kidney and bladder with pvr    CBC and Platelet    Comprehensive metabolic panel TSH, 3rd generation with Free T4 reflex       Other    Systemic lupus erythematosus (HCC)    Urinary incontinence     Will evaluate for neurologic cause with MRI of the brain and C spine  IN addition will have patient obtain u/s kidney and bladder with PVR for urologic causes and a referral placed for urology         Relevant Orders    US kidney and bladder with pvr    TSH, 3rd generation with Free T4 reflex    Ambulatory Referral to Urology    Vitamin D deficiency    Relevant Orders    Vitamin D 25 hydroxy    Vitamin B12 deficiency    Relevant Orders    Vitamin B12             Subjective:    HPI  +numbness and tingling lasting longer than 24 hours at time  Happens in legs, feet, arms and/or face  Sometimes together but each can happen individually  No particular pattern to this  Last week had left lower facial numbness which lasted over 24 hours  Patient was actually 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  There was not any evidence of facial drooping or asymmetry per patient over the 24 hours period    Bladder incontinence over past few months which is worsening  Not when cough, laugh or sneeze (not stress incontinence), more random in nature  No neurologic sensation or feeling of this happening (ie not having feeling of urgency to void and waiting too long)  Feels weakness in her legs (difficulty climbing stairs most of the time), this is longer than 24 hours  Feels weakness in her upper extremities with writing, lifting and fine motor skills  Has muscle spasms in ribs, shoulders and calves without exercising or straining  Patient also chokes when swallowing both liquids and solids  Worse with liquids  Happens at least 4 times a week  States can't coordinate swallowing    Feels her lips are working at times as she just will have drooling when drinking and swallowing  Has fallen from going from a sitting to a standing position        Objective:    Blood pressure 118/68, pulse (!) 111, temperature (!) 97 2 °F (36 2 °C), temperature source Temporal, not currently breastfeeding  Physical Exam    Neurological Exam        CONSTITUTIONAL: Well developed, well nourished, well groomed  No dysmorphic features  Eyes:  PERRLA, EOM normal      Neck:  Normal ROM, neck supple  HEENT:  Normocephalic atraumatic  Oropharynx is moist    Chest:  Respirations regular and unlabored  Cardiovascular:  Distal extremities warm  no observed significant swelling  Musculoskeletal:  Full range of motion  (see below under neurologic exam for evaluation of motor function and gait)   Skin:  warm and dry   Psychiatric:  Normal behavior and appropriate affect        SKULL AND SPINE  ROM: Full range of motion  Tenderness: No focal tenderness  Paravertebral Muscles: normal    MENTAL STATUS  Orientation: Alert and oriented x 3  Fund of knowledge: Intact  CRANIAL NERVES  II: PERRL  Visual fields full  III, IV, VI: Extraocular movements intact  No nystagmus  V: Facial sensation normal V1-V3  VII: Facial movements normal and symmetric  VIII: Intact hearing bilaterally  IX, X: Palate elevation normal and symmetric  XI: Intact trapezius, SCM strength  XII: Tongue protrudes to the midline    MOTOR (Upper and lower extremities)   Bulk/tone/abnormal movement: Normal muscle bulk and tone  Drift: No pronator drift  Strength: Strength 5/5 throughout  COORDINATION   F/N: past point on right  FFM: normal  LOURDES: normal  Station/Gait: Normal baseline gait  Normal tandem gait  SENSORY  Temperature: normal  Vibration: normal  Light touch: normal  Romberg sign absent  Reflexes:    deep tendon reflexes are 4+/4 upper right and 2+/4 upper left  Lower right 3+/4 and lower left 4+/4      ROS:    Review of Systems   Constitutional: Negative for chills and fever  HENT: Negative for ear pain and sore throat  Eyes: Negative for pain and visual disturbance     Respiratory: Negative for cough and shortness of breath  Cardiovascular: Negative for chest pain and palpitations  Gastrointestinal: Negative for abdominal pain and vomiting  Genitourinary: Negative for dysuria and hematuria  Musculoskeletal: Negative for arthralgias and back pain  Skin: Negative for color change and rash  Neurological: Positive for numbness  Negative for seizures and syncope  All other systems reviewed and are negative  I personally reviewed and updated the ROS that was entered by the medical assistant        Universal Protocol   Consent: Verbal consent obtained  Written consent obtained  Risks and benefits: risks, benefits and alternatives were discussed  Consent given by: patient  Time out: Immediately prior to procedure a "time out" was called to verify the correct patient, procedure, equipment, support staff and site/side marked as required  Patient understanding: patient states understanding of the procedure being performed  Patient consent: the patient's understanding of the procedure matches consent given  Procedure consent: procedure consent matches procedure scheduled  Relevant documents: relevant documents present and verified  Patient identity confirmed: verbally with patient        Chemodenervation     Date/Time 3/2/2022 12:04 PM     Performed by  Annalee Vila PA-C     Authorized by Annalee Vila PA-C        Pre-procedure details      Prepped With: Alcohol     Anesthesia  (see MAR for exact dosages):      Anesthesia method:  None   Procedure details     Position:  Upright   Botox     Botox Type:  Type A    Brand:  Botox    mL's of Botulinum Toxin:  200    Final Concentration per CC:  50 units    Needle Gauge:  30 G 2 5 inch   Procedures     Botox Procedures: chronic headache      Indications: migraines     Injection Location      Head / Face:  L superior cervical paraspinal, R superior cervical paraspinal, L , R , L frontalis, R frontalis, L medial occipitalis, R medial occipitalis, procerus, R temporalis, L temporalis, R superior trapezius and L superior trapezius    L  injection amount:  5 unit(s)    R  injection amount:  5 unit(s)    L lateral frontalis:  5 unit(s)    R lateral frontalis:  5 unit(s)    L medial frontalis:  5 unit(s)    R medial frontalis:  5 unit(s)    L temporalis injection amount:  20 unit(s)    R temporalis injection amount:  20 unit(s)    Procerus injection amount:  5 unit(s)    L medial occipitalis injection amount:  15 unit(s)    R medial occipitalis injection amount:  15 unit(s)    L superior cervical paraspinal injection amount:  10 unit(s)    R superior cervical paraspinal injection amount:  10 unit(s)    L superior trapezius injection amount:  15 unit(s)    R superior trapezius injection amount:  15 unit(s)   Total Units     Total units used:  200    Total units discarded:  0   Post-procedure details      Chemodenervation:  Chronic migraine    Facial Nerve Location[de-identified]  Bilateral facial nerve    Patient tolerance of procedure:   Tolerated well, no immediate complications   Comments      5 units orbicularis oculi bilaterally  20 units frontalis  15 units temporalis  All medically necessary         Greater than 50% of the 40 minutes evaluation was a face-to-face discussion regarding  the pathophysiology of her current symptoms and further plan, as well as counseling, educating, and coordinating the patient's care including pathogenesis of diagnosis, prognosis of diagnosis, diagnostic results, impression, and recommendations, risks and benefits of treatment, instructions for disease self management, treatment instructions, follow up requirements and risk factors and risk reduction of disease and 20 minutes of non-face to face time

## 2022-03-02 NOTE — TELEPHONE ENCOUNTER
Patient was called and told to stop her Sulfasalazine and start with her new medication, and told about her prednisone script  She said to tell you thank you very much, she appreciates it

## 2022-04-08 ENCOUNTER — APPOINTMENT (EMERGENCY)
Dept: CT IMAGING | Facility: HOSPITAL | Age: 32
End: 2022-04-08
Payer: COMMERCIAL

## 2022-04-08 ENCOUNTER — HOSPITAL ENCOUNTER (OUTPATIENT)
Dept: MRI IMAGING | Facility: HOSPITAL | Age: 32
Discharge: HOME | End: 2022-04-08
Payer: COMMERCIAL

## 2022-04-08 ENCOUNTER — HOSPITAL ENCOUNTER (EMERGENCY)
Facility: HOSPITAL | Age: 32
Discharge: HOME | End: 2022-04-08
Attending: EMERGENCY MEDICINE
Payer: COMMERCIAL

## 2022-04-08 ENCOUNTER — APPOINTMENT (EMERGENCY)
Dept: ULTRASOUND IMAGING | Facility: HOSPITAL | Age: 32
End: 2022-04-08
Payer: COMMERCIAL

## 2022-04-08 VITALS
OXYGEN SATURATION: 99 % | SYSTOLIC BLOOD PRESSURE: 114 MMHG | TEMPERATURE: 98.4 F | BODY MASS INDEX: 25.6 KG/M2 | HEART RATE: 84 BPM | RESPIRATION RATE: 18 BRPM | DIASTOLIC BLOOD PRESSURE: 78 MMHG | WEIGHT: 130 LBS

## 2022-04-08 DIAGNOSIS — N39.0 URINARY TRACT INFECTION: ICD-10-CM

## 2022-04-08 DIAGNOSIS — G37.9 CNS DEMYELINATING DISEASE (HCC): ICD-10-CM

## 2022-04-08 DIAGNOSIS — R10.31 RLQ ABDOMINAL PAIN: Primary | ICD-10-CM

## 2022-04-08 LAB
ALBUMIN SERPL BCP-MCNC: 4 G/DL (ref 3.5–5)
ALP SERPL-CCNC: 44 U/L (ref 46–116)
ALT SERPL W P-5'-P-CCNC: 15 U/L (ref 12–78)
ANION GAP SERPL CALCULATED.3IONS-SCNC: 11 MMOL/L (ref 4–13)
AST SERPL W P-5'-P-CCNC: 13 U/L (ref 5–45)
BACTERIA UR QL AUTO: ABNORMAL /HPF
BASOPHILS # BLD AUTO: 0.04 THOUSANDS/ΜL (ref 0–0.1)
BASOPHILS NFR BLD AUTO: 1 % (ref 0–1)
BILIRUB SERPL-MCNC: 0.28 MG/DL (ref 0.2–1)
BILIRUB UR QL STRIP: NEGATIVE
BUN SERPL-MCNC: 18 MG/DL (ref 5–25)
CALCIUM SERPL-MCNC: 8.7 MG/DL (ref 8.3–10.1)
CHLORIDE SERPL-SCNC: 103 MMOL/L (ref 100–108)
CLARITY UR: CLEAR
CO2 SERPL-SCNC: 24 MMOL/L (ref 21–32)
COLOR UR: YELLOW
CREAT SERPL-MCNC: 1.08 MG/DL (ref 0.6–1.3)
EOSINOPHIL # BLD AUTO: 0.13 THOUSAND/ΜL (ref 0–0.61)
EOSINOPHIL NFR BLD AUTO: 3 % (ref 0–6)
ERYTHROCYTE [DISTWIDTH] IN BLOOD BY AUTOMATED COUNT: 10.9 % (ref 11.6–15.1)
EXT PREG TEST URINE: NEGATIVE
EXT. CONTROL ED NAV: NORMAL
GFR SERPL CREATININE-BSD FRML MDRD: 68 ML/MIN/1.73SQ M
GLUCOSE SERPL-MCNC: 83 MG/DL (ref 65–140)
GLUCOSE UR STRIP-MCNC: NEGATIVE MG/DL
HCT VFR BLD AUTO: 40.4 % (ref 34.8–46.1)
HGB BLD-MCNC: 13.8 G/DL (ref 11.5–15.4)
HGB UR QL STRIP.AUTO: ABNORMAL
IMM GRANULOCYTES # BLD AUTO: 0.01 THOUSAND/UL (ref 0–0.2)
IMM GRANULOCYTES NFR BLD AUTO: 0 % (ref 0–2)
KETONES UR STRIP-MCNC: ABNORMAL MG/DL
LEUKOCYTE ESTERASE UR QL STRIP: ABNORMAL
LIPASE SERPL-CCNC: 108 U/L (ref 73–393)
LYMPHOCYTES # BLD AUTO: 1.87 THOUSANDS/ΜL (ref 0.6–4.47)
LYMPHOCYTES NFR BLD AUTO: 38 % (ref 14–44)
MCH RBC QN AUTO: 32 PG (ref 26.8–34.3)
MCHC RBC AUTO-ENTMCNC: 34.2 G/DL (ref 31.4–37.4)
MCV RBC AUTO: 94 FL (ref 82–98)
MONOCYTES # BLD AUTO: 0.31 THOUSAND/ΜL (ref 0.17–1.22)
MONOCYTES NFR BLD AUTO: 6 % (ref 4–12)
MUCOUS THREADS UR QL AUTO: ABNORMAL
NEUTROPHILS # BLD AUTO: 2.52 THOUSANDS/ΜL (ref 1.85–7.62)
NEUTS SEG NFR BLD AUTO: 52 % (ref 43–75)
NITRITE UR QL STRIP: NEGATIVE
NON-SQ EPI CELLS URNS QL MICRO: ABNORMAL /HPF
NRBC BLD AUTO-RTO: 0 /100 WBCS
PH UR STRIP.AUTO: 6 [PH]
PLATELET # BLD AUTO: 304 THOUSANDS/UL (ref 149–390)
PMV BLD AUTO: 11.6 FL (ref 8.9–12.7)
POTASSIUM SERPL-SCNC: 3.5 MMOL/L (ref 3.5–5.3)
PROT SERPL-MCNC: 7.3 G/DL (ref 6.4–8.2)
PROT UR STRIP-MCNC: NEGATIVE MG/DL
RBC # BLD AUTO: 4.31 MILLION/UL (ref 3.81–5.12)
RBC #/AREA URNS AUTO: ABNORMAL /HPF
SODIUM SERPL-SCNC: 138 MMOL/L (ref 136–145)
SP GR UR STRIP.AUTO: >=1.03 (ref 1–1.03)
UROBILINOGEN UR QL STRIP.AUTO: 0.2 E.U./DL
WBC # BLD AUTO: 4.88 THOUSAND/UL (ref 4.31–10.16)
WBC #/AREA URNS AUTO: ABNORMAL /HPF

## 2022-04-08 PROCEDURE — 36415 COLL VENOUS BLD VENIPUNCTURE: CPT

## 2022-04-08 PROCEDURE — 74177 CT ABD & PELVIS W/CONTRAST: CPT

## 2022-04-08 PROCEDURE — 85025 COMPLETE CBC W/AUTO DIFF WBC: CPT | Performed by: EMERGENCY MEDICINE

## 2022-04-08 PROCEDURE — G1004 CDSM NDSC: HCPCS

## 2022-04-08 PROCEDURE — 81001 URINALYSIS AUTO W/SCOPE: CPT | Performed by: PHYSICIAN ASSISTANT

## 2022-04-08 PROCEDURE — 96375 TX/PRO/DX INJ NEW DRUG ADDON: CPT

## 2022-04-08 PROCEDURE — 83690 ASSAY OF LIPASE: CPT | Performed by: EMERGENCY MEDICINE

## 2022-04-08 PROCEDURE — 76856 US EXAM PELVIC COMPLETE: CPT

## 2022-04-08 PROCEDURE — 70553 MRI BRAIN STEM W/O & W/DYE: CPT

## 2022-04-08 PROCEDURE — 99285 EMERGENCY DEPT VISIT HI MDM: CPT | Performed by: PHYSICIAN ASSISTANT

## 2022-04-08 PROCEDURE — 96374 THER/PROPH/DIAG INJ IV PUSH: CPT

## 2022-04-08 PROCEDURE — 76830 TRANSVAGINAL US NON-OB: CPT

## 2022-04-08 PROCEDURE — 72156 MRI NECK SPINE W/O & W/DYE: CPT

## 2022-04-08 PROCEDURE — 81025 URINE PREGNANCY TEST: CPT | Performed by: PHYSICIAN ASSISTANT

## 2022-04-08 PROCEDURE — 80053 COMPREHEN METABOLIC PANEL: CPT | Performed by: EMERGENCY MEDICINE

## 2022-04-08 PROCEDURE — A9585 GADOBUTROL INJECTION: HCPCS | Performed by: PHYSICIAN ASSISTANT

## 2022-04-08 PROCEDURE — 99284 EMERGENCY DEPT VISIT MOD MDM: CPT

## 2022-04-08 RX ORDER — MELOXICAM 15 MG/1
15 TABLET ORAL DAILY
Qty: 10 TABLET | Refills: 0 | Status: ON HOLD | OUTPATIENT
Start: 2022-04-08 | End: 2022-06-01

## 2022-04-08 RX ORDER — NITROFURANTOIN 25; 75 MG/1; MG/1
100 CAPSULE ORAL 2 TIMES DAILY
Qty: 10 CAPSULE | Refills: 0 | Status: ON HOLD | OUTPATIENT
Start: 2022-04-08 | End: 2022-06-01

## 2022-04-08 RX ORDER — METOCLOPRAMIDE HYDROCHLORIDE 5 MG/ML
10 INJECTION INTRAMUSCULAR; INTRAVENOUS ONCE
Status: COMPLETED | OUTPATIENT
Start: 2022-04-08 | End: 2022-04-08

## 2022-04-08 RX ORDER — MORPHINE SULFATE 4 MG/ML
4 INJECTION, SOLUTION INTRAMUSCULAR; INTRAVENOUS ONCE
Status: COMPLETED | OUTPATIENT
Start: 2022-04-08 | End: 2022-04-08

## 2022-04-08 RX ORDER — ONDANSETRON 4 MG/1
4 TABLET, ORALLY DISINTEGRATING ORAL ONCE
Status: COMPLETED | OUTPATIENT
Start: 2022-04-08 | End: 2022-04-08

## 2022-04-08 RX ADMIN — GADOBUTROL 5 ML: 604.72 INJECTION INTRAVENOUS at 20:10

## 2022-04-08 RX ADMIN — IOHEXOL 100 ML: 350 INJECTION, SOLUTION INTRAVENOUS at 17:56

## 2022-04-08 RX ADMIN — ONDANSETRON 4 MG: 4 TABLET, ORALLY DISINTEGRATING ORAL at 15:37

## 2022-04-08 RX ADMIN — MORPHINE SULFATE 4 MG: 4 INJECTION INTRAVENOUS at 17:31

## 2022-04-08 RX ADMIN — METOCLOPRAMIDE HYDROCHLORIDE 10 MG: 5 INJECTION INTRAMUSCULAR; INTRAVENOUS at 17:31

## 2022-04-08 NOTE — DISCHARGE INSTRUCTIONS
Please return to the emergency department for worsening symptoms including chest pain, shortness of breath, dizziness, lightheadedness, fever greater than 103, severe pain, inability to walk, fainting episodes, etc  Please follow-up with your family practice provider as soon as possible  I have sent an antibiotic over to your pharmacy  Please take this as prescribed  I have sent a pain medication over to your pharmacy  Please take this as prescribed  You may take this in addition to Tylenol

## 2022-04-08 NOTE — ED NOTES
Pt d/c'd  Pt has outpt MRI scheduled this evening at 1845pm  Per MRI current IV line placed in ED will remain in place despite pt d/c from ED at request of MRI   MRI to remove IV line when MRI completed     Elma Sheikh RN  04/08/22 [id number]

## 2022-04-08 NOTE — ED NOTES
Patient transported to [facility], [address] [place] [address]  04/08/22 [id number]

## 2022-04-09 NOTE — ED PROVIDER NOTES
History  Chief Complaint   Patient presents with    Abdominal Cramping     pt c/o RLQ pain  pain started a few days ago and had vaginal bleeding yesterday  pt has a hx of ovarian cysts  This is a 44-year-old female with past medical history significant for lupus in a right-sided ovarian cyst presenting to the emergency department today for right lower quadrant pain that radiates down her right leg starting yesterday  She notes this feels similar to her ovarian cyst pain  She denies any nausea or vomiting  She notes the pain is sharp  She does note some vaginal spotting but denies any active vaginal bleeding at this time  She also notes some dysuria without any hematuria  She denies any fever or chills  She denies any chest pain or shortness of breath  No prior abdominal surgical history  Has been using ibuprofen with some relief  The patient denies other complaints at this time  History provided by:  Patient   used: No    Abdominal Cramping  Pain location:  RLQ  Pain quality: sharp    Pain radiation: right leg  Pain severity:  Moderate  Onset quality:  Gradual  Duration:  1 day  Timing:  Constant  Progression:  Worsening  Chronicity:  New  Context: not previous surgeries, not sick contacts and not trauma    Relieved by:  NSAIDs  Worsened by: Movement  Associated symptoms: dysuria and vaginal bleeding    Associated symptoms: no anorexia, no belching, no chest pain, no chills, no constipation, no cough, no diarrhea, no fatigue, no fever, no hematuria, no melena, no nausea, no shortness of breath, no vaginal discharge and no vomiting    Risk factors: NSAID use    Risk factors: has not had multiple surgeries and not pregnant        Prior to Admission Medications   Prescriptions Last Dose Informant Patient Reported? Taking?    FLUoxetine (PROzac) 10 mg capsule  Self Yes No   Sig: Take 10 mg by mouth daily    Ibuprofen 200 MG CAPS  Self Yes No   Sig: Take 800 mg by mouth every 6 (six) hours as needed   OLANZapine-FLUoxetine (SYMBYAX) 3-25 MG per capsule  Self Yes No   Sig: Take 1 capsule by mouth every evening   Plecanatide (Trulance) 3 MG TABS   No No   Sig: Take 1 tablet by mouth daily   Patient not taking: Reported on 10/1/2021   Rimegepant Sulfate (Nurtec) 75 MG TBDP   No No   Sig: Take 75 mg by mouth as needed (migraine)   Rimegepant Sulfate (Nurtec) 75 MG TBDP   No No   Sig: Take 75 mg by mouth every other day   Patient not taking: Reported on 2021   SUMAtriptan (IMITREX) 100 mg tablet   No No   Sig: Take 1 tablet (100 mg total) by mouth as needed for migraine (migraine)   Ubrogepant (UBRELVY) 100 MG tablet   No No   Sig: Take 1 tablet (100 mg) one time as needed for migraine  May repeat one additional tablet (100 mg) at least two hours after the first dose  Do not use more than two doses per day, or for more than 10 days per month     amphetamine-dextroamphetamine (ADDERALL) 20 mg tablet  Self Yes No   Sig: Take 20 mg by mouth daily    azaTHIOprine (IMURAN) 50 mg tablet   No No   Sig: Take 1 tablet (50 mg total) by mouth 2 (two) times a day   azelastine (ASTELIN) 0 1 % nasal spray   No No   Si sprays into each nostril 2 (two) times a day Use in each nostril as directed   buPROPion (WELLBUTRIN SR) 150 mg 12 hr tablet  Self Yes No   Sig: Take 150 mg by mouth 2 (two) times a day    cyclobenzaprine (FLEXERIL) 5 mg tablet  Self No No   Sig: Take 2 tablets (10 mg total) by mouth daily at bedtime   Patient taking differently: Take 10 mg by mouth as needed    dexamethasone (DECADRON) 1 mg tablet   No No   Sig: Take 1 tablet (1 mg total) by mouth daily with breakfast   Patient not taking: Reported on 10/1/2021   dexamethasone (DECADRON) 1 mg tablet   No No   Sig: Take 1 tablet (1 mg total) by mouth as needed (migraine)   diazepam (VALIUM) 5 mg tablet  Self Yes No   Sig: Take 5 mg by mouth every 6 (six) hours as needed    famotidine (PEPCID) 20 mg tablet   No No   Sig: Take 1 tablet (20 mg total) by mouth 2 (two) times a day   fexofenadine (ALLEGRA) 180 MG tablet  Self Yes No   Sig: Take 180 mg by mouth daily   Patient not taking: Reported on 10/1/2021   hydroxychloroquine (PLAQUENIL) 200 mg tablet   No No   Sig: Take 1 5 tablets (300 mg total) by mouth daily   ketorolac (TORADOL) 10 mg tablet   No No   Sig: Take 1 tablet (10 mg total) by mouth every 6 (six) hours as needed for moderate pain   lamoTRIgine (LaMICtal) 150 MG tablet   Yes No   Sig: Take 150 mg by mouth daily    levonorgestrel (KYLEENA) 19 5 MG intrauterine device  Self Yes No   Si Intra Uterine Device by Intrauterine route once   levothyroxine 100 mcg tablet   No No   Sig: TAKE ONE TABLET BY MOUTH ONCE DAILY   meloxicam (Mobic) 15 mg tablet   No No   Sig: Take 1 tablet (15 mg total) by mouth daily   metroNIDAZOLE (METROGEL) 0 75 % vaginal gel   No No   Sig: Insert 1 application into the vagina 2 (two) times a week   mupirocin (BACTROBAN) 2 % ointment   No No   Sig: Apply topically 3 (three) times a day   Patient taking differently: Apply 1 application topically 3 (three) times a day as needed    nitrofurantoin (MACROBID) 100 mg capsule  Self No No   Sig: Take 1 capsule (100 mg total) by mouth 2 (two) times a day   Patient not taking: Reported on 10/1/2021   nitrofurantoin (MACRODANTIN) 50 mg capsule  Self No No   Sig: Take 1 capsule (50 mg total) by mouth daily at bedtime as needed (after intercourse)   Patient not taking: Reported on 2021   polyethylene glycol (MIRALAX) 17 g packet  Self Yes No   Sig: Take 17 g by mouth daily   Patient not taking: Reported on 10/1/2021   predniSONE 2 5 mg tablet   No No   Sig: Take 1 tablet (2 5 mg total) by mouth daily   predniSONE 5 mg tablet   No No   Si tabs x7 days, then 3 tabs x7 days, then 2 tabs x7 days, then 1 tab x7 days, then stop    prochlorperazine (COMPAZINE) 10 mg tablet   No No   Sig: Take 1 tablet (10 mg total) by mouth every 6 (six) hours as needed (migraine) tiZANidine (ZANAFLEX) 2 mg tablet   No No   Sig: Take 1 tablet (2 mg total) by mouth every 8 (eight) hours as needed for muscle spasms   topiramate (TOPAMAX) 50 MG tablet   No No   Si tab in the am and 2 tabs at bedtime      Facility-Administered Medications: None       Past Medical History:   Diagnosis Date    Anxiety     Depression     Encounter for IUD removal and reinsertion 2019    Lupus ([address] )     Migraines     Ovarian cyst, right     PONV (postoperative nausea and vomiting)     Stress fracture, right foot, initial encounter for fracture        Past Surgical History:   Procedure Laterality Date    COLONOSCOPY      INGUINAL HERNIA REPAIR  10/1998    REDUCTION MAMMAPLASTY  2013    TONSILLECTOMY  2011    TONSILLECTOMY      UPPER GASTROINTESTINAL ENDOSCOPY      WISDOM TOOTH EXTRACTION         Family History   Problem Relation Age of Onset    Colon cancer Mother     Arthritis Mother     Thyroid disease Mother     Depression Mother     Anxiety disorder Mother     Clotting disorder Mother     Hyperlipidemia Mother     Vesicoureteral reflux Mother     Irritable bowel syndrome Mother    Normie Glory Migraines Mother     Skin cancer Mother     Thyroid disease unspecified Mother     Heart disease Father     Hypertension Father     Arthritis Father     Hyperlipidemia Father     Vesicoureteral reflux Father     Hypertension Brother     Hyperlipidemia Brother     COPD Maternal Grandmother     Stroke Maternal Grandmother     Lung cancer Maternal Grandmother     Arthritis Maternal Grandmother     Asthma Maternal Grandmother     Hyperlipidemia Maternal Grandmother     Vesicoureteral reflux Maternal Grandmother     Heart disease Maternal Grandmother     Hypertension Maternal Grandmother     Migraines Maternal Grandmother     Osteoporosis Maternal Grandmother     Heart attack Maternal Grandfather     Heart disease Maternal Grandfather     Diabetes Maternal Grandfather     Heart disease Paternal Grandmother     Hyperlipidemia Paternal Grandmother     Diabetes Paternal Grandfather     Kidney disease Maternal Aunt     Migraines Maternal Aunt     Heart attack Maternal Uncle     Heart disease Maternal Uncle     Melanoma Maternal Uncle     Lupus Cousin      I have reviewed and agree with the history as documented  E-Cigarette/Vaping    E-Cigarette Use Never User      E-Cigarette/Vaping Substances    Nicotine No     THC No     CBD No     Flavoring No     Other No     Unknown No      Social History     Tobacco Use    Smoking status: Never Smoker    Smokeless tobacco: Never Used   Vaping Use    Vaping Use: Never used   Substance Use Topics    Alcohol use: Yes     Comment: social    Drug use: Never       Review of Systems   Constitutional: Negative for appetite change, chills, diaphoresis, fatigue and fever  Eyes: Negative for visual disturbance  Respiratory: Negative for cough, chest tightness, shortness of breath and wheezing  Cardiovascular: Negative for chest pain and palpitations  Gastrointestinal: Negative for abdominal pain, anorexia, constipation, diarrhea, melena, nausea and vomiting  Genitourinary: Positive for dysuria, pelvic pain and vaginal bleeding  Negative for difficulty urinating, hematuria, vaginal discharge and vaginal pain  Musculoskeletal: Negative for neck pain and neck stiffness  Skin: Negative for rash and wound  Neurological: Negative for dizziness, seizures, syncope, weakness, light-headedness, numbness and headaches  Psychiatric/Behavioral: Negative for confusion  All other systems reviewed and are negative  Physical Exam  Physical Exam  Vitals and nursing note reviewed  Constitutional:       General: She is not in acute distress  Appearance: Normal appearance  She is normal weight  She is not ill-appearing, toxic-appearing or diaphoretic  HENT:      Head: Normocephalic and atraumatic        Nose: Nose normal  No congestion or rhinorrhea  Mouth/Throat:      Mouth: Mucous membranes are moist       Pharynx: No oropharyngeal exudate or posterior oropharyngeal erythema  Eyes:      General: No scleral icterus  Right eye: No discharge  Left eye: No discharge  Extraocular Movements: Extraocular movements intact  Conjunctiva/sclera: Conjunctivae normal    Cardiovascular:      Rate and Rhythm: Normal rate and regular rhythm  Pulses: Normal pulses  Heart sounds: Normal heart sounds  No murmur heard  No friction rub  No gallop  Pulmonary:      Effort: Pulmonary effort is normal  No respiratory distress  Breath sounds: Normal breath sounds  No stridor  No wheezing, rhonchi or rales  Chest:      Chest wall: No tenderness  Abdominal:      General: Abdomen is flat  There is no distension  Palpations: Abdomen is soft  Tenderness: There is abdominal tenderness  There is no right CVA tenderness, left CVA tenderness, guarding or rebound  Comments: Right lower quadrant tenderness to palpation  Positive McBurney's point tenderness  Negative Stahl sign  Non peritoneal   Musculoskeletal:         General: Normal range of motion  Cervical back: Normal range of motion  No tenderness  Right lower leg: No edema  Left lower leg: No edema  Comments: Negative Lalit sign bilaterally  Negative straight leg raise   Skin:     General: Skin is warm and dry  Capillary Refill: Capillary refill takes less than 2 seconds  Coloration: Skin is not jaundiced or pale  Neurological:      General: No focal deficit present  Mental Status: She is alert and oriented to person, place, and time  Mental status is at baseline     Psychiatric:         Mood and Affect: Mood normal          Behavior: Behavior normal          Vital Signs  ED Triage Vitals   Temperature Pulse Respirations Blood Pressure SpO2   04/08/22 1531 04/08/22 1531 04/08/22 1531 04/08/22 1531 04/08/22 1531   98 4 °F (36 9 °C) 98 18 128/74 99 %      Temp src Heart Rate Source Patient Position - Orthostatic VS BP Location FiO2 (%)   -- 04/08/22 1531 04/08/22 1531 04/08/22 1736 --    Monitor Sitting Right arm       Pain Score       04/08/22 1534       6           Vitals:    04/08/22 1531 04/08/22 1736   BP: 128/74 114/78   Pulse: 98 84   Patient Position - Orthostatic VS: Sitting Sitting         Visual Acuity      ED Medications  Medications   ondansetron (ZOFRAN-ODT) dispersible tablet 4 mg (4 mg Oral Given 4/8/22 1537)   morphine (PF) 4 mg/mL injection 4 mg (4 mg Intravenous Given 4/8/22 1731)   metoclopramide (REGLAN) injection 10 mg (10 mg Intravenous Given 4/8/22 1731)   iohexol (OMNIPAQUE) 350 MG/ML injection (SINGLE-DOSE) 100 mL (100 mL Intravenous Given 4/8/22 1756)       Diagnostic Studies  Results Reviewed     Procedure Component Value Units Date/Time    Urine Microscopic [[id number]]  (Abnormal) Collected: 04/08/22 1641    Lab Status: Final result Specimen: Urine, Clean Catch Updated: 04/08/22 1732     RBC, UA 2-4 /hpf      WBC, UA 1-2 /hpf      Epithelial Cells Occasional /hpf      Bacteria, UA Innumerable /hpf      MUCUS THREADS Moderate    Narrative:      Concentrated microscopic performed on low volume urine    UA w Reflex to Microscopic w Reflex to Culture [[id number]]  (Abnormal) Collected: 04/08/22 1641    Lab Status: Final result Specimen: Urine, Clean Catch Updated: 04/08/22 1708     Color, UA Yellow     Clarity, UA Clear     Specific Gravity, UA >=1 030     pH, UA 6 0     Leukocytes, UA Trace     Nitrite, UA Negative     Protein, UA Negative mg/dl      Glucose, UA Negative mg/dl      Ketones, UA Trace mg/dl      Urobilinogen, UA 0 2 E U /dl      Bilirubin, UA Negative     Blood, UA Trace-lysed    POCT pregnancy, urine [[id number]]  (Normal) Resulted: 04/08/22 1644    Lab Status: Final result Updated: 04/08/22 1645     EXT PREG TEST UR (Ref: Negative) negative     Control valid    Comprehensive metabolic panel [[id number]]  (Abnormal) Collected: 04/08/22 1536    Lab Status: Final result Specimen: Blood from Arm, Right Updated: 04/08/22 1617     Sodium 138 mmol/L      Potassium 3 5 mmol/L      Chloride 103 mmol/L      CO2 24 mmol/L      ANION GAP 11 mmol/L      BUN 18 mg/dL      Creatinine 1 08 mg/dL      Glucose 83 mg/dL      Calcium 8 7 mg/dL      AST 13 U/L      ALT 15 U/L      Alkaline Phosphatase 44 U/L      Total Protein 7 3 g/dL      Albumin 4 0 g/dL      Total Bilirubin 0 28 mg/dL      eGFR 68 ml/min/1 73sq m     Narrative:      National Kidney Disease Foundation guidelines for Chronic Kidney Disease (CKD):     Stage 1 with normal or high GFR (GFR > 90 mL/min/1 73 square meters)    Stage 2 Mild CKD (GFR = 60-89 mL/min/1 73 square meters)    Stage 3A Moderate CKD (GFR = 45-59 mL/min/1 73 square meters)    Stage 3B Moderate CKD (GFR = 30-44 mL/min/1 73 square meters)    Stage 4 Severe CKD (GFR = 15-29 mL/min/1 73 square meters)    Stage 5 End Stage CKD (GFR <15 mL/min/1 73 square meters)  Note: GFR calculation is accurate only with a steady state creatinine    Lipase [[id number]]  (Normal) Collected: 04/08/22 1536    Lab Status: Final result Specimen: Blood from Arm, Right Updated: 04/08/22 1617     Lipase 108 u/L     CBC and differential [[id number]]  (Abnormal) Collected: 04/08/22 1536    Lab Status: Final result Specimen: Blood from Arm, Right Updated: 04/08/22 1551     WBC 4 88 Thousand/uL      RBC 4 31 Million/uL      Hemoglobin 13 8 g/dL      Hematocrit 40 4 %      MCV 94 fL      MCH 32 0 pg      MCHC 34 2 g/dL      RDW 10 9 %      MPV 11 6 fL      Platelets 225 Thousands/uL      nRBC 0 /100 WBCs      Neutrophils Relative 52 %      Immat GRANS % 0 %      Lymphocytes Relative 38 %      Monocytes Relative 6 %      Eosinophils Relative 3 %      Basophils Relative 1 %      Neutrophils Absolute 2 52 Thousands/µL      Immature Grans Absolute 0 01 Thousand/uL      Lymphocytes Absolute 1 87 Thousands/µL      Monocytes Absolute 0 31 Thousand/µL      Eosinophils Absolute 0 13 Thousand/µL      Basophils Absolute 0 04 Thousands/µL                  CT abdomen pelvis with contrast   Final Result by Karan Solomon MD (04/08 1830)      1  Bilateral nonobstructing renal calculi, more numerous in the left kidney  2   Otherwise unremarkable CT of the abdomen and pelvis  Workstation performed: [id number]         US pelvis complete w transvaginal   Final Result by Car Flores MD (04/08 1740)       No sonographic evidence for torsion  Workstation performed: [id number]                    Procedures  Procedures         ED Course                                             MDM  Number of Diagnoses or Management Options  RLQ abdominal pain: new and requires workup  Urinary tract infection: new and requires workup  Diagnosis management comments: This is a 27-year-old female presenting to the emergency department today for right lower quadrant pain  Ongoing since yesterday  History of ovarian cyst and notes it feels similar to this  She also notes vaginal spotting  Vital signs are stable  On physical examination, the patient has right lower quadrant pain with a positive McBurney's point tenderness  She is non peritoneal   Lab workup is grossly within normal limits  Urinalysis shows urinary tract infection  CT abdomen and pelvis negative for any acute intra-abdominal pathologies  No sonographic evidence of ovarian torsion  The patient is stable for discharge at this time  Meloxicam and Macrobid sent to the patient's pharmacy  Strict return precautions were given  Recommend PCP and gynecologic follow-up as soon as possible  Also recommend use of Tylenol for pain relief  The patient verifies her understanding and agrees to the plan at this time  All questions answered to the patient's satisfaction         Amount and/or Complexity of Data Reviewed  Clinical lab tests: ordered and reviewed  Tests in the radiology section of CPT®: ordered and reviewed  Review and summarize past medical records: yes        Disposition  Final diagnoses:   RLQ abdominal pain     Time reflects when diagnosis was documented in both MDM as applicable and the Disposition within this note     Time User Action Codes Description Comment    4/8/2022  6:34 PM Tc Macdonald Add [R10 31] RLQ abdominal pain       ED Disposition     ED Disposition Condition Date/Time Comment    Discharge Stable Fri Apr 8, 2022  6:33 PM Maria Isabel Goodman discharge to home/self care  Follow-up Information     Follow up With Specialties Details Why Contact Info Alexx Casillas PA-C Family Medicine, Physician Assistant Schedule an appointment as soon as possible for a visit   [address] [facility] [address]  [address] [place] [id number]  [facility] Emergency Department Emergency Medicine Go to  If symptoms worsen [address] Emergency Department, [address], [place], [place], [address]          Discharge Medication List as of 4/8/2022  6:35 PM      START taking these medications    Details   !! meloxicam (Mobic) 15 mg tablet Take 1 tablet (15 mg total) by mouth daily, Starting Fri 4/8/2022, Normal      !! nitrofurantoin (MACROBID) 100 mg capsule Take 1 capsule (100 mg total) by mouth 2 (two) times a day, Starting Fri 4/8/2022, Normal       !! - Potential duplicate medications found  Please discuss with provider        CONTINUE these medications which have NOT CHANGED    Details   amphetamine-dextroamphetamine (ADDERALL) 20 mg tablet Take 20 mg by mouth daily , Starting Tue 11/5/2019, Historical Med      azaTHIOprine (IMURAN) 50 mg tablet Take 1 tablet (50 mg total) by mouth 2 (two) times a day, Starting Wed 3/2/2022, Normal      azelastine (ASTELIN) 0 1 % nasal spray 2 sprays into each nostril 2 (two) times a day Use in each nostril as directed, Starting Wed 8/4/2021, Normal      buPROPion (WELLBUTRIN SR) 150 mg 12 hr tablet Take 150 mg by mouth 2 (two) times a day , Starting Mon 4/8/2019, Historical Med      cyclobenzaprine (FLEXERIL) 5 mg tablet Take 2 tablets (10 mg total) by mouth daily at bedtime, Starting Fri 1/31/2020, Normal      !! dexamethasone (DECADRON) 1 mg tablet Take 1 tablet (1 mg total) by mouth daily with breakfast, Starting Fri 4/16/2021, Normal      !! dexamethasone (DECADRON) 1 mg tablet Take 1 tablet (1 mg total) by mouth as needed (migraine), Starting Fri 10/1/2021, Normal      diazepam (VALIUM) 5 mg tablet Take 5 mg by mouth every 6 (six) hours as needed , Starting Sat 4/20/2019, Historical Med      famotidine (PEPCID) 20 mg tablet Take 1 tablet (20 mg total) by mouth 2 (two) times a day, Starting Fri 2/4/2022, Normal      fexofenadine (ALLEGRA) 180 MG tablet Take 180 mg by mouth daily, Historical Med      FLUoxetine (PROzac) 10 mg capsule Take 10 mg by mouth daily , Starting Mon 12/23/2019, Historical Med      hydroxychloroquine (PLAQUENIL) 200 mg tablet Take 1 5 tablets (300 mg total) by mouth daily, Starting Fri 2/4/2022, Until Wed 8/3/2022, Normal      Ibuprofen 200 MG CAPS Take 800 mg by mouth every 6 (six) hours as needed, Starting Wed 8/23/2006, Historical Med      ketorolac (TORADOL) 10 mg tablet Take 1 tablet (10 mg total) by mouth every 6 (six) hours as needed for moderate pain, Starting Fri 10/1/2021, Normal      lamoTRIgine (LaMICtal) 150 MG tablet Take 150 mg by mouth daily , Starting Thu 4/1/2021, Historical Med      levonorgestrel (KYLEENA) 19 5 MG intrauterine device 1 Intra Uterine Device by Intrauterine route once, Starting Wed 5/29/2019, Historical Med      levothyroxine 100 mcg tablet TAKE ONE TABLET BY MOUTH ONCE DAILY, Normal      !! meloxicam (Mobic) 15 mg tablet Take 1 tablet (15 mg total) by mouth daily, Starting Fri 2/4/2022, Normal      metroNIDAZOLE (METROGEL) 0 75 % vaginal gel Insert 1 application into the vagina 2 (two) times a week, Starting Mon 2/7/2022, Normal      mupirocin (BACTROBAN) 2 % ointment Apply topically 3 (three) times a day, Starting Tue 1/26/2021, Normal      !! nitrofurantoin (MACROBID) 100 mg capsule Take 1 capsule (100 mg total) by mouth 2 (two) times a day, Starting Thu 6/4/2020, Normal      nitrofurantoin (MACRODANTIN) 50 mg capsule Take 1 capsule (50 mg total) by mouth daily at bedtime as needed (after intercourse), Starting Wed 10/28/2020, Normal      OLANZapine-FLUoxetine (SYMBYAX) 3-25 MG per capsule Take 1 capsule by mouth every evening, Historical Med      Plecanatide (Trulance) 3 MG TABS Take 1 tablet by mouth daily, Starting Fri 4/2/2021, Normal      polyethylene glycol (MIRALAX) 17 g packet Take 17 g by mouth daily, Historical Med      !! predniSONE 2 5 mg tablet Take 1 tablet (2 5 mg total) by mouth daily, Starting Fri 10/22/2021, Normal      !! predniSONE 5 mg tablet 4 tabs x7 days, then 3 tabs x7 days, then 2 tabs x7 days, then 1 tab x7 days, then stop , Normal      prochlorperazine (COMPAZINE) 10 mg tablet Take 1 tablet (10 mg total) by mouth every 6 (six) hours as needed (migraine), Starting Fri 10/1/2021, Normal      !! Rimegepant Sulfate (Nurtec) 75 MG TBDP Take 75 mg by mouth as needed (migraine), Starting Fri 1/8/2021, Normal      !!  Rimegepant Sulfate (Nurtec) 75 MG TBDP Take 75 mg by mouth every other day, Starting Fri 6/11/2021, Normal      SUMAtriptan (IMITREX) 100 mg tablet Take 1 tablet (100 mg total) by mouth as needed for migraine (migraine), Starting Fri 10/1/2021, Normal      tiZANidine (ZANAFLEX) 2 mg tablet Take 1 tablet (2 mg total) by mouth every 8 (eight) hours as needed for muscle spasms, Starting Fri 2/4/2022, Normal      topiramate (TOPAMAX) 50 MG tablet 1 tab in the am and 2 tabs at bedtime, Normal      Ubrogepant (UBRELVY) 100 MG tablet Take 1 tablet (100 mg) one time as needed for migraine  May repeat one additional tablet (100 mg) at least two hours after the first dose  Do not use more than two doses per day, or for more than 10 days per month , Normal       !! - Potential duplicate medications found  Please discuss with provider  No discharge procedures on file      PDMP Review     None          ED Provider  Electronically Signed by           Nelson Banerjee PA-C  04/08/22 4721

## 2022-04-22 ENCOUNTER — OFFICE VISIT (OUTPATIENT)
Dept: NEUROLOGY | Facility: CLINIC | Age: 32
End: 2022-04-22
Payer: COMMERCIAL

## 2022-04-22 VITALS — HEART RATE: 96 BPM | TEMPERATURE: 97.1 F | DIASTOLIC BLOOD PRESSURE: 74 MMHG | SYSTOLIC BLOOD PRESSURE: 115 MMHG

## 2022-04-22 DIAGNOSIS — G43.701 CHRONIC MIGRAINE WITHOUT AURA WITH STATUS MIGRAINOSUS, NOT INTRACTABLE: ICD-10-CM

## 2022-04-22 DIAGNOSIS — M79.18 CERVICAL MYOFASCIAL PAIN SYNDROME: Primary | ICD-10-CM

## 2022-04-22 PROCEDURE — 99214 OFFICE O/P EST MOD 30 MIN: CPT | Performed by: PHYSICIAN ASSISTANT

## 2022-04-22 PROCEDURE — 20553 NJX 1/MLT TRIGGER POINTS 3/>: CPT | Performed by: PHYSICIAN ASSISTANT

## 2022-04-22 RX ORDER — BUPIVACAINE HYDROCHLORIDE 2.5 MG/ML
8 INJECTION, SOLUTION EPIDURAL; INFILTRATION; INTRACAUDAL ONCE
Status: COMPLETED | OUTPATIENT
Start: 2022-04-22 | End: 2022-04-22

## 2022-04-22 RX ADMIN — BUPIVACAINE HYDROCHLORIDE 8 ML: 2.5 INJECTION, SOLUTION EPIDURAL; INFILTRATION; INTRACAUDAL at 13:47

## 2022-04-22 NOTE — PROGRESS NOTES
Patient ID: Mynor Crook is a 28 y o  female  Assessment/Plan:    Cervical myofascial pain syndrome  Continue TPI as part of ongoing and continuous management and care    Chronic migraine without aura with status migrainosus, not intractable  PREVENTATIVE  Continue Botox every 3 months  Topamax 50 mg BID  Continue all your other medications prescribed  Cyclobenzaprine 5 mg at bedtime     ABORTIVE:  At onset of migraine, use ubrelvy 100 mg  May use again in 2 hours if needed  LImit of 200 mg in 24 hours  In addition you can try Prochlorperazine 10 mg to break your migraine, either alone or with imitrex  If repeat the Sumatriptan or still have lingering pain Toradol 10 mg at bedtime  If you wake up next day with a migraine, Decadron 1 mg         Problem List Items Addressed This Visit        Cardiovascular and Mediastinum    Chronic migraine without aura with status migrainosus, not intractable     PREVENTATIVE  Continue Botox every 3 months  Topamax 50 mg BID  Continue all your other medications prescribed  Cyclobenzaprine 5 mg at bedtime     ABORTIVE:  At onset of migraine, use ubrelvy 100 mg  May use again in 2 hours if needed    LImit of 200 mg in 24 hours  In addition you can try Prochlorperazine 10 mg to break your migraine, either alone or with imitrex  If repeat the Sumatriptan or still have lingering pain Toradol 10 mg at bedtime  If you wake up next day with a migraine, Decadron 1 mg         Relevant Medications    bupivacaine (PF) (MARCAINE) 0 25 % injection 8 mL (Completed)       Musculoskeletal and Integument    Cervical myofascial pain syndrome - Primary     Continue TPI as part of ongoing and continuous management and care         Relevant Medications    bupivacaine (PF) (MARCAINE) 0 25 % injection 8 mL (Completed)    triamcinolone acetonide (KENALOG-10) 10 mg/mL injection 20 mg (Completed)    Other Relevant Orders    Inj Trigger Point Single/Multiple (Completed) Subjective:    HPI  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    PREVENTATIVE:  Topamax, Inderal (hypotension and no help), butterbur, B2, Magnesium, Gabapentin (irritablity/anxiety), Tegretol (intially helped but then failed), Trileptal (skin reaction-allergy vs Jerry Roman), Botox  ABORTIVE:  Rizatriptan (ineffective), Imitrex nasal spray (made vomit), Naproxen, Excedrin, Fioricet, Nucynta, Tylenol, Skelaxin, Ibuprofen, Sumatriptan     Alternative therapies used in the past for headaches? Physical therapy, massage  Headache are worse if the patient: moving bowel, concentration, clenching jaw  Headache triggers:  Menses, stress, Caffeine (too little or too much), weather changes, missing meals, exercise, fatigue     Aura/warning and how long does it last - none     What is your current pain level - 4/10--after TPI 0/10      Any family history of migraines? Yes, mother, maternal aunts, maternal grandmother  Any family history of aneurysms? No     Headaches started at what age? 9years old  How often do the headaches occur? for past few weeks was getting daily due to lupus flair, before flair was better controlled 1 a week prior was daily*  What time of the day do the headaches start? Usually wakes up with them  How long do the headaches last? All day  Are you ever headache free? Yes  Describe your usual headache - Throbbing, crushing, pulsating, Pressure,  Sharp, Arleneana Hahn  Where is your headache located? Behind eyes, eyebrow, frontals, occipitalis, neck  What is the intensity of pain? 4-10/10; last 10/10 was Saturday; average 5-6/10  Associated symptoms:   nausea, vomiting (rare)  phonophobia   Problem with concentration  Blurred vision  Red ear   Lacrimation, runny or stuffed-up nose, flushing of face  light-headed or dizzy, stiff or sore neck   Hands or feet tingle or feel numb, prefer to be alone and in a quiet room, unable to work     Number of days missed per month because of headaches:  Work (or school) days: 0   Social or Family activities: occasionally but less      What time of the year do headaches occur more frequently?   none  Have you seen someone else for headaches or pain? Yes, [facility]  Have you had trigger point injection performed and how often? No  Have you had Botox injection performed and how often? Yes   Have you had epidural injections or transforaminal injections performed? No     Have you used CBD or THC for your headaches and how often? No  Are you current pregnant or planning on getting pregnant? No, has Mirena  Have you ever had any Brain imaging? yes       With botox has had a reduction of at least 7 migraine days with less abortive medication, less ER visits which correlates to headache diary      The following portions of the patient's history were reviewed and updated as appropriate: allergies, current medications, past family history, past medical history, past social history, past surgical history and problem list          Objective:    Blood pressure 115/74, pulse 96, temperature (!) 97 1 °F (36 2 °C), temperature source Temporal, not currently breastfeeding  Physical Exam    Neurological Exam  CONSTITUTIONAL: Well developed, well nourished, well groomed  No dysmorphic features  Eyes:  EOM normal      Neck:  Normal ROM, neck supple  HEENT:  Normocephalic atraumatic  Chest:  Respirations regular and unlabored  Psychiatric:  Normal behavior and appropriate affect      MENTAL STATUS  Orientation: Alert and oriented x 3  Fund of knowledge: Intact  MOTOR (Upper and lower extremities)   Bulk/tone/abnormal movement: Normal muscle bulk and tone  COORDINATION   Station/Gait: Normal baseline gait  Universal Protocol:  Consent: Verbal consent obtained  Written consent obtained  Risks and benefits: risks, benefits and alternatives were discussed  Consent given by: patient  Time out: Immediately prior to procedure a "time out" was called to verify the correct patient, procedure, equipment, support staff and site/side marked as required  Patient understanding: patient states understanding of the procedure being performed  Patient consent: the patient's understanding of the procedure matches consent given  Procedure consent: procedure consent matches procedure scheduled  Relevant documents: relevant documents present and verified  Patient identity confirmed: verbally with patient    Supporting Documentation  Trigger Point Injections: multiple trigger points: 3 or more muscle groups     Procedure Details  Location(s):  Needle size: 27 G  Patient tolerance: patient tolerated the procedure well with no immediate complications  Additional procedure details: Using 8 ml of 0 25%bupivicaine and 2 ml of Kenalog 10mg multiple injections were placed in her trapezius bilaterally, occipitalis bilaterally, rhomboid bilaterally, levator scalpulae and cervical paraspinalis bilaterally  This is part of ongoing and continuous management of her care  Patient had immediate relief of her migraine with the injections  This is part of ongoing and continuous management and care              ROS:    Review of Systems   Constitutional: Negative for chills and fever  HENT: Negative for ear pain and sore throat  Eyes: Negative for pain and visual disturbance  Respiratory: Negative for cough and shortness of breath      Cardiovascular: Negative for chest pain and palpitations  Gastrointestinal: Negative for abdominal pain and vomiting  Genitourinary: Negative for dysuria and hematuria  Musculoskeletal: Positive for arthralgias, back pain, neck pain and neck stiffness  Skin: Negative for color change and rash  Neurological: Positive for headaches  Negative for seizures and syncope  All other systems reviewed and are negative    I personally reviewed and updated the ROS that was entered by the medical assistant

## 2022-04-22 NOTE — ASSESSMENT & PLAN NOTE
PREVENTATIVE  Continue Botox every 3 months  Topamax 50 mg BID  Continue all your other medications prescribed  Cyclobenzaprine 5 mg at bedtime     ABORTIVE:  At onset of migraine, use ubrelvy 100 mg  May use again in 2 hours if needed    LImit of 200 mg in 24 hours  In addition you can try Prochlorperazine 10 mg to break your migraine, either alone or with imitrex  If repeat the Sumatriptan or still have lingering pain Toradol 10 mg at bedtime  If you wake up next day with a migraine, Decadron 1 mg

## 2022-05-05 DIAGNOSIS — G43.701 CHRONIC MIGRAINE WITHOUT AURA WITH STATUS MIGRAINOSUS, NOT INTRACTABLE: ICD-10-CM

## 2022-05-05 DIAGNOSIS — G43.709 CHRONIC MIGRAINE WITHOUT AURA WITHOUT STATUS MIGRAINOSUS, NOT INTRACTABLE: ICD-10-CM

## 2022-05-05 DIAGNOSIS — M79.18 CERVICAL MYOFASCIAL PAIN SYNDROME: ICD-10-CM

## 2022-05-05 RX ORDER — TOPIRAMATE 50 MG/1
TABLET, FILM COATED ORAL
Qty: 270 TABLET | Refills: 0 | Status: SHIPPED | OUTPATIENT
Start: 2022-05-05 | End: 2022-06-10 | Stop reason: SDUPTHER

## 2022-05-05 RX ORDER — KETOROLAC TROMETHAMINE 10 MG/1
10 TABLET, FILM COATED ORAL EVERY 6 HOURS PRN
Qty: 10 TABLET | Refills: 0 | Status: SHIPPED | OUTPATIENT
Start: 2022-05-05

## 2022-05-05 RX ORDER — SUMATRIPTAN 100 MG/1
100 TABLET, FILM COATED ORAL AS NEEDED
Qty: 27 TABLET | Refills: 0 | Status: SHIPPED | OUTPATIENT
Start: 2022-05-05

## 2022-05-05 RX ORDER — PROCHLORPERAZINE MALEATE 10 MG
10 TABLET ORAL EVERY 6 HOURS PRN
Qty: 10 TABLET | Refills: 0 | Status: SHIPPED | OUTPATIENT
Start: 2022-05-05

## 2022-05-05 RX ORDER — DEXAMETHASONE 1 MG
1 TABLET ORAL AS NEEDED
Qty: 10 TABLET | Refills: 0 | Status: SHIPPED | OUTPATIENT
Start: 2022-05-05

## 2022-05-05 RX ORDER — DEXAMETHASONE 1 MG
1 TABLET ORAL
Qty: 10 TABLET | Refills: 0 | Status: ON HOLD | OUTPATIENT
Start: 2022-05-05 | End: 2022-06-01

## 2022-05-05 NOTE — TELEPHONE ENCOUNTER
pt called for refills below and states that now that she is off zyprexa, her migraines are almost daily  she is asking if she could possible increase her topramax or is open to other suggestions also  currently taking 50mg in am and 100mg hs    Please advise  [phone] to leave detailed message

## 2022-05-05 NOTE — TELEPHONE ENCOUNTER
Sorry   I didn't see the message below when I refilled  Increase topamax to 100 mg twice a day  If effective I can send in 100 mg tabs

## 2022-05-30 ENCOUNTER — HOSPITAL ENCOUNTER (EMERGENCY)
Facility: HOSPITAL | Age: 32
Discharge: HOME | End: 2022-05-30
Attending: EMERGENCY MEDICINE | Admitting: EMERGENCY MEDICINE
Payer: COMMERCIAL

## 2022-05-30 ENCOUNTER — APPOINTMENT (EMERGENCY)
Dept: CT IMAGING | Facility: HOSPITAL | Age: 32
End: 2022-05-30
Payer: COMMERCIAL

## 2022-05-30 VITALS
SYSTOLIC BLOOD PRESSURE: 134 MMHG | OXYGEN SATURATION: 100 % | HEART RATE: 84 BPM | RESPIRATION RATE: 18 BRPM | DIASTOLIC BLOOD PRESSURE: 79 MMHG | TEMPERATURE: 98.9 F

## 2022-05-30 DIAGNOSIS — N20.0 KIDNEY STONE: Primary | ICD-10-CM

## 2022-05-30 LAB
ALBUMIN SERPL BCP-MCNC: 4.4 G/DL (ref 3.5–5)
ALP SERPL-CCNC: 49 U/L (ref 34–104)
ALT SERPL W P-5'-P-CCNC: 10 U/L (ref 7–52)
AMORPH URATE CRY URNS QL MICRO: ABNORMAL /HPF
ANION GAP SERPL CALCULATED.3IONS-SCNC: 7 MMOL/L (ref 4–13)
AST SERPL W P-5'-P-CCNC: 16 U/L (ref 13–39)
BACTERIA UR QL AUTO: ABNORMAL /HPF
BASOPHILS # BLD AUTO: 0.04 THOUSANDS/ΜL (ref 0–0.1)
BASOPHILS NFR BLD AUTO: 1 % (ref 0–1)
BILIRUB SERPL-MCNC: 0.52 MG/DL (ref 0.2–1)
BILIRUB UR QL STRIP: NEGATIVE
BUN SERPL-MCNC: 19 MG/DL (ref 5–25)
CALCIUM SERPL-MCNC: 9.1 MG/DL (ref 8.4–10.2)
CHLORIDE SERPL-SCNC: 111 MMOL/L (ref 96–108)
CLARITY UR: ABNORMAL
CO2 SERPL-SCNC: 23 MMOL/L (ref 21–32)
COLOR UR: YELLOW
CREAT SERPL-MCNC: 1.04 MG/DL (ref 0.6–1.3)
EOSINOPHIL # BLD AUTO: 0.08 THOUSAND/ΜL (ref 0–0.61)
EOSINOPHIL NFR BLD AUTO: 1 % (ref 0–6)
ERYTHROCYTE [DISTWIDTH] IN BLOOD BY AUTOMATED COUNT: 12.6 % (ref 11.6–15.1)
EXT PREG TEST URINE: NEGATIVE
EXT. CONTROL ED NAV: NORMAL
GFR SERPL CREATININE-BSD FRML MDRD: 71 ML/MIN/1.73SQ M
GLUCOSE SERPL-MCNC: 107 MG/DL (ref 65–140)
GLUCOSE UR STRIP-MCNC: NEGATIVE MG/DL
HCT VFR BLD AUTO: 40.1 % (ref 34.8–46.1)
HGB BLD-MCNC: 13.9 G/DL (ref 11.5–15.4)
HGB UR QL STRIP.AUTO: ABNORMAL
IMM GRANULOCYTES # BLD AUTO: 0.03 THOUSAND/UL (ref 0–0.2)
IMM GRANULOCYTES NFR BLD AUTO: 0 % (ref 0–2)
KETONES UR STRIP-MCNC: ABNORMAL MG/DL
LEUKOCYTE ESTERASE UR QL STRIP: NEGATIVE
LIPASE SERPL-CCNC: 15 U/L (ref 11–82)
LYMPHOCYTES # BLD AUTO: 1.06 THOUSANDS/ΜL (ref 0.6–4.47)
LYMPHOCYTES NFR BLD AUTO: 15 % (ref 14–44)
MCH RBC QN AUTO: 32.6 PG (ref 26.8–34.3)
MCHC RBC AUTO-ENTMCNC: 34.7 G/DL (ref 31.4–37.4)
MCV RBC AUTO: 94 FL (ref 82–98)
MONOCYTES # BLD AUTO: 0.29 THOUSAND/ΜL (ref 0.17–1.22)
MONOCYTES NFR BLD AUTO: 4 % (ref 4–12)
MUCOUS THREADS UR QL AUTO: ABNORMAL
NEUTROPHILS # BLD AUTO: 5.71 THOUSANDS/ΜL (ref 1.85–7.62)
NEUTS SEG NFR BLD AUTO: 79 % (ref 43–75)
NITRITE UR QL STRIP: NEGATIVE
NON-SQ EPI CELLS URNS QL MICRO: ABNORMAL /HPF
NRBC BLD AUTO-RTO: 0 /100 WBCS
PH UR STRIP.AUTO: 7.5 [PH]
PLATELET # BLD AUTO: 262 THOUSANDS/UL (ref 149–390)
PMV BLD AUTO: 12 FL (ref 8.9–12.7)
POTASSIUM SERPL-SCNC: 3.6 MMOL/L (ref 3.5–5.3)
PROT SERPL-MCNC: 6.9 G/DL (ref 6.4–8.4)
PROT UR STRIP-MCNC: NEGATIVE MG/DL
RBC # BLD AUTO: 4.26 MILLION/UL (ref 3.81–5.12)
RBC #/AREA URNS AUTO: ABNORMAL /HPF
SODIUM SERPL-SCNC: 141 MMOL/L (ref 135–147)
SP GR UR STRIP.AUTO: >=1.03 (ref 1–1.03)
UROBILINOGEN UR QL STRIP.AUTO: 1 E.U./DL
WBC # BLD AUTO: 7.21 THOUSAND/UL (ref 4.31–10.16)
WBC #/AREA URNS AUTO: ABNORMAL /HPF

## 2022-05-30 PROCEDURE — 96361 HYDRATE IV INFUSION ADD-ON: CPT

## 2022-05-30 PROCEDURE — 96375 TX/PRO/DX INJ NEW DRUG ADDON: CPT

## 2022-05-30 PROCEDURE — 99284 EMERGENCY DEPT VISIT MOD MDM: CPT

## 2022-05-30 PROCEDURE — 99284 EMERGENCY DEPT VISIT MOD MDM: CPT | Performed by: EMERGENCY MEDICINE

## 2022-05-30 PROCEDURE — 81025 URINE PREGNANCY TEST: CPT | Performed by: EMERGENCY MEDICINE

## 2022-05-30 PROCEDURE — 83690 ASSAY OF LIPASE: CPT | Performed by: EMERGENCY MEDICINE

## 2022-05-30 PROCEDURE — 36415 COLL VENOUS BLD VENIPUNCTURE: CPT | Performed by: EMERGENCY MEDICINE

## 2022-05-30 PROCEDURE — 81001 URINALYSIS AUTO W/SCOPE: CPT | Performed by: EMERGENCY MEDICINE

## 2022-05-30 PROCEDURE — 96374 THER/PROPH/DIAG INJ IV PUSH: CPT

## 2022-05-30 PROCEDURE — 85025 COMPLETE CBC W/AUTO DIFF WBC: CPT | Performed by: EMERGENCY MEDICINE

## 2022-05-30 PROCEDURE — G1004 CDSM NDSC: HCPCS

## 2022-05-30 PROCEDURE — 74176 CT ABD & PELVIS W/O CONTRAST: CPT

## 2022-05-30 PROCEDURE — 80053 COMPREHEN METABOLIC PANEL: CPT | Performed by: EMERGENCY MEDICINE

## 2022-05-30 RX ORDER — ONDANSETRON 4 MG/1
4 TABLET, ORALLY DISINTEGRATING ORAL EVERY 8 HOURS PRN
Qty: 20 TABLET | Refills: 0 | Status: SHIPPED | OUTPATIENT
Start: 2022-05-30 | End: 2022-06-24

## 2022-05-30 RX ORDER — ONDANSETRON 2 MG/ML
4 INJECTION INTRAMUSCULAR; INTRAVENOUS ONCE
Status: COMPLETED | OUTPATIENT
Start: 2022-05-30 | End: 2022-05-30

## 2022-05-30 RX ORDER — OXYCODONE HYDROCHLORIDE AND ACETAMINOPHEN 5; 325 MG/1; MG/1
1 TABLET ORAL EVERY 6 HOURS PRN
Qty: 12 TABLET | Refills: 0 | Status: SHIPPED | OUTPATIENT
Start: 2022-05-30 | End: 2022-06-09

## 2022-05-30 RX ORDER — IBUPROFEN 600 MG/1
600 TABLET ORAL EVERY 6 HOURS PRN
Qty: 30 TABLET | Refills: 0 | Status: SHIPPED | OUTPATIENT
Start: 2022-05-30

## 2022-05-30 RX ORDER — MORPHINE SULFATE 4 MG/ML
4 INJECTION, SOLUTION INTRAMUSCULAR; INTRAVENOUS ONCE
Status: COMPLETED | OUTPATIENT
Start: 2022-05-30 | End: 2022-05-30

## 2022-05-30 RX ORDER — ONDANSETRON 4 MG/1
4 TABLET, ORALLY DISINTEGRATING ORAL EVERY 8 HOURS PRN
Qty: 20 TABLET | Refills: 0 | Status: SHIPPED | OUTPATIENT
Start: 2022-05-30 | End: 2022-05-30 | Stop reason: SDUPTHER

## 2022-05-30 RX ORDER — OXYCODONE HYDROCHLORIDE AND ACETAMINOPHEN 5; 325 MG/1; MG/1
1 TABLET ORAL EVERY 6 HOURS PRN
Qty: 12 TABLET | Refills: 0 | Status: SHIPPED | OUTPATIENT
Start: 2022-05-30 | End: 2022-05-30 | Stop reason: SDUPTHER

## 2022-05-30 RX ORDER — KETOROLAC TROMETHAMINE 30 MG/ML
15 INJECTION, SOLUTION INTRAMUSCULAR; INTRAVENOUS ONCE
Status: COMPLETED | OUTPATIENT
Start: 2022-05-30 | End: 2022-05-30

## 2022-05-30 RX ADMIN — SODIUM CHLORIDE 1000 ML: 0.9 INJECTION, SOLUTION INTRAVENOUS at 12:06

## 2022-05-30 RX ADMIN — MORPHINE SULFATE 4 MG: 4 INJECTION INTRAVENOUS at 13:04

## 2022-05-30 RX ADMIN — ONDANSETRON 4 MG: 2 INJECTION INTRAMUSCULAR; INTRAVENOUS at 12:06

## 2022-05-30 RX ADMIN — KETOROLAC TROMETHAMINE 15 MG: 30 INJECTION, SOLUTION INTRAMUSCULAR at 12:06

## 2022-05-30 NOTE — Clinical Note
[facility] was seen and treated in our emergency department on 5/30/2022  Diagnosis:     Maria Isabel    She may return on this date: 06/01/2022         If you have any questions or concerns, please don't hesitate to call        Cori Moreno DO    ______________________________           _______________          _______________  Hospital Representative                              Date                                Time

## 2022-05-30 NOTE — ED PROVIDER NOTES
History  Chief Complaint   Patient presents with    Abdominal Pain     Abd pain since 9 am  Pt reports vomiting and nausea  Pt reports pain is in the left lower abdomen and left flank  Hx of kidney stones  22-year-old female comes in for evaluation of abdominal pain  Patient states she had abrupt onset of lower abdominal pain at approximately 0900 hours  Patient states the pain was so severe that it made her nauseous and she vomited 1 time  Patient reports that the pain is in left lower quadrant and left flank  Patient does have a known history of kidney stones  Patient denies any fever chills chest pain shortness of breath no diarrhea denies any hematuria  History provided by:  Patient   used: No    Abdominal Pain  Pain location:  LLQ  Pain quality: sharp and stabbing    Pain radiates to:  L flank  Pain severity:  Severe  Onset quality:  Sudden  Duration:  2 hours  Timing:  Constant  Progression:  Worsening  Chronicity:  New  Context: not alcohol use, not recent illness and not trauma    Ineffective treatments:  None tried  Associated symptoms: nausea and vomiting    Associated symptoms: no anorexia, no chest pain, no cough, no diarrhea, no fatigue, no fever, no hematuria and no shortness of breath    Risk factors: no alcohol abuse, no NSAID use and not pregnant        Prior to Admission Medications   Prescriptions Last Dose Informant Patient Reported? Taking?    FLUoxetine (PROzac) 10 mg capsule  Self Yes No   Sig: Take 10 mg by mouth daily    Ibuprofen 200 MG CAPS  Self Yes No   Sig: Take 800 mg by mouth every 6 (six) hours as needed   OLANZapine-FLUoxetine (SYMBYAX) 3-25 MG per capsule  Self Yes No   Sig: Take 1 capsule by mouth every evening   Plecanatide (Trulance) 3 MG TABS   No No   Sig: Take 1 tablet by mouth daily   Patient not taking: Reported on 10/1/2021   Rimegepant Sulfate (Nurtec) 75 MG TBDP   No No   Sig: Take 75 mg by mouth as needed (migraine)   Rimegepant Sulfate (Nurtec) 75 MG TBDP   No No   Sig: Take 75 mg by mouth every other day   Patient not taking: Reported on 2021   SUMAtriptan (IMITREX) 100 mg tablet   No No   Sig: Take 1 tablet (100 mg total) by mouth as needed for migraine (migraine)   Ubrogepant (UBRELVY) 100 MG tablet   No No   Sig: Take 1 tablet (100 mg) one time as needed for migraine  May repeat one additional tablet (100 mg) at least two hours after the first dose  Do not use more than two doses per day, or for more than 10 days per month     amphetamine-dextroamphetamine (ADDERALL) 20 mg tablet  Self Yes No   Sig: Take 20 mg by mouth daily    azaTHIOprine (IMURAN) 50 mg tablet   No No   Sig: Take 1 tablet (50 mg total) by mouth 2 (two) times a day   azelastine (ASTELIN) 0 1 % nasal spray   No No   Si sprays into each nostril 2 (two) times a day Use in each nostril as directed   buPROPion (WELLBUTRIN SR) 150 mg 12 hr tablet  Self Yes No   Sig: Take 150 mg by mouth 2 (two) times a day    cyclobenzaprine (FLEXERIL) 5 mg tablet  Self No No   Sig: Take 2 tablets (10 mg total) by mouth daily at bedtime   Patient taking differently: Take 10 mg by mouth as needed    dexamethasone (DECADRON) 1 mg tablet   No No   Sig: Take 1 tablet (1 mg total) by mouth daily with breakfast   dexamethasone (DECADRON) 1 mg tablet   No No   Sig: Take 1 tablet (1 mg total) by mouth as needed (migraine)   diazepam (VALIUM) 5 mg tablet  Self Yes No   Sig: Take 5 mg by mouth every 6 (six) hours as needed    famotidine (PEPCID) 20 mg tablet   No No   Sig: Take 1 tablet (20 mg total) by mouth 2 (two) times a day   fexofenadine (ALLEGRA) 180 MG tablet  Self Yes No   Sig: Take 180 mg by mouth daily   Patient not taking: Reported on 10/1/2021   hydroxychloroquine (PLAQUENIL) 200 mg tablet   No No   Sig: Take 1 5 tablets (300 mg total) by mouth daily   ketorolac (TORADOL) 10 mg tablet   No No   Sig: Take 1 tablet (10 mg total) by mouth every 6 (six) hours as needed for moderate pain   lamoTRIgine (LaMICtal) 150 MG tablet   Yes No   Sig: Take 150 mg by mouth daily    levonorgestrel (KYLEENA) 19 5 MG intrauterine device  Self Yes No   Si Intra Uterine Device by Intrauterine route once   levothyroxine 100 mcg tablet   No No   Sig: TAKE ONE TABLET BY MOUTH ONCE DAILY   meloxicam (Mobic) 15 mg tablet   No No   Sig: Take 1 tablet (15 mg total) by mouth daily   meloxicam (Mobic) 15 mg tablet   No No   Sig: Take 1 tablet (15 mg total) by mouth daily   metroNIDAZOLE (METROGEL) 0 75 % vaginal gel   No No   Sig: Insert 1 application into the vagina 2 (two) times a week   mupirocin (BACTROBAN) 2 % ointment   No No   Sig: Apply topically 3 (three) times a day   Patient taking differently: Apply 1 application topically 3 (three) times a day as needed    nitrofurantoin (MACROBID) 100 mg capsule  Self No No   Sig: Take 1 capsule (100 mg total) by mouth 2 (two) times a day   Patient not taking: Reported on 10/1/2021   nitrofurantoin (MACROBID) 100 mg capsule   No No   Sig: Take 1 capsule (100 mg total) by mouth 2 (two) times a day   nitrofurantoin (MACRODANTIN) 50 mg capsule  Self No No   Sig: Take 1 capsule (50 mg total) by mouth daily at bedtime as needed (after intercourse)   Patient not taking: Reported on 2021   polyethylene glycol (MIRALAX) 17 g packet  Self Yes No   Sig: Take 17 g by mouth daily   Patient not taking: Reported on 10/1/2021   predniSONE 2 5 mg tablet   No No   Sig: Take 1 tablet (2 5 mg total) by mouth daily   predniSONE 5 mg tablet   No No   Si tabs x7 days, then 3 tabs x7 days, then 2 tabs x7 days, then 1 tab x7 days, then stop    prochlorperazine (COMPAZINE) 10 mg tablet   No No   Sig: Take 1 tablet (10 mg total) by mouth every 6 (six) hours as needed (migraine)   tiZANidine (ZANAFLEX) 2 mg tablet   No No   Sig: Take 1 tablet (2 mg total) by mouth every 8 (eight) hours as needed for muscle spasms   topiramate (TOPAMAX) 50 MG tablet   No No   Si tab in the am and 2 tabs at bedtime      Facility-Administered Medications: None       Past Medical History:   Diagnosis Date    Anxiety     Depression     Encounter for IUD removal and reinsertion 5/29/2019    Lupus ([address] )     Migraines     Ovarian cyst, right     PONV (postoperative nausea and vomiting)     Stress fracture, right foot, initial encounter for fracture        Past Surgical History:   Procedure Laterality Date    COLONOSCOPY      INGUINAL HERNIA REPAIR  10/1998    REDUCTION MAMMAPLASTY  03/2013    TONSILLECTOMY  06/2011    TONSILLECTOMY      UPPER GASTROINTESTINAL ENDOSCOPY      WISDOM TOOTH EXTRACTION         Family History   Problem Relation Age of Onset    Colon cancer Mother     Arthritis Mother     Thyroid disease Mother     Depression Mother     Anxiety disorder Mother     Clotting disorder Mother     Hyperlipidemia Mother     Vesicoureteral reflux Mother     Irritable bowel syndrome Mother     Migraines Mother     Skin cancer Mother     Thyroid disease unspecified Mother     Heart disease Father     Hypertension Father     Arthritis Father     Hyperlipidemia Father     Vesicoureteral reflux Father     Hypertension Brother     Hyperlipidemia Brother     COPD Maternal Grandmother     Stroke Maternal Grandmother     Lung cancer Maternal Grandmother     Arthritis Maternal Grandmother     Asthma Maternal Grandmother     Hyperlipidemia Maternal Grandmother     Vesicoureteral reflux Maternal Grandmother     Heart disease Maternal Grandmother     Hypertension Maternal Grandmother     Migraines Maternal Grandmother     Osteoporosis Maternal Grandmother     Heart attack Maternal Grandfather     Heart disease Maternal Grandfather     Diabetes Maternal Grandfather     Heart disease Paternal Grandmother     Hyperlipidemia Paternal Grandmother     Diabetes Paternal Grandfather     Kidney disease Maternal Aunt     Migraines Maternal Aunt     Heart attack Maternal Uncle     Heart disease Maternal Uncle     Melanoma Maternal Uncle     Lupus Cousin      I have reviewed and agree with the history as documented  E-Cigarette/Vaping    E-Cigarette Use Never User      E-Cigarette/Vaping Substances    Nicotine No     THC No     CBD No     Flavoring No     Other No     Unknown No      Social History     Tobacco Use    Smoking status: Never Smoker    Smokeless tobacco: Never Used   Vaping Use    Vaping Use: Never used   Substance Use Topics    Alcohol use: Yes     Comment: social    Drug use: Never       Review of Systems   Constitutional: Negative for fatigue and fever  HENT: Negative for congestion and ear pain  Eyes: Negative for discharge and redness  Respiratory: Negative for apnea, cough, shortness of breath and wheezing  Cardiovascular: Negative for chest pain  Gastrointestinal: Positive for abdominal pain, nausea and vomiting  Negative for anorexia and diarrhea  Endocrine: Negative for cold intolerance and polydipsia  Genitourinary: Negative for difficulty urinating and hematuria  Musculoskeletal: Negative for arthralgias and back pain  Skin: Negative for color change and rash  Allergic/Immunologic: Negative for environmental allergies and immunocompromised state  Neurological: Negative for numbness and headaches  Hematological: Negative for adenopathy  Does not bruise/bleed easily  Psychiatric/Behavioral: Negative for agitation and behavioral problems  Physical Exam  Physical Exam  Vitals and nursing note reviewed  Constitutional:       General: She is in acute distress  Appearance: Normal appearance  She is well-developed  She is not toxic-appearing  HENT:      Head: Normocephalic and atraumatic  Right Ear: Tympanic membrane and external ear normal       Left Ear: Tympanic membrane and external ear normal       Nose: Nose normal  No nasal deformity or rhinorrhea  Mouth/Throat:      Dentition: Normal dentition  Pharynx: Uvula midline  Eyes:      General: Lids are normal          Right eye: No discharge  Left eye: No discharge  Conjunctiva/sclera: Conjunctivae normal       Pupils: Pupils are equal, round, and reactive to light  Neck:      Vascular: No carotid bruit or JVD  Trachea: Trachea normal    Cardiovascular:      Rate and Rhythm: Normal rate and regular rhythm  No extrasystoles are present  Chest Wall: PMI is not displaced  Pulses: Normal pulses  Pulmonary:      Effort: Pulmonary effort is normal  No accessory muscle usage or respiratory distress  Breath sounds: Normal breath sounds  No wheezing, rhonchi or rales  Abdominal:      General: Bowel sounds are normal       Palpations: Abdomen is soft  Abdomen is not rigid  There is no mass  Tenderness: There is abdominal tenderness in the left lower quadrant  There is left CVA tenderness  There is no guarding or rebound  Musculoskeletal:      Right shoulder: No swelling, deformity or bony tenderness  Normal range of motion  Cervical back: Normal range of motion and neck supple  No deformity, tenderness or bony tenderness  Lymphadenopathy:      Cervical: No cervical adenopathy  Skin:     General: Skin is warm and dry  Findings: No rash  Neurological:      Mental Status: She is alert and oriented to person, place, and time  GCS: GCS eye subscore is 4  GCS verbal subscore is 5  GCS motor subscore is 6  Cranial Nerves: No cranial nerve deficit  Sensory: No sensory deficit  Deep Tendon Reflexes: Reflexes are normal and symmetric     Psychiatric:         Speech: Speech normal          Behavior: Behavior normal          Vital Signs  ED Triage Vitals [05/30/22 1129]   Temperature Pulse Respirations Blood Pressure SpO2   98 9 °F (37 2 °C) 84 18 134/79 100 %      Temp Source Heart Rate Source Patient Position - Orthostatic VS BP Location FiO2 (%)   Oral Monitor -- -- --      Pain Score       10 - Worst Possible Pain           Vitals:    05/30/22 1129   BP: 134/79   Pulse: 84         Visual Acuity      ED Medications  Medications   sodium chloride 0 9 % bolus 1,000 mL (1,000 mL Intravenous New Bag 5/30/22 1206)   ondansetron (ZOFRAN) injection 4 mg (4 mg Intravenous Given 5/30/22 1206)   ketorolac (TORADOL) injection 15 mg (15 mg Intravenous Given 5/30/22 1206)   morphine (PF) 4 mg/mL injection 4 mg (4 mg Intravenous Given 5/30/22 1304)       Diagnostic Studies  Results Reviewed     Procedure Component Value Units Date/Time    Urine Microscopic [[id number]]  (Abnormal) Collected: 05/30/22 1210    Lab Status: Final result Specimen: Urine, Clean Catch Updated: 05/30/22 1245     RBC, UA 0-1 /hpf      WBC, UA None Seen /hpf      Epithelial Cells Occasional /hpf      Bacteria, UA Occasional /hpf      AMORPH URATES Moderate /hpf      MUCUS THREADS Occasional    Comprehensive metabolic panel [[id number]]  (Abnormal) Collected: 05/30/22 1200    Lab Status: Final result Specimen: Blood from Arm, Right Updated: 05/30/22 1230     Sodium 141 mmol/L      Potassium 3 6 mmol/L      Chloride 111 mmol/L      CO2 23 mmol/L      ANION GAP 7 mmol/L      BUN 19 mg/dL      Creatinine 1 04 mg/dL      Glucose 107 mg/dL      Calcium 9 1 mg/dL      AST 16 U/L      ALT 10 U/L      Alkaline Phosphatase 49 U/L      Total Protein 6 9 g/dL      Albumin 4 4 g/dL      Total Bilirubin 0 52 mg/dL      eGFR 71 ml/min/1 73sq m     Narrative:      [place] guidelines for Chronic Kidney Disease (CKD):     Stage 1 with normal or high GFR (GFR > 90 mL/min/1 73 square meters)    Stage 2 Mild CKD (GFR = 60-89 mL/min/1 73 square meters)    Stage 3A Moderate CKD (GFR = 45-59 mL/min/1 73 square meters)    Stage 3B Moderate CKD (GFR = 30-44 mL/min/1 73 square meters)    Stage 4 Severe CKD (GFR = 15-29 mL/min/1 73 square meters)    Stage 5 End Stage CKD (GFR <15 mL/min/1 73 square meters)  Note: GFR calculation is accurate only with a steady state creatinine    Lipase [[id number]]  (Normal) Collected: 05/30/22 1200    Lab Status: Final result Specimen: Blood from Arm, Right Updated: 05/30/22 1230     Lipase 15 u/L     UA w Reflex to Microscopic w Reflex to Culture [[id number]]  (Abnormal) Collected: 05/30/22 1210    Lab Status: Final result Specimen: Urine, Clean Catch Updated: 05/30/22 1224     Color, UA Yellow     Clarity, UA Slightly Cloudy     Specific Gravity, UA >=1 030     pH, UA 7 5     Leukocytes, UA Negative     Nitrite, UA Negative     Protein, UA Negative mg/dl      Glucose, UA Negative mg/dl      Ketones, UA 15 (1+) mg/dl      Urobilinogen, UA 1 0 E U /dl      Bilirubin, UA Negative     Blood, UA Small    POCT pregnancy, urine [[id number]]  (Normal) Resulted: 05/30/22 1215    Lab Status: Final result Updated: 05/30/22 1215     EXT PREG TEST UR (Ref: Negative) negative     Control valid    CBC and differential [[id number]]  (Abnormal) Collected: 05/30/22 1200    Lab Status: Final result Specimen: Blood from Arm, Right Updated: 05/30/22 1211     WBC 7 21 Thousand/uL      RBC 4 26 Million/uL      Hemoglobin 13 9 g/dL      Hematocrit 40 1 %      MCV 94 fL      MCH 32 6 pg      MCHC 34 7 g/dL      RDW 12 6 %      MPV 12 0 fL      Platelets 545 Thousands/uL      nRBC 0 /100 WBCs      Neutrophils Relative 79 %      Immat GRANS % 0 %      Lymphocytes Relative 15 %      Monocytes Relative 4 %      Eosinophils Relative 1 %      Basophils Relative 1 %      Neutrophils Absolute 5 71 Thousands/µL      Immature Grans Absolute 0 03 Thousand/uL      Lymphocytes Absolute 1 06 Thousands/µL      Monocytes Absolute 0 29 Thousand/µL      Eosinophils Absolute 0 08 Thousand/µL      Basophils Absolute 0 04 Thousands/µL                  CT renal stone study abdomen pelvis without contrast   Final Result by Krista Chery MD (05/30 1252)      2 mm proximal and 3 mm distal left ureteral calculi with mild upstream hydronephroureterosis   No perinephric collection  Punctate bilateral renal calculi  This study demonstrates a significant  finding and was documented as such in [facility] for liaison and referring practitioner notification  Workstation performed: [id number]                    Procedures  Procedures         ED Course                                             MDM  Number of Diagnoses or Management Options  Kidney stone: new and requires workup     Amount and/or Complexity of Data Reviewed  Clinical lab tests: ordered and reviewed  Tests in the radiology section of CPT®: ordered and reviewed  Tests in the medicine section of CPT®: reviewed and ordered    Risk of Complications, Morbidity, and/or Mortality  General comments: Patient had both paper scripts and electronic script sent in because it is a holiday and is unclear if her regular pharmacy is open at this time  Patient Progress  Patient progress: stable      Disposition  Final diagnoses:   Kidney stone     Time reflects when diagnosis was documented in both MDM as applicable and the Disposition within this note     Time User Action Codes Description Comment    5/30/2022  1:00 PM Gary Masterson Add [N20 0] Kidney stone       ED Disposition     ED Disposition   Discharge    Condition   Stable    Date/Time   Mon May 30, 2022  1:07 PM    Comment   Maria Isabel Goodman discharge to home/self care                 Follow-up Information     Follow up With Specialties Details Why Contact Info Additional Jay Casillas PA-C Family Medicine, Physician Assistant Schedule an appointment as soon as possible for a visit   [address] [address]  [facility] Schedule an appointment as soon as possible for a visit   [address] [facility] LOLA MCKENZIE [address] [place] [address] Patient's Medications   Discharge Prescriptions    IBUPROFEN (MOTRIN) 600 MG TABLET    Take 1 tablet (600 mg total) by mouth every 6 (six) hours as needed for mild pain or moderate pain       Start Date: 5/30/2022 End Date: --       Order Dose: 600 mg       Quantity: 30 tablet    Refills: 0    ONDANSETRON (ZOFRAN-ODT) 4 MG DISINTEGRATING TABLET    Take 1 tablet (4 mg total) by mouth every 8 (eight) hours as needed for nausea or vomiting for up to 7 days       Start Date: 5/30/2022 End Date: 6/6/2022       Order Dose: 4 mg       Quantity: 20 tablet    Refills: 0    OXYCODONE-ACETAMINOPHEN (PERCOCET) 5-325 MG PER TABLET    Take 1 tablet by mouth every 6 (six) hours as needed for severe pain for up to 10 days Max Daily Amount: 4 tablets       Start Date: 5/30/2022 End Date: 6/9/2022       Order Dose: 1 tablet       Quantity: 12 tablet    Refills: 0       No discharge procedures on file      PDMP Review     None          ED Provider  Electronically Signed by           Jimbo Villasenor DO  05/30/22 4980

## 2022-06-01 ENCOUNTER — APPOINTMENT (EMERGENCY)
Dept: RADIOLOGY | Facility: HOSPITAL | Age: 32
DRG: 660 | End: 2022-06-01
Payer: COMMERCIAL

## 2022-06-01 ENCOUNTER — HOSPITAL ENCOUNTER (INPATIENT)
Facility: HOSPITAL | Age: 32
LOS: 1 days | Discharge: HOME | DRG: 660 | End: 2022-06-03
Attending: EMERGENCY MEDICINE | Admitting: GENERAL PRACTICE
Payer: COMMERCIAL

## 2022-06-01 ENCOUNTER — APPOINTMENT (OUTPATIENT)
Dept: ULTRASOUND IMAGING | Facility: HOSPITAL | Age: 32
DRG: 660 | End: 2022-06-01
Payer: COMMERCIAL

## 2022-06-01 DIAGNOSIS — N23 RENAL COLIC: ICD-10-CM

## 2022-06-01 DIAGNOSIS — N20.1 URETEROLITHIASIS: Primary | ICD-10-CM

## 2022-06-01 DIAGNOSIS — N20.1 URETEROLITHIASIS: ICD-10-CM

## 2022-06-01 DIAGNOSIS — M35.9 UNDIFFERENTIATED CONNECTIVE TISSUE DISEASE (HCC): ICD-10-CM

## 2022-06-01 LAB
ALBUMIN SERPL BCP-MCNC: 3.9 G/DL (ref 3.5–5)
ALP SERPL-CCNC: 48 U/L (ref 34–104)
ALT SERPL W P-5'-P-CCNC: 7 U/L (ref 7–52)
ANION GAP SERPL CALCULATED.3IONS-SCNC: 7 MMOL/L (ref 4–13)
AST SERPL W P-5'-P-CCNC: 11 U/L (ref 13–39)
BACTERIA UR QL AUTO: NORMAL /HPF
BASOPHILS # BLD AUTO: 0.04 THOUSANDS/ΜL (ref 0–0.1)
BASOPHILS NFR BLD AUTO: 1 % (ref 0–1)
BILIRUB SERPL-MCNC: 0.36 MG/DL (ref 0.2–1)
BILIRUB UR QL STRIP: NEGATIVE
BUN SERPL-MCNC: 15 MG/DL (ref 5–25)
CALCIUM SERPL-MCNC: 9.2 MG/DL (ref 8.4–10.2)
CHLORIDE SERPL-SCNC: 109 MMOL/L (ref 96–108)
CLARITY UR: CLEAR
CO2 SERPL-SCNC: 24 MMOL/L (ref 21–32)
COLOR UR: YELLOW
CREAT SERPL-MCNC: 1.07 MG/DL (ref 0.6–1.3)
EOSINOPHIL # BLD AUTO: 0.18 THOUSAND/ΜL (ref 0–0.61)
EOSINOPHIL NFR BLD AUTO: 4 % (ref 0–6)
ERYTHROCYTE [DISTWIDTH] IN BLOOD BY AUTOMATED COUNT: 12.4 % (ref 11.6–15.1)
EXT PREG TEST URINE: NEGATIVE
EXT. CONTROL ED NAV: NORMAL
GFR SERPL CREATININE-BSD FRML MDRD: 68 ML/MIN/1.73SQ M
GLUCOSE SERPL-MCNC: 92 MG/DL (ref 65–140)
GLUCOSE UR STRIP-MCNC: NEGATIVE MG/DL
HCT VFR BLD AUTO: 36.5 % (ref 34.8–46.1)
HGB BLD-MCNC: 12.9 G/DL (ref 11.5–15.4)
HGB UR QL STRIP.AUTO: ABNORMAL
IMM GRANULOCYTES # BLD AUTO: 0.03 THOUSAND/UL (ref 0–0.2)
IMM GRANULOCYTES NFR BLD AUTO: 1 % (ref 0–2)
KETONES UR STRIP-MCNC: NEGATIVE MG/DL
LACTATE SERPL-SCNC: 0.7 MMOL/L (ref 0.5–2)
LEUKOCYTE ESTERASE UR QL STRIP: NEGATIVE
LIPASE SERPL-CCNC: 21 U/L (ref 11–82)
LYMPHOCYTES # BLD AUTO: 1.51 THOUSANDS/ΜL (ref 0.6–4.47)
LYMPHOCYTES NFR BLD AUTO: 30 % (ref 14–44)
MCH RBC QN AUTO: 33.2 PG (ref 26.8–34.3)
MCHC RBC AUTO-ENTMCNC: 35.3 G/DL (ref 31.4–37.4)
MCV RBC AUTO: 94 FL (ref 82–98)
MONOCYTES # BLD AUTO: 0.34 THOUSAND/ΜL (ref 0.17–1.22)
MONOCYTES NFR BLD AUTO: 7 % (ref 4–12)
NEUTROPHILS # BLD AUTO: 2.98 THOUSANDS/ΜL (ref 1.85–7.62)
NEUTS SEG NFR BLD AUTO: 57 % (ref 43–75)
NITRITE UR QL STRIP: NEGATIVE
NON-SQ EPI CELLS URNS QL MICRO: NORMAL /HPF
NRBC BLD AUTO-RTO: 0 /100 WBCS
PH UR STRIP.AUTO: 8 [PH]
PLATELET # BLD AUTO: 268 THOUSANDS/UL (ref 149–390)
PMV BLD AUTO: 11.7 FL (ref 8.9–12.7)
POTASSIUM SERPL-SCNC: 3.8 MMOL/L (ref 3.5–5.3)
PROT SERPL-MCNC: 6.5 G/DL (ref 6.4–8.4)
PROT UR STRIP-MCNC: NEGATIVE MG/DL
RBC # BLD AUTO: 3.89 MILLION/UL (ref 3.81–5.12)
RBC #/AREA URNS AUTO: NORMAL /HPF
SODIUM SERPL-SCNC: 140 MMOL/L (ref 135–147)
SP GR UR STRIP.AUTO: 1.01 (ref 1–1.03)
UROBILINOGEN UR QL STRIP.AUTO: 0.2 E.U./DL
WBC # BLD AUTO: 5.08 THOUSAND/UL (ref 4.31–10.16)
WBC #/AREA URNS AUTO: NORMAL /HPF

## 2022-06-01 PROCEDURE — 96374 THER/PROPH/DIAG INJ IV PUSH: CPT

## 2022-06-01 PROCEDURE — 96375 TX/PRO/DX INJ NEW DRUG ADDON: CPT

## 2022-06-01 PROCEDURE — 99285 EMERGENCY DEPT VISIT HI MDM: CPT | Performed by: PHYSICIAN ASSISTANT

## 2022-06-01 PROCEDURE — 96361 HYDRATE IV INFUSION ADD-ON: CPT

## 2022-06-01 PROCEDURE — 83605 ASSAY OF LACTIC ACID: CPT | Performed by: PHYSICIAN ASSISTANT

## 2022-06-01 PROCEDURE — 36415 COLL VENOUS BLD VENIPUNCTURE: CPT

## 2022-06-01 PROCEDURE — 83690 ASSAY OF LIPASE: CPT

## 2022-06-01 PROCEDURE — 81001 URINALYSIS AUTO W/SCOPE: CPT

## 2022-06-01 PROCEDURE — 76770 US EXAM ABDO BACK WALL COMP: CPT

## 2022-06-01 PROCEDURE — 85025 COMPLETE CBC W/AUTO DIFF WBC: CPT

## 2022-06-01 PROCEDURE — 99285 EMERGENCY DEPT VISIT HI MDM: CPT

## 2022-06-01 PROCEDURE — 99220 PR INITIAL OBSERVATION CARE/DAY 70 MINUTES: CPT | Performed by: NURSE PRACTITIONER

## 2022-06-01 PROCEDURE — 81025 URINE PREGNANCY TEST: CPT

## 2022-06-01 PROCEDURE — 80053 COMPREHEN METABOLIC PANEL: CPT

## 2022-06-01 PROCEDURE — 74018 RADEX ABDOMEN 1 VIEW: CPT

## 2022-06-01 PROCEDURE — 87086 URINE CULTURE/COLONY COUNT: CPT | Performed by: INTERNAL MEDICINE

## 2022-06-01 RX ORDER — FAMOTIDINE 20 MG/1
20 TABLET, FILM COATED ORAL DAILY
Status: DISCONTINUED | OUTPATIENT
Start: 2022-06-02 | End: 2022-06-03 | Stop reason: HOSPADM

## 2022-06-01 RX ORDER — HYDROXYCHLOROQUINE SULFATE 200 MG/1
300 TABLET, FILM COATED ORAL DAILY
Status: DISCONTINUED | OUTPATIENT
Start: 2022-06-02 | End: 2022-06-03 | Stop reason: HOSPADM

## 2022-06-01 RX ORDER — FLUOXETINE HYDROCHLORIDE 20 MG/1
20 CAPSULE ORAL
Status: DISCONTINUED | OUTPATIENT
Start: 2022-06-01 | End: 2022-06-03 | Stop reason: HOSPADM

## 2022-06-01 RX ORDER — LEVOTHYROXINE SODIUM 0.1 MG/1
100 TABLET ORAL DAILY
Status: DISCONTINUED | OUTPATIENT
Start: 2022-06-02 | End: 2022-06-03 | Stop reason: HOSPADM

## 2022-06-01 RX ORDER — TOPIRAMATE 100 MG/1
100 TABLET, FILM COATED ORAL 2 TIMES DAILY
Status: DISCONTINUED | OUTPATIENT
Start: 2022-06-01 | End: 2022-06-03 | Stop reason: HOSPADM

## 2022-06-01 RX ORDER — PROCHLORPERAZINE MALEATE 10 MG
10 TABLET ORAL EVERY 6 HOURS PRN
Status: DISCONTINUED | OUTPATIENT
Start: 2022-06-01 | End: 2022-06-03 | Stop reason: HOSPADM

## 2022-06-01 RX ORDER — ONDANSETRON 2 MG/ML
4 INJECTION INTRAMUSCULAR; INTRAVENOUS ONCE
Status: COMPLETED | OUTPATIENT
Start: 2022-06-01 | End: 2022-06-01

## 2022-06-01 RX ORDER — TAMSULOSIN HYDROCHLORIDE 0.4 MG/1
0.4 CAPSULE ORAL ONCE
Status: COMPLETED | OUTPATIENT
Start: 2022-06-01 | End: 2022-06-01

## 2022-06-01 RX ORDER — DOCUSATE SODIUM 100 MG/1
100 CAPSULE, LIQUID FILLED ORAL 2 TIMES DAILY
Status: DISCONTINUED | OUTPATIENT
Start: 2022-06-02 | End: 2022-06-03 | Stop reason: HOSPADM

## 2022-06-01 RX ORDER — DIAZEPAM 5 MG/1
5 TABLET ORAL EVERY 6 HOURS PRN
Status: DISCONTINUED | OUTPATIENT
Start: 2022-06-01 | End: 2022-06-03 | Stop reason: HOSPADM

## 2022-06-01 RX ORDER — ONDANSETRON 2 MG/ML
4 INJECTION INTRAMUSCULAR; INTRAVENOUS EVERY 6 HOURS PRN
Status: DISCONTINUED | OUTPATIENT
Start: 2022-06-01 | End: 2022-06-02

## 2022-06-01 RX ORDER — SODIUM CHLORIDE 9 MG/ML
125 INJECTION, SOLUTION INTRAVENOUS CONTINUOUS
Status: DISCONTINUED | OUTPATIENT
Start: 2022-06-01 | End: 2022-06-03 | Stop reason: HOSPADM

## 2022-06-01 RX ORDER — AZATHIOPRINE 50 MG/1
50 TABLET ORAL 2 TIMES DAILY
Status: DISCONTINUED | OUTPATIENT
Start: 2022-06-01 | End: 2022-06-03 | Stop reason: HOSPADM

## 2022-06-01 RX ORDER — DEXTROAMPHETAMINE SACCHARATE, AMPHETAMINE ASPARTATE, DEXTROAMPHETAMINE SULFATE AND AMPHETAMINE SULFATE 2.5; 2.5; 2.5; 2.5 MG/1; MG/1; MG/1; MG/1
20 TABLET ORAL DAILY
Status: DISCONTINUED | OUTPATIENT
Start: 2022-06-02 | End: 2022-06-03 | Stop reason: HOSPADM

## 2022-06-01 RX ORDER — HYDROMORPHONE HCL/PF 1 MG/ML
0.5 SYRINGE (ML) INJECTION EVERY 4 HOURS PRN
Status: DISCONTINUED | OUTPATIENT
Start: 2022-06-01 | End: 2022-06-02

## 2022-06-01 RX ORDER — HYDROMORPHONE HCL IN WATER/PF 6 MG/30 ML
0.2 PATIENT CONTROLLED ANALGESIA SYRINGE INTRAVENOUS EVERY 4 HOURS PRN
Status: DISCONTINUED | OUTPATIENT
Start: 2022-06-01 | End: 2022-06-02

## 2022-06-01 RX ORDER — ACETAMINOPHEN 325 MG/1
650 TABLET ORAL EVERY 6 HOURS PRN
Status: DISCONTINUED | OUTPATIENT
Start: 2022-06-01 | End: 2022-06-03 | Stop reason: HOSPADM

## 2022-06-01 RX ORDER — BUPROPION HYDROCHLORIDE 150 MG/1
150 TABLET, EXTENDED RELEASE ORAL 2 TIMES DAILY
Status: DISCONTINUED | OUTPATIENT
Start: 2022-06-01 | End: 2022-06-03 | Stop reason: HOSPADM

## 2022-06-01 RX ORDER — KETOROLAC TROMETHAMINE 30 MG/ML
15 INJECTION, SOLUTION INTRAMUSCULAR; INTRAVENOUS ONCE
Status: COMPLETED | OUTPATIENT
Start: 2022-06-01 | End: 2022-06-01

## 2022-06-01 RX ORDER — MELOXICAM 7.5 MG/1
15 TABLET ORAL DAILY
Status: DISCONTINUED | OUTPATIENT
Start: 2022-06-02 | End: 2022-06-03 | Stop reason: HOSPADM

## 2022-06-01 RX ORDER — LAMOTRIGINE 100 MG/1
150 TABLET ORAL DAILY
Status: DISCONTINUED | OUTPATIENT
Start: 2022-06-02 | End: 2022-06-03 | Stop reason: HOSPADM

## 2022-06-01 RX ADMIN — TAMSULOSIN HYDROCHLORIDE 0.4 MG: 0.4 CAPSULE ORAL at 20:34

## 2022-06-01 RX ADMIN — SODIUM CHLORIDE 1000 ML: 0.9 INJECTION, SOLUTION INTRAVENOUS at 20:33

## 2022-06-01 RX ADMIN — ONDANSETRON 4 MG: 2 INJECTION INTRAMUSCULAR; INTRAVENOUS at 20:34

## 2022-06-01 RX ADMIN — KETOROLAC TROMETHAMINE 15 MG: 30 INJECTION, SOLUTION INTRAMUSCULAR at 20:34

## 2022-06-02 ENCOUNTER — TELEPHONE (OUTPATIENT)
Dept: UROLOGY | Facility: CLINIC | Age: 32
End: 2022-06-02

## 2022-06-02 ENCOUNTER — ANESTHESIA EVENT (OUTPATIENT)
Dept: PERIOP | Facility: HOSPITAL | Age: 32
DRG: 660 | End: 2022-06-02
Payer: COMMERCIAL

## 2022-06-02 ENCOUNTER — APPOINTMENT (OUTPATIENT)
Dept: RADIOLOGY | Facility: HOSPITAL | Age: 32
DRG: 660 | End: 2022-06-02
Payer: COMMERCIAL

## 2022-06-02 ENCOUNTER — ANESTHESIA (OUTPATIENT)
Dept: PERIOP | Facility: HOSPITAL | Age: 32
DRG: 660 | End: 2022-06-02
Payer: COMMERCIAL

## 2022-06-02 LAB
ANION GAP SERPL CALCULATED.3IONS-SCNC: 5 MMOL/L (ref 4–13)
BASOPHILS # BLD AUTO: 0.03 THOUSANDS/ΜL (ref 0–0.1)
BASOPHILS NFR BLD AUTO: 0 % (ref 0–1)
BUN SERPL-MCNC: 16 MG/DL (ref 5–25)
CALCIUM SERPL-MCNC: 8.1 MG/DL (ref 8.4–10.2)
CHLORIDE SERPL-SCNC: 113 MMOL/L (ref 96–108)
CO2 SERPL-SCNC: 21 MMOL/L (ref 21–32)
CREAT SERPL-MCNC: 0.99 MG/DL (ref 0.6–1.3)
EOSINOPHIL # BLD AUTO: 0.16 THOUSAND/ΜL (ref 0–0.61)
EOSINOPHIL NFR BLD AUTO: 2 % (ref 0–6)
ERYTHROCYTE [DISTWIDTH] IN BLOOD BY AUTOMATED COUNT: 12.3 % (ref 11.6–15.1)
GFR SERPL CREATININE-BSD FRML MDRD: 75 ML/MIN/1.73SQ M
GLUCOSE SERPL-MCNC: 83 MG/DL (ref 65–140)
HCT VFR BLD AUTO: 33.4 % (ref 34.8–46.1)
HGB BLD-MCNC: 11.2 G/DL (ref 11.5–15.4)
IMM GRANULOCYTES # BLD AUTO: 0.04 THOUSAND/UL (ref 0–0.2)
IMM GRANULOCYTES NFR BLD AUTO: 1 % (ref 0–2)
LYMPHOCYTES # BLD AUTO: 1.69 THOUSANDS/ΜL (ref 0.6–4.47)
LYMPHOCYTES NFR BLD AUTO: 23 % (ref 14–44)
MCH RBC QN AUTO: 32.4 PG (ref 26.8–34.3)
MCHC RBC AUTO-ENTMCNC: 33.5 G/DL (ref 31.4–37.4)
MCV RBC AUTO: 97 FL (ref 82–98)
MONOCYTES # BLD AUTO: 0.52 THOUSAND/ΜL (ref 0.17–1.22)
MONOCYTES NFR BLD AUTO: 7 % (ref 4–12)
NEUTROPHILS # BLD AUTO: 4.94 THOUSANDS/ΜL (ref 1.85–7.62)
NEUTS SEG NFR BLD AUTO: 67 % (ref 43–75)
NRBC BLD AUTO-RTO: 0 /100 WBCS
PLATELET # BLD AUTO: 222 THOUSANDS/UL (ref 149–390)
PMV BLD AUTO: 11.7 FL (ref 8.9–12.7)
POTASSIUM SERPL-SCNC: 4 MMOL/L (ref 3.5–5.3)
RBC # BLD AUTO: 3.46 MILLION/UL (ref 3.81–5.12)
SODIUM SERPL-SCNC: 139 MMOL/L (ref 135–147)
WBC # BLD AUTO: 7.38 THOUSAND/UL (ref 4.31–10.16)

## 2022-06-02 PROCEDURE — 0T778DZ DILATION OF LEFT URETER WITH INTRALUMINAL DEVICE, VIA NATURAL OR ARTIFICIAL OPENING ENDOSCOPIC: ICD-10-PCS | Performed by: RADIOLOGY

## 2022-06-02 PROCEDURE — 80048 BASIC METABOLIC PNL TOTAL CA: CPT | Performed by: NURSE PRACTITIONER

## 2022-06-02 PROCEDURE — C1894 INTRO/SHEATH, NON-LASER: HCPCS | Performed by: UROLOGY

## 2022-06-02 PROCEDURE — C2617 STENT, NON-COR, TEM W/O DEL: HCPCS | Performed by: UROLOGY

## 2022-06-02 PROCEDURE — C1758 CATHETER, URETERAL: HCPCS | Performed by: UROLOGY

## 2022-06-02 PROCEDURE — 85025 COMPLETE CBC W/AUTO DIFF WBC: CPT | Performed by: NURSE PRACTITIONER

## 2022-06-02 PROCEDURE — 99233 SBSQ HOSP IP/OBS HIGH 50: CPT | Performed by: GENERAL PRACTICE

## 2022-06-02 PROCEDURE — 74420 UROGRAPHY RTRGR +-KUB: CPT

## 2022-06-02 PROCEDURE — C1769 GUIDE WIRE: HCPCS | Performed by: UROLOGY

## 2022-06-02 PROCEDURE — 52351 CYSTOURETERO & OR PYELOSCOPE: CPT | Performed by: UROLOGY

## 2022-06-02 PROCEDURE — 52332 CYSTOSCOPY AND TREATMENT: CPT | Performed by: UROLOGY

## 2022-06-02 PROCEDURE — BT1FZZZ FLUOROSCOPY OF LEFT KIDNEY, URETER AND BLADDER: ICD-10-PCS | Performed by: RADIOLOGY

## 2022-06-02 PROCEDURE — 99255 IP/OBS CONSLTJ NEW/EST HI 80: CPT | Performed by: UROLOGY

## 2022-06-02 DEVICE — STENT URETERAL 6FR 24CML INLAY OPTIMA: Type: IMPLANTABLE DEVICE | Site: URETER | Status: FUNCTIONAL

## 2022-06-02 RX ORDER — SODIUM CHLORIDE 9 MG/ML
INJECTION, SOLUTION INTRAVENOUS CONTINUOUS PRN
Status: DISCONTINUED | OUTPATIENT
Start: 2022-06-02 | End: 2022-06-02

## 2022-06-02 RX ORDER — SCOLOPAMINE TRANSDERMAL SYSTEM 1 MG/1
1 PATCH, EXTENDED RELEASE TRANSDERMAL
Status: DISCONTINUED | OUTPATIENT
Start: 2022-06-02 | End: 2022-06-03 | Stop reason: HOSPADM

## 2022-06-02 RX ORDER — ONDANSETRON 4 MG/1
4 TABLET, ORALLY DISINTEGRATING ORAL ONCE
Status: COMPLETED | OUTPATIENT
Start: 2022-06-02 | End: 2022-06-02

## 2022-06-02 RX ORDER — OXYCODONE HYDROCHLORIDE 5 MG/1
5 TABLET ORAL EVERY 4 HOURS PRN
Status: DISCONTINUED | OUTPATIENT
Start: 2022-06-02 | End: 2022-06-03 | Stop reason: HOSPADM

## 2022-06-02 RX ORDER — LEVOFLOXACIN 5 MG/ML
500 INJECTION, SOLUTION INTRAVENOUS ONCE
Status: COMPLETED | OUTPATIENT
Start: 2022-06-02 | End: 2022-06-02

## 2022-06-02 RX ORDER — ONDANSETRON 2 MG/ML
INJECTION INTRAMUSCULAR; INTRAVENOUS AS NEEDED
Status: DISCONTINUED | OUTPATIENT
Start: 2022-06-02 | End: 2022-06-02

## 2022-06-02 RX ORDER — SODIUM CHLORIDE, SODIUM LACTATE, POTASSIUM CHLORIDE, CALCIUM CHLORIDE 600; 310; 30; 20 MG/100ML; MG/100ML; MG/100ML; MG/100ML
100 INJECTION, SOLUTION INTRAVENOUS CONTINUOUS
Status: DISCONTINUED | OUTPATIENT
Start: 2022-06-02 | End: 2022-06-03 | Stop reason: HOSPADM

## 2022-06-02 RX ORDER — PROMETHAZINE HYDROCHLORIDE 25 MG/ML
6.25 INJECTION, SOLUTION INTRAMUSCULAR; INTRAVENOUS
Status: DISCONTINUED | OUTPATIENT
Start: 2022-06-02 | End: 2022-06-03 | Stop reason: HOSPADM

## 2022-06-02 RX ORDER — HYDROMORPHONE HCL IN WATER/PF 6 MG/30 ML
0.2 PATIENT CONTROLLED ANALGESIA SYRINGE INTRAVENOUS EVERY 4 HOURS PRN
Status: DISCONTINUED | OUTPATIENT
Start: 2022-06-02 | End: 2022-06-03 | Stop reason: HOSPADM

## 2022-06-02 RX ORDER — DEXAMETHASONE SODIUM PHOSPHATE 10 MG/ML
INJECTION, SOLUTION INTRAMUSCULAR; INTRAVENOUS AS NEEDED
Status: DISCONTINUED | OUTPATIENT
Start: 2022-06-02 | End: 2022-06-02

## 2022-06-02 RX ORDER — METOCLOPRAMIDE HYDROCHLORIDE 5 MG/ML
10 INJECTION INTRAMUSCULAR; INTRAVENOUS EVERY 6 HOURS PRN
Status: DISCONTINUED | OUTPATIENT
Start: 2022-06-02 | End: 2022-06-03 | Stop reason: HOSPADM

## 2022-06-02 RX ORDER — OXYCODONE HYDROCHLORIDE 5 MG/1
2.5 TABLET ORAL EVERY 4 HOURS PRN
Status: DISCONTINUED | OUTPATIENT
Start: 2022-06-02 | End: 2022-06-03 | Stop reason: HOSPADM

## 2022-06-02 RX ORDER — MAGNESIUM HYDROXIDE 1200 MG/15ML
LIQUID ORAL AS NEEDED
Status: DISCONTINUED | OUTPATIENT
Start: 2022-06-02 | End: 2022-06-02 | Stop reason: HOSPADM

## 2022-06-02 RX ORDER — LIDOCAINE HYDROCHLORIDE 10 MG/ML
INJECTION, SOLUTION EPIDURAL; INFILTRATION; INTRACAUDAL; PERINEURAL AS NEEDED
Status: DISCONTINUED | OUTPATIENT
Start: 2022-06-02 | End: 2022-06-02

## 2022-06-02 RX ORDER — METOCLOPRAMIDE HYDROCHLORIDE 5 MG/ML
10 INJECTION INTRAMUSCULAR; INTRAVENOUS ONCE
Status: COMPLETED | OUTPATIENT
Start: 2022-06-02 | End: 2022-06-02

## 2022-06-02 RX ORDER — SUCCINYLCHOLINE/SOD CL,ISO/PF 100 MG/5ML
SYRINGE (ML) INTRAVENOUS AS NEEDED
Status: DISCONTINUED | OUTPATIENT
Start: 2022-06-02 | End: 2022-06-02

## 2022-06-02 RX ORDER — SUMATRIPTAN 25 MG/1
100 TABLET, FILM COATED ORAL ONCE
Status: COMPLETED | OUTPATIENT
Start: 2022-06-02 | End: 2022-06-02

## 2022-06-02 RX ORDER — PROPOFOL 10 MG/ML
INJECTION, EMULSION INTRAVENOUS AS NEEDED
Status: DISCONTINUED | OUTPATIENT
Start: 2022-06-02 | End: 2022-06-02

## 2022-06-02 RX ORDER — FENTANYL CITRATE/PF 50 MCG/ML
50 SYRINGE (ML) INJECTION
Status: DISCONTINUED | OUTPATIENT
Start: 2022-06-02 | End: 2022-06-02 | Stop reason: HOSPADM

## 2022-06-02 RX ORDER — HYDROMORPHONE HCL IN WATER/PF 6 MG/30 ML
0.2 PATIENT CONTROLLED ANALGESIA SYRINGE INTRAVENOUS
Status: DISCONTINUED | OUTPATIENT
Start: 2022-06-02 | End: 2022-06-02 | Stop reason: HOSPADM

## 2022-06-02 RX ORDER — SODIUM CHLORIDE, SODIUM LACTATE, POTASSIUM CHLORIDE, CALCIUM CHLORIDE 600; 310; 30; 20 MG/100ML; MG/100ML; MG/100ML; MG/100ML
INJECTION, SOLUTION INTRAVENOUS CONTINUOUS PRN
Status: DISCONTINUED | OUTPATIENT
Start: 2022-06-02 | End: 2022-06-02

## 2022-06-02 RX ORDER — ONDANSETRON 2 MG/ML
4 INJECTION INTRAMUSCULAR; INTRAVENOUS ONCE AS NEEDED
Status: COMPLETED | OUTPATIENT
Start: 2022-06-02 | End: 2022-06-02

## 2022-06-02 RX ORDER — FENTANYL CITRATE 50 UG/ML
INJECTION, SOLUTION INTRAMUSCULAR; INTRAVENOUS AS NEEDED
Status: DISCONTINUED | OUTPATIENT
Start: 2022-06-02 | End: 2022-06-02

## 2022-06-02 RX ORDER — TAMSULOSIN HYDROCHLORIDE 0.4 MG/1
0.4 CAPSULE ORAL
Status: DISCONTINUED | OUTPATIENT
Start: 2022-06-02 | End: 2022-06-03 | Stop reason: HOSPADM

## 2022-06-02 RX ORDER — MIDAZOLAM HYDROCHLORIDE 2 MG/2ML
INJECTION, SOLUTION INTRAMUSCULAR; INTRAVENOUS AS NEEDED
Status: DISCONTINUED | OUTPATIENT
Start: 2022-06-02 | End: 2022-06-02

## 2022-06-02 RX ORDER — PROPOFOL 10 MG/ML
INJECTION, EMULSION INTRAVENOUS CONTINUOUS PRN
Status: DISCONTINUED | OUTPATIENT
Start: 2022-06-02 | End: 2022-06-02

## 2022-06-02 RX ORDER — DEXMEDETOMIDINE HYDROCHLORIDE 100 UG/ML
INJECTION, SOLUTION INTRAVENOUS AS NEEDED
Status: DISCONTINUED | OUTPATIENT
Start: 2022-06-02 | End: 2022-06-02

## 2022-06-02 RX ADMIN — DOCUSATE SODIUM 100 MG: 100 CAPSULE, LIQUID FILLED ORAL at 18:23

## 2022-06-02 RX ADMIN — LIDOCAINE HYDROCHLORIDE 50 MG: 10 INJECTION, SOLUTION EPIDURAL; INFILTRATION; INTRACAUDAL; PERINEURAL at 14:02

## 2022-06-02 RX ADMIN — PROMETHAZINE HYDROCHLORIDE 6.25 MG: 25 INJECTION INTRAMUSCULAR; INTRAVENOUS at 16:26

## 2022-06-02 RX ADMIN — FENTANYL CITRATE 50 MCG: 50 INJECTION INTRAMUSCULAR; INTRAVENOUS at 15:55

## 2022-06-02 RX ADMIN — BUPROPION HYDROCHLORIDE 150 MG: 150 TABLET, EXTENDED RELEASE ORAL at 21:52

## 2022-06-02 RX ADMIN — TOPIRAMATE 100 MG: 100 TABLET, FILM COATED ORAL at 21:52

## 2022-06-02 RX ADMIN — ONDANSETRON 4 MG: 2 INJECTION INTRAMUSCULAR; INTRAVENOUS at 00:50

## 2022-06-02 RX ADMIN — SODIUM CHLORIDE: 0.9 INJECTION, SOLUTION INTRAVENOUS at 13:55

## 2022-06-02 RX ADMIN — Medication 100 MG: at 14:02

## 2022-06-02 RX ADMIN — AZATHIOPRINE 50 MG: 50 TABLET ORAL at 00:17

## 2022-06-02 RX ADMIN — FENTANYL CITRATE 50 MCG: 50 INJECTION INTRAMUSCULAR; INTRAVENOUS at 14:02

## 2022-06-02 RX ADMIN — ONDANSETRON 4 MG: 2 INJECTION INTRAMUSCULAR; INTRAVENOUS at 14:15

## 2022-06-02 RX ADMIN — MIDAZOLAM 1 MG: 1 INJECTION INTRAMUSCULAR; INTRAVENOUS at 13:54

## 2022-06-02 RX ADMIN — AZATHIOPRINE 50 MG: 50 TABLET ORAL at 18:23

## 2022-06-02 RX ADMIN — HYDROMORPHONE HYDROCHLORIDE 0.5 MG: 1 INJECTION, SOLUTION INTRAMUSCULAR; INTRAVENOUS; SUBCUTANEOUS at 00:18

## 2022-06-02 RX ADMIN — METOCLOPRAMIDE HYDROCHLORIDE 10 MG: 5 INJECTION INTRAMUSCULAR; INTRAVENOUS at 02:22

## 2022-06-02 RX ADMIN — PROPOFOL 200 MG: 10 INJECTION, EMULSION INTRAVENOUS at 14:02

## 2022-06-02 RX ADMIN — FAMOTIDINE 20 MG: 20 TABLET ORAL at 09:04

## 2022-06-02 RX ADMIN — BUPROPION HYDROCHLORIDE 150 MG: 150 TABLET, EXTENDED RELEASE ORAL at 00:17

## 2022-06-02 RX ADMIN — LEVOFLOXACIN 500 MG: 5 INJECTION, SOLUTION INTRAVENOUS at 15:43

## 2022-06-02 RX ADMIN — SODIUM CHLORIDE 125 ML/HR: 0.9 INJECTION, SOLUTION INTRAVENOUS at 00:18

## 2022-06-02 RX ADMIN — PROPOFOL 100 MCG/KG/MIN: 10 INJECTION, EMULSION INTRAVENOUS at 14:05

## 2022-06-02 RX ADMIN — FENTANYL CITRATE 50 MCG: 50 INJECTION INTRAMUSCULAR; INTRAVENOUS at 14:16

## 2022-06-02 RX ADMIN — ONDANSETRON 4 MG: 2 INJECTION INTRAMUSCULAR; INTRAVENOUS at 16:00

## 2022-06-02 RX ADMIN — SUMATRIPTAN SUCCINATE 100 MG: 25 TABLET ORAL at 09:03

## 2022-06-02 RX ADMIN — FLUOXETINE HYDROCHLORIDE 20 MG: 20 CAPSULE ORAL at 00:17

## 2022-06-02 RX ADMIN — HYDROXYCHLOROQUINE SULFATE 300 MG: 200 TABLET, FILM COATED ORAL at 09:06

## 2022-06-02 RX ADMIN — LEVOTHYROXINE SODIUM 100 MCG: 100 TABLET ORAL at 06:05

## 2022-06-02 RX ADMIN — SODIUM CHLORIDE: 0.9 INJECTION, SOLUTION INTRAVENOUS at 14:25

## 2022-06-02 RX ADMIN — TOPIRAMATE 100 MG: 100 TABLET, FILM COATED ORAL at 00:17

## 2022-06-02 RX ADMIN — ACETAMINOPHEN 650 MG: 325 TABLET ORAL at 06:08

## 2022-06-02 RX ADMIN — LAMOTRIGINE 150 MG: 100 TABLET ORAL at 09:03

## 2022-06-02 RX ADMIN — TAMSULOSIN HYDROCHLORIDE 0.4 MG: 0.4 CAPSULE ORAL at 18:24

## 2022-06-02 RX ADMIN — FLUOXETINE HYDROCHLORIDE 20 MG: 20 CAPSULE ORAL at 21:53

## 2022-06-02 RX ADMIN — AZATHIOPRINE 50 MG: 50 TABLET ORAL at 09:03

## 2022-06-02 RX ADMIN — DOCUSATE SODIUM 100 MG: 100 CAPSULE, LIQUID FILLED ORAL at 09:04

## 2022-06-02 RX ADMIN — DEXAMETHASONE SODIUM PHOSPHATE 10 MG: 10 INJECTION, SOLUTION INTRAMUSCULAR; INTRAVENOUS at 14:15

## 2022-06-02 RX ADMIN — MELOXICAM 15 MG: 7.5 TABLET ORAL at 09:05

## 2022-06-02 RX ADMIN — FENTANYL CITRATE 50 MCG: 50 INJECTION INTRAMUSCULAR; INTRAVENOUS at 15:35

## 2022-06-02 RX ADMIN — DIAZEPAM 5 MG: 5 TABLET ORAL at 01:59

## 2022-06-02 RX ADMIN — SODIUM CHLORIDE, SODIUM LACTATE, POTASSIUM CHLORIDE, AND CALCIUM CHLORIDE 100 ML/HR: .6; .31; .03; .02 INJECTION, SOLUTION INTRAVENOUS at 18:23

## 2022-06-02 RX ADMIN — HYDROMORPHONE HYDROCHLORIDE 0.5 MG: 1 INJECTION, SOLUTION INTRAMUSCULAR; INTRAVENOUS; SUBCUTANEOUS at 02:42

## 2022-06-02 RX ADMIN — METOCLOPRAMIDE HYDROCHLORIDE 10 MG: 5 INJECTION INTRAMUSCULAR; INTRAVENOUS at 09:02

## 2022-06-02 RX ADMIN — SCOPALAMINE 1 PATCH: 1 PATCH, EXTENDED RELEASE TRANSDERMAL at 13:35

## 2022-06-02 RX ADMIN — TOPIRAMATE 100 MG: 100 TABLET, FILM COATED ORAL at 09:03

## 2022-06-02 RX ADMIN — ONDANSETRON 4 MG: 4 TABLET, ORALLY DISINTEGRATING ORAL at 11:05

## 2022-06-02 RX ADMIN — BUPROPION HYDROCHLORIDE 150 MG: 150 TABLET, EXTENDED RELEASE ORAL at 09:03

## 2022-06-02 RX ADMIN — DEXTROAMPHETAMINE SACCHARATE, AMPHETAMINE ASPARTATE, DEXTROAMPHETAMINE SULFATE AND AMPHETAMINE SULFATE 20 MG: 2.5; 2.5; 2.5; 2.5 TABLET ORAL at 09:03

## 2022-06-02 RX ADMIN — DEXMEDETOMIDINE HYDROCHLORIDE 8 MCG: 100 INJECTION, SOLUTION INTRAVENOUS at 14:30

## 2022-06-02 RX ADMIN — MIDAZOLAM 1 MG: 1 INJECTION INTRAMUSCULAR; INTRAVENOUS at 14:00

## 2022-06-02 NOTE — CONSULTS
UROLOGY CONSULTATION NOTE     Patient Identifiers: Bentley Chandra (MRN [id number])  Service Requesting Consultation:Internal Medicine  Service Providing Consultation:  Urology, Tamiko Horan    Date of Service: 6/2/2022  Inpatient consult to Urology  Consult performed by: BAYRON Horan  Consult ordered by: Tamiko Anglin      Reason for Consultation: Nephrolithiasis     ASSESSMENT/PLAN:     Nephrolithiasis  · Previously seen CT scan performed 05/30/2022 reveals a 2 mm calculi in the proximal ureter and a 3 mm calculi in the distal ureter proximal to the UVJ  · Ultrasound performed 06/01/2022 reveals a 2 mm stone in the left UVJ with mild left sided hydronephrosis  · Attempt medical expulsive therapy-ibuprofen, tamsulosin  · A M Labs-No leukocytosis noted CBC, renal function unremarkable  · Urine testing unremarkable for infection  HCG- negative  · NPO  · OR case requested- cystoscopy, LEFT ureteroscopy with laser lithotripsy, retrograde pyelogram and insertion of left ureteral stent  · OR consent signed  · IV fluids, pain control, antiemetics per Medicine  · Strain all urine    History of Present Illness:     Bentley Chandra is a 28 y o  old female presented to the emergency department 06/01/2022 for the complaint left-sided flank pain and back pain  She was initially seen here earlier in the week and diagnosed with a 2 mm proximal and 3 mm distal left ureteral calculi with mild upstream hydroureteronephrosis  On evaluation in the emergency department with x-ray/KUB in the proximal left ureteral calculi was not noted at 3 mm distal left ureteral calculi was seen  Ultrasound kidney bladder performed at that time visualized a 2 mm left UVJ stone with mild left-sided hydronephrosis  She was admitted overnight for pain management and attempted medical expulsive therapy  Unfortunately overnight, patient has been unsuccessful at passing calculi    She continues to have left flank pain with associated nausea  She is moderately uncomfortable at this time and wishes to proceed with ureteroscopy with laser lithotripsy  She reports a history of significant nausea postoperatively secondary to anesthesia  She denies recent onset of chest pain, shortness of breath and dizziness  She reports snoring slightly  Patient reports social use of alcohol otherwise denies smoking and use/abuse of illicit substances  Past medical history includes nephrolithiasis (never requiring  instrumentation), LUPUS, hypothyroidism, migraine headaches, bipolar disorder  Pt is a Psychiatric Physician Assistant with our network      Past Medical, Past Surgical History:     Past Medical History:   Diagnosis Date    Anxiety     Depression     Encounter for IUD removal and reinsertion 5/29/2019    Lupus ([address] )     Migraines     Ovarian cyst, right     PONV (postoperative nausea and vomiting)     Stress fracture, right foot, initial encounter for fracture    :    Past Surgical History:   Procedure Laterality Date    COLONOSCOPY      INGUINAL HERNIA REPAIR  10/1998    REDUCTION MAMMAPLASTY  03/2013    TONSILLECTOMY  06/2011    TONSILLECTOMY      UPPER GASTROINTESTINAL ENDOSCOPY      WISDOM TOOTH EXTRACTION     :    Medications, Allergies:     Current Facility-Administered Medications   Medication Dose Route Frequency    acetaminophen (TYLENOL) tablet 650 mg  650 mg Oral Q6H PRN    amphetamine-dextroamphetamine (ADDERALL) tablet 20 mg  20 mg Oral Daily    azaTHIOprine (IMURAN) tablet 50 mg  50 mg Oral BID    buPROPion (WELLBUTRIN SR) 12 hr tablet 150 mg  150 mg Oral BID    diazepam (VALIUM) tablet 5 mg  5 mg Oral Q6H PRN    docusate sodium (COLACE) capsule 100 mg  100 mg Oral BID    famotidine (PEPCID) tablet 20 mg  20 mg Oral Daily    FLUoxetine (PROzac) capsule 20 mg  20 mg Oral HS    HYDROmorphone (DILAUDID) injection 0 5 mg  0 5 mg Intravenous Q4H PRN    HYDROmorphone (DILAUDID) injection 0 5 mg  0 5 mg Intravenous Q4H PRN    HYDROmorphone HCl (DILAUDID) injection 0 2 mg  0 2 mg Intravenous Q4H PRN    hydroxychloroquine (PLAQUENIL) tablet 300 mg  300 mg Oral Daily    lamoTRIgine (LaMICtal) tablet 150 mg  150 mg Oral Daily    levothyroxine tablet 100 mcg  100 mcg Oral Daily    meloxicam (MOBIC) tablet 15 mg  15 mg Oral Daily    metoclopramide (REGLAN) injection 10 mg  10 mg Intravenous Q6H PRN    prochlorperazine (COMPAZINE) tablet 10 mg  10 mg Oral Q6H PRN    sodium chloride 0 9 % infusion  125 mL/hr Intravenous Continuous    SUMAtriptan (IMITREX) tablet 100 mg  100 mg Oral Once    tamsulosin (FLOMAX) capsule 0 4 mg  0 4 mg Oral Daily With Dinner    topiramate (TOPAMAX) tablet 100 mg  100 mg Oral BID       Allergies: Allergies   Allergen Reactions    Augmentin [Amoxicillin-Pot Clavulanate] Anaphylaxis    Oxcarbazepine Hives     Hives   :    Social and Family History:   Social History:   Social History     Tobacco Use    Smoking status: Never Smoker    Smokeless tobacco: Never Used   Vaping Use    Vaping Use: Never used   Substance Use Topics    Alcohol use: Yes     Comment: social    Drug use: Never         Social History     Tobacco Use   Smoking Status Never Smoker   Smokeless Tobacco Never Used       Family History:  Family History   Problem Relation Age of Onset    Colon cancer Mother     Arthritis Mother     Thyroid disease Mother     Depression Mother     Anxiety disorder Mother     Clotting disorder Mother     Hyperlipidemia Mother     Vesicoureteral reflux Mother     Irritable bowel syndrome Mother    [place] Migraines Mother     Skin cancer Mother     Thyroid disease unspecified Mother     Heart disease Father     Hypertension Father     Arthritis Father     Hyperlipidemia Father     Vesicoureteral reflux Father     Hypertension Brother     Hyperlipidemia Brother     COPD Maternal Grandmother     Stroke Maternal Grandmother     Lung cancer Maternal Grandmother     Arthritis Maternal Grandmother     Asthma Maternal Grandmother     Hyperlipidemia Maternal Grandmother     Vesicoureteral reflux Maternal Grandmother     Heart disease Maternal Grandmother     Hypertension Maternal Grandmother     Migraines Maternal Grandmother     Osteoporosis Maternal Grandmother     Heart attack Maternal Grandfather     Heart disease Maternal Grandfather     Diabetes Maternal Grandfather     Heart disease Paternal Grandmother     Hyperlipidemia Paternal Grandmother     Diabetes Paternal Grandfather     Kidney disease Maternal Aunt     Migraines Maternal Aunt     Heart attack Maternal Uncle     Heart disease Maternal Uncle     Melanoma Maternal Uncle     Lupus Cousin    :     Review of Systems:     General: Fever, chills, or night sweats: negative  Cardiac: Negative for chest pain  Pulmonary: Negative for shortness of breath  Gastrointestinal: Abdominal pain negative  Nausea, vomiting, or diarrhea positive,  Genitourinary: See HPI above  Patient does not have hematuria  All other systems queried were negative  Physical Exam:   General: Patient is pleasant and in NAD  Awake and alert  BP 96/65   Pulse 85   Temp 97 8 °F (36 6 °C)   Resp 18   Ht 4' 11 75" (1 518 m)   Wt 59 kg (130 lb 1 1 oz)   SpO2 98%   BMI 25 62 kg/m² Temp (24hrs), Av 2 °F (36 8 °C), Min:97 5 °F (36 4 °C), Max:99 2 °F (37 3 °C)  current; Temperature: 97 8 °F (36 6 °C)  I/O last 24 hours: In: 1000 [IV Piggyback:1000]  Out: 350 [Urine:350]  Skin: warm, dry, intact  Cardiac: S1S2, HRR, Peripheral edema: negative  Pulmonary: Non-labored breathing  Abdomen: Soft, non-tender, non-distended  No surgical scars  No masses, tenderness, hernias noted  Musculoskeletal: AROM with no joint deformity or tenderness    Neurology: alert, oriented x3, affect appropriate, no focal neurological deficits, moves all extremities well and no involuntary movements  Genitourinary: Positive CVA tenderness, negative suprapubic tenderness  CERRATO:  None    Labs:     Lab Results   Component Value Date    HGB 11 2 (L) 06/02/2022    HCT 33 4 (L) 06/02/2022    WBC 7 38 06/02/2022     06/02/2022       Lab Results   Component Value Date    K 4 0 06/02/2022     (H) 06/02/2022    CO2 21 06/02/2022    BUN 16 06/02/2022    CREATININE 0 99 06/02/2022    CALCIUM 8 1 (L) 06/02/2022       Imaging:   I personally reviewed the images and report of the following studies, and reviewed them with the patient:      RENAL ULTRASOUND     INDICATION:   kidney stones      COMPARISON: CT scan dated May 30, 2022     TECHNIQUE:   Ultrasound of the retroperitoneum was performed with a curvilinear transducer utilizing volumetric sweeps and still imaging techniques       FINDINGS:     KIDNEYS:  Symmetric and normal size  Right kidney:  9 6 x 3 6 x 4 9 cm  Volume 88 3 mL  Left kidney:  11 1 x 5 4 x 4 9 cm  Volume 151 4 mL     Right kidney  Normal echogenicity and contour  No mass is identified  No hydronephrosis  No shadowing calculi  No perinephric fluid collections      Left kidney  Normal echogenicity and contour  No mass is identified  Mild hydronephrosis  No shadowing calculi  No perinephric fluid collections      URETERS:  Nonvisualized      BLADDER:   Normally distended  There is a 2 mm left UVJ stone visualized  Bilateral ureteral jets detected      IMPRESSION:     Mild left-sided hydronephrosis with a 2 mm stone in the left UVJ            ABDOMEN     INDICATION:   kidney stones      COMPARISON:  CT abdomen and pelvis May 30, 2022     VIEWS:  AP supine        FINDINGS:     There is a nonobstructive bowel gas pattern      No discernible free air on this supine study  Upright or left lateral decubitus imaging is more sensitive to detect subtle free air in the appropriate setting      Left renal calculi are demonstrated  Right renal bed is obscured by colonic contents  Proximal left ureteral calculus is not visualized    3 mm distal left ureteral calculus is seen as well as pelvic phleboliths and IUD  Visualized lung bases are clear      Visualized osseous structures are unremarkable for the patient's age      IMPRESSION:     Left renal calculi and distal left ureter calculus  Thank you for allowing me to participate in this patients care  Please do not hesitate to call with any additional questions      BAYRON Escobar

## 2022-06-02 NOTE — ANESTHESIA PREPROCEDURE EVALUATION
Procedure:  CYSTOSCOPY URETEROSCOPY WITH LITHOTRIPSY HOLMIUM LASER, RETROGRADE PYELOGRAM AND INSERTION STENT URETERAL (Left Bladder)    PONV with previous anesthesia  Scopolamine helped in the past    Actively nauseous today despite antiemtics    Relevant Problems   CARDIO   (+) Chronic migraine without aura with status migrainosus, not intractable   (+) Migraine   (+) Mixed hyperlipidemia      ENDO   (+) Acquired hypothyroidism      GI/HEPATIC   (+) Dysphagia      /RENAL   (+) Renal calculi      MUSCULOSKELETAL   (+) Back pain of lumbar region with sciatica   (+) Cervical myofascial pain syndrome   (+) Other idiopathic scoliosis, thoracolumbar region      NEURO/PSYCH   (+) Anxiety   (+) Cervical myofascial pain syndrome   (+) Cervicogenic headache   (+) Chronic migraine without aura with status migrainosus, not intractable   (+) Chronic pain disorder   (+) Migraine   (+) Paresthesias        Physical Exam    Airway    Mallampati score: II  TM Distance: >3 FB  Neck ROM: full     Dental   No notable dental hx     Cardiovascular  Rhythm: regular, Rate: normal, Cardiovascular exam normal    Pulmonary  Pulmonary exam normal Breath sounds clear to auscultation,     Other Findings        Anesthesia Plan  ASA Score- 2     Anesthesia Type- general with ASA Monitors  Additional Monitors:   Airway Plan: LMA  Comment: Risks/benefits and alternatives discussed with patient including likely possibility of PONV and sore throat, as well as the rare possibilities of aspiration, dental/oropharyngeal/ocular injuries, or grave/life threatening anesthetic and surgical emergencies          Plan Factors-Exercise tolerance (METS): >4 METS  Patient summary reviewed  Patient instructed to abstain from smoking on day of procedure  Patient did not smoke on day of surgery  Induction- intravenous  Postoperative Plan- Plan for postoperative opioid use   Planned trial extubation    Informed Consent- Anesthetic plan and risks discussed with patient  I personally reviewed this patient with the CRNA  Discussed and agreed on the Anesthesia Plan with the ANTELMO Jules

## 2022-06-02 NOTE — PROGRESS NOTES
[facility]  Progress Note - Maria Isabel Goodman 1990, 28 y o  female MRN: [id number]  Unit/Bed#: S -01 Encounter: [id number]  Primary Care Provider: Harjeet Mcgowan PA-C   Date and time admitted to hospital: 6/1/2022  7:13 PM    * Ureterolithiasis with left mild hydronephroureterosis  Assessment & Plan   Presentation: Patient returns to the ED due to complaints of continued left flank pain  Patient originally presented to the ED 2 days ago due to complaints of abrupt onset of left lower abdominal pain that radiated to the left flank region with associated nausea and vomiting  Patient was diagnosed with ureteral stones at that time and discharged home with prescriptions for Motrin, Percocet and Zofran  Patient returned to the ED this evening due to complaints of continued left flank pain, nausea, low grade fevers and urinary urgency and frequency   CT from 05/30/2022: 2 mm proximal and 3 mm distal left ureteral stones with mild hydronephroureterosis  No perinephric collection   UA: Moderate blood   IV Hydration   Pain control   IV antiemetics   Flomax   Strain all urine   NPO  [facility]  Systemic lupus erythematosus ([facility] [address] )  Assessment & Plan  · Patient known to Dr Ashanti Woods of Rheumatology  · Continue Azathioprine  Acquired hypothyroidism  Assessment & Plan  · Continue levothyroxine  Chronic migraine without aura with status migrainosus, not intractable  Assessment & Plan  · Continue scheduled Topamax, Lamictal and Mobic  · Continue Valium and Compazine prn  · Patient known to [facility] as an outpatient  Bipolar 1 disorder, mixed (HCC)  Assessment & Plan  · Continue Prozac, Topamax and Wellbutrin  VTE Pharmacologic Prophylaxis: VTE Score: 1 Low Risk (Score 0-2) - Encourage Ambulation  Patient Centered Rounds: I performed bedside rounds with nursing staff today    Discussions with Specialists or Other Care Team Provider: Urology    Education and Discussions with Family / Patient: Patient declined call to   Current Length of Stay: 0 day(s)  Current Patient Status: Observation   Discharge Plan: Anticipate discharge tomorrow to home  Code Status: Level 1 - Full Code    Subjective:   No acute events overnight  Patient no acute distress  Sitting in bed conversing normally  Discussed pending neurological evaluation with possibility of lithotripsy and stent placement per Urology  Patient understanding of and agreeable to plan  No further questions at this time  Objective:     Vitals:   Temp (24hrs), Av 2 °F (36 8 °C), Min:97 5 °F (36 4 °C), Max:99 2 °F (37 3 °C)    Temp:  [97 5 °F (36 4 °C)-99 2 °F (37 3 °C)] 97 8 °F (36 6 °C)  HR:  [] 85  Resp:  [17-18] 18  BP: ()/(56-85) 96/65  SpO2:  [95 %-100 %] 98 %  Body mass index is 25 62 kg/m²  Input and Output Summary (last 24 hours): Intake/Output Summary (Last 24 hours) at 2022 0817  Last data filed at 2022 0616  Gross per 24 hour   Intake 1000 ml   Output 350 ml   Net 650 ml       Physical Exam:   Physical Exam  Vitals and nursing note reviewed  Constitutional:       General: She is not in acute distress  Appearance: She is not diaphoretic  HENT:      Head: Normocephalic  Nose: Nose normal       Mouth/Throat:      Pharynx: Oropharynx is clear  Eyes:      General: No scleral icterus  Conjunctiva/sclera: Conjunctivae normal    Cardiovascular:      Rate and Rhythm: Normal rate and regular rhythm  Pulses: Normal pulses  Heart sounds: Normal heart sounds  No murmur heard  Pulmonary:      Effort: Pulmonary effort is normal  No respiratory distress  Breath sounds: Normal breath sounds  No wheezing, rhonchi or rales  Chest:      Chest wall: No tenderness  Abdominal:      General: Bowel sounds are normal  There is no distension  Palpations: Abdomen is soft  Tenderness:  There is left CVA tenderness  There is no right CVA tenderness  Musculoskeletal:         General: Normal range of motion  Cervical back: Normal range of motion  Skin:     General: Skin is warm and dry  Neurological:      Mental Status: She is alert and oriented to person, place, and time     Psychiatric:         Speech: Speech normal           Additional Data:     Labs:  Results from last 7 days   Lab Units 06/02/22  0627   WBC Thousand/uL 7 38   HEMOGLOBIN g/dL 11 2*   HEMATOCRIT % 33 4*   PLATELETS Thousands/uL 222   NEUTROS PCT % 67   LYMPHS PCT % 23   MONOS PCT % 7   EOS PCT % 2     Results from last 7 days   Lab Units 06/02/22  0627 06/01/22  1956   SODIUM mmol/L 139 140   POTASSIUM mmol/L 4 0 3 8   CHLORIDE mmol/L 113* 109*   CO2 mmol/L 21 24   BUN mg/dL 16 15   CREATININE mg/dL 0 99 1 07   ANION GAP mmol/L 5 7   CALCIUM mg/dL 8 1* 9 2   ALBUMIN g/dL  --  3 9   TOTAL BILIRUBIN mg/dL  --  0 36   ALK PHOS U/L  --  48   ALT U/L  --  7   AST U/L  --  11*   GLUCOSE RANDOM mg/dL 83 92                 Results from last 7 days   Lab Units 06/01/22  2032   LACTIC ACID mmol/L 0 7       Lines/Drains:  Invasive Devices  Report    Peripheral Intravenous Line  Duration           Peripheral IV 06/01/22 Right Antecubital <1 day                      Imaging: Reviewed radiology reports from this admission including: abdominal/pelvic CT    Recent Cultures (last 7 days):         Last 24 Hours Medication List:   Current Facility-Administered Medications   Medication Dose Route Frequency Provider Last Rate    acetaminophen  650 mg Oral Q6H PRN BAYRON Meyer      amphetamine-dextroamphetamine  20 mg Oral Daily BAYRON Meyer      azaTHIOprine  50 mg Oral BID BAYRON Meyer      buPROPion  150 mg Oral BID BAYRON Meyer      diazepam  5 mg Oral Q6H PRN BAYRON Meyer      docusate sodium  100 mg Oral BID BAYRON Meyer      famotidine  20 mg Oral Daily BAYRON Meyer      FLUoxetine  20 mg Oral HS James Rack, CRNP      HYDROmorphone  0 5 mg Intravenous Q4H PRN James Rack, CRNP      HYDROmorphone  0 5 mg Intravenous Q4H PRN James Rack, CRNP      HYDROmorphone  0 2 mg Intravenous Q4H PRN [place] Rack, CRNP      hydroxychloroquine  300 mg Oral Daily James Rack, CRNP      lamoTRIgine  150 mg Oral Daily [place] Rack, CRNP      levothyroxine  100 mcg Oral Daily [place] Rack, CRNP      meloxicam  15 mg Oral Daily [place] Rack, CRNP      metoclopramide  10 mg Intravenous Q6H PRN James Rack, CRNP      prochlorperazine  10 mg Oral Q6H PRN James Rack, CRNP      sodium chloride  125 mL/hr Intravenous Continuous [place] Rack, CRNP 125 mL/hr (06/02/22 0018)    tamsulosin  0 4 mg Oral Daily With [employer], CRNP      topiramate  100 mg Oral BID James Rack, CRNP          Today, Patient Was Seen By: Rae Ganser, MD    **Please Note: This note may have been constructed using a voice recognition system  **

## 2022-06-02 NOTE — ASSESSMENT & PLAN NOTE
 Presentation: Patient returns to the ED due to complaints of continued left flank pain  Patient originally presented to the ED 2 days ago due to complaints of abrupt onset of left lower abdominal pain that radiated to the left flank region with associated nausea and vomiting  Patient was diagnosed with ureteral stones at that time and discharged home with prescriptions for Motrin, Percocet and Zofran  Patient returned to the ED this evening due to complaints of continued left flank pain, nausea, low grade fevers and urinary urgency and frequency   CT from 05/30/2022: 2 mm proximal and 3 mm distal left ureteral stones with mild hydronephroureterosis  No perinephric collection   UA: Moderate blood   IV Hydration   Pain control   IV antiemetics   Flomax   Strain all urine   NPO  [facility]

## 2022-06-02 NOTE — TELEPHONE ENCOUNTER
Patient had small distal left ureteral stone for which she was admitted but had persistent pain so taking to the operating room the following day but no stone found in the ureter  Her kidney had a lot of organized clot which made it hard look for a small renal stone which was also seen on CT scan  Stent left with string  Plan for stent removal in 4 days      Plan for KUB and ultrasound to follow 4-6 weeks later

## 2022-06-02 NOTE — ASSESSMENT & PLAN NOTE
 Presentation: Patient returns to the ED due to complaints of continued left flank pain  Patient originally presented to the ED 2 days ago due to complaints of abrupt onset of left lower abdominal pain that radiated to the left flank region with associated nausea and vomiting  Patient was diagnosed with ureteral stones at that time and discharged home with prescriptions for Motrin, Percocet and Zofran  Patient returned to the ED this evening due to complaints of continued left flank pain, nausea, low grade fevers and urinary urgency and frequency   CT from 05/30/2022: 2 mm proximal and 3 mm distal left ureteral stones with mild hydronephroureterosis  No perinephric collection     UA: Moderate blood   Patient is now s/p cystoscopy, ureteroscopy, retrograde pyelogram and insertion of left ureteral stent    Plan   Continue pain control regimen pending outpatient follow up for stent removal   Levaquin 500 mg daily for 3 days   Oxybutynin 5 mg PRN for 3 days

## 2022-06-02 NOTE — ED PROVIDER NOTES
History  Chief Complaint   Patient presents with    Back Pain     Pt with left back pain, uti symptoms, recent kidney stones dx,  nausea, vomiting and low grade fever  Pt reports stones have not passed  Patient is a 63-year-old female presenting to the emergency room for evaluation of left-sided back and flank pain  Patient was seen here on Monday and was diagnosed with 2 mm proximal and 3 mm distal left ureteral calculi with mild upstream hydronephroureterosis  Patient was subsequently discharged home  Patient states today she developed low-grade fevers, urinary symptoms, and nausea  Patient does not believe she passed the stones  Patient here for further evaluation  History provided by:  Patient   used: No        Prior to Admission Medications   Prescriptions Last Dose Informant Patient Reported? Taking?    FLUoxetine (PROzac) 10 mg capsule 6/1/2022 at Unknown time Self Yes Yes   Sig: Take 20 mg by mouth daily at bedtime   Ibuprofen 200 MG CAPS Not Taking at Unknown time Self Yes No   Sig: Take 800 mg by mouth every 6 (six) hours as needed   Patient not taking: Reported on 6/1/2022   OLANZapine-FLUoxetine (SYMBYAX) 3-25 MG per capsule Not Taking at Unknown time Self Yes No   Sig: Take 1 capsule by mouth every evening   Patient not taking: Reported on 6/1/2022   Plecanatide (Trulance) 3 MG TABS Not Taking at Unknown time  No No   Sig: Take 1 tablet by mouth daily   Patient not taking: No sig reported   Rimegepant Sulfate (Nurtec) 75 MG TBDP Past Month at Unknown time  No Yes   Sig: Take 75 mg by mouth as needed (migraine)   Rimegepant Sulfate (Nurtec) 75 MG TBDP Not Taking at Unknown time  No No   Sig: Take 75 mg by mouth every other day   Patient not taking: No sig reported   SUMAtriptan (IMITREX) 100 mg tablet 6/1/2022 at Unknown time  No Yes   Sig: Take 1 tablet (100 mg total) by mouth as needed for migraine (migraine)   Ubrogepant (UBRELVY) 100 MG tablet Past Month at Unknown time  No Yes   Sig: Take 1 tablet (100 mg) one time as needed for migraine  May repeat one additional tablet (100 mg) at least two hours after the first dose  Do not use more than two doses per day, or for more than 10 days per month     amphetamine-dextroamphetamine (ADDERALL) 20 mg tablet 2022 at Unknown time Self Yes Yes   Sig: Take 20 mg by mouth daily    azaTHIOprine (IMURAN) 50 mg tablet 2022 at Unknown time  No Yes   Sig: Take 1 tablet (50 mg total) by mouth 2 (two) times a day   azelastine (ASTELIN) 0 1 % nasal spray Not Taking at Unknown time  No No   Si sprays into each nostril 2 (two) times a day Use in each nostril as directed   Patient not taking: Reported on 2022   buPROPion (WELLBUTRIN SR) 150 mg 12 hr tablet 2022 at Unknown time Self Yes Yes   Sig: Take 150 mg by mouth 2 (two) times a day    cyclobenzaprine (FLEXERIL) 5 mg tablet Not Taking at Unknown time Self No No   Sig: Take 2 tablets (10 mg total) by mouth daily at bedtime   Patient not taking: Reported on 2022   dexamethasone (DECADRON) 1 mg tablet Not Taking at Unknown time  No No   Sig: Take 1 tablet (1 mg total) by mouth daily with breakfast   Patient not taking: Reported on 2022   dexamethasone (DECADRON) 1 mg tablet Past Month at Unknown time  No Yes   Sig: Take 1 tablet (1 mg total) by mouth as needed (migraine)   diazepam (VALIUM) 5 mg tablet Past Month at Unknown time Self Yes Yes   Sig: Take 5 mg by mouth every 6 (six) hours as needed    famotidine (PEPCID) 20 mg tablet 2022 at Unknown time  No Yes   Sig: Take 1 tablet (20 mg total) by mouth 2 (two) times a day   Patient taking differently: Take 20 mg by mouth daily   fexofenadine (ALLEGRA) 180 MG tablet Not Taking at Unknown time Self Yes No   Sig: Take 180 mg by mouth daily   Patient not taking: No sig reported   hydroxychloroquine (PLAQUENIL) 200 mg tablet 2022 at Unknown time  No Yes   Sig: Take 1 5 tablets (300 mg total) by mouth daily ibuprofen (MOTRIN) 600 mg tablet 2022 at Unknown time  No Yes   Sig: Take 1 tablet (600 mg total) by mouth every 6 (six) hours as needed for mild pain or moderate pain   ketorolac (TORADOL) 10 mg tablet Past Month at Unknown time  No Yes   Sig: Take 1 tablet (10 mg total) by mouth every 6 (six) hours as needed for moderate pain   lamoTRIgine (LaMICtal) 150 MG tablet 2022 at Unknown time  Yes Yes   Sig: Take 150 mg by mouth daily    levonorgestrel (KYLEENA) 19 5 MG intrauterine device  Self Yes No   Si Intra Uterine Device by Intrauterine route once   levothyroxine 100 mcg tablet 2022 at Unknown time  No Yes   Sig: TAKE ONE TABLET BY MOUTH ONCE DAILY   meloxicam (Mobic) 15 mg tablet 2022 at Unknown time  No Yes   Sig: Take 1 tablet (15 mg total) by mouth daily   meloxicam (Mobic) 15 mg tablet Not Taking at Unknown time  No No   Sig: Take 1 tablet (15 mg total) by mouth daily   Patient not taking: Reported on 2022   metroNIDAZOLE (METROGEL) 0 75 % vaginal gel Not Taking at Unknown time  No No   Sig: Insert 1 application into the vagina 2 (two) times a week   Patient not taking: Reported on 2022   mupirocin (BACTROBAN) 2 % ointment Past Month at Unknown time  No Yes   Sig: Apply topically 3 (three) times a day   Patient taking differently: Apply 1 application topically 3 (three) times a day as needed   nitrofurantoin (MACROBID) 100 mg capsule Not Taking at Unknown time Self No No   Sig: Take 1 capsule (100 mg total) by mouth 2 (two) times a day   Patient not taking: No sig reported   nitrofurantoin (MACROBID) 100 mg capsule Not Taking at Unknown time  No No   Sig: Take 1 capsule (100 mg total) by mouth 2 (two) times a day   Patient not taking: Reported on 2022   nitrofurantoin (MACRODANTIN) 50 mg capsule Not Taking at Unknown time Self No No   Sig: Take 1 capsule (50 mg total) by mouth daily at bedtime as needed (after intercourse)   Patient not taking: No sig reported   ondansetron (ZOFRAN-ODT) 4 mg disintegrating tablet Past Month at Unknown time  No Yes   Sig: Take 1 tablet (4 mg total) by mouth every 8 (eight) hours as needed for nausea or vomiting for up to 7 days   oxyCODONE-acetaminophen (PERCOCET) 5-325 mg per tablet 2022 at Unknown time  No Yes   Sig: Take 1 tablet by mouth every 6 (six) hours as needed for severe pain for up to 10 days Max Daily Amount: 4 tablets   polyethylene glycol (MIRALAX) 17 g packet Past Month at Unknown time Self Yes Yes   Sig: Take 17 g by mouth daily   predniSONE 2 5 mg tablet Not Taking at Unknown time  No No   Sig: Take 1 tablet (2 5 mg total) by mouth daily   Patient not taking: Reported on 2022   predniSONE 5 mg tablet Not Taking at Unknown time  No No   Si tabs x7 days, then 3 tabs x7 days, then 2 tabs x7 days, then 1 tab x7 days, then stop     Patient not taking: Reported on 2022   prochlorperazine (COMPAZINE) 10 mg tablet Past Month at Unknown time  No Yes   Sig: Take 1 tablet (10 mg total) by mouth every 6 (six) hours as needed (migraine)   tiZANidine (ZANAFLEX) 2 mg tablet Past Month at Unknown time  No Yes   Sig: Take 1 tablet (2 mg total) by mouth every 8 (eight) hours as needed for muscle spasms   topiramate (TOPAMAX) 50 MG tablet   No No   Si tab in the am and 2 tabs at bedtime   Patient taking differently: 100 mg 2 (two) times a day      Facility-Administered Medications: None       Past Medical History:   Diagnosis Date    Anxiety     Depression     Encounter for IUD removal and reinsertion 2019    Lupus ([facility] [address] )     Migraines     Ovarian cyst, right     PONV (postoperative nausea and vomiting)     Stress fracture, right foot, initial encounter for fracture        Past Surgical History:   Procedure Laterality Date    COLONOSCOPY      INGUINAL HERNIA REPAIR  10/1998    REDUCTION MAMMAPLASTY  2013    TONSILLECTOMY  2011    TONSILLECTOMY      UPPER GASTROINTESTINAL ENDOSCOPY      WISDOM TOOTH EXTRACTION Family History   Problem Relation Age of Onset    Colon cancer Mother     Arthritis Mother     Thyroid disease Mother     Depression Mother     Anxiety disorder Mother     Clotting disorder Mother     Hyperlipidemia Mother     Vesicoureteral reflux Mother     Irritable bowel syndrome Mother     Migraines Mother     Skin cancer Mother     Thyroid disease unspecified Mother     Heart disease Father     Hypertension Father     Arthritis Father     Hyperlipidemia Father     Vesicoureteral reflux Father     Hypertension Brother     Hyperlipidemia Brother     COPD Maternal Grandmother     Stroke Maternal Grandmother     Lung cancer Maternal Grandmother     Arthritis Maternal Grandmother     Asthma Maternal Grandmother     Hyperlipidemia Maternal Grandmother     Vesicoureteral reflux Maternal Grandmother     Heart disease Maternal Grandmother     Hypertension Maternal Grandmother     Migraines Maternal Grandmother     Osteoporosis Maternal Grandmother     Heart attack Maternal Grandfather     Heart disease Maternal Grandfather     Diabetes Maternal Grandfather     Heart disease Paternal Grandmother     Hyperlipidemia Paternal Grandmother     Diabetes Paternal Grandfather     Kidney disease Maternal Aunt     Migraines Maternal Aunt     Heart attack Maternal Uncle     Heart disease Maternal Uncle     Melanoma Maternal Uncle     Lupus Cousin      I have reviewed and agree with the history as documented  E-Cigarette/Vaping    E-Cigarette Use Never User      E-Cigarette/Vaping Substances    Nicotine No     THC No     CBD No     Flavoring No     Other No     Unknown No      Social History     Tobacco Use    Smoking status: Never Smoker    Smokeless tobacco: Never Used   Vaping Use    Vaping Use: Never used   Substance Use Topics    Alcohol use: Yes     Comment: social    Drug use: Never       Review of Systems   Constitutional: Positive for fever   Negative for chills  HENT: Negative for ear pain and sore throat  Eyes: Negative for pain and visual disturbance  Respiratory: Negative for cough and shortness of breath  Cardiovascular: Negative for chest pain and palpitations  Gastrointestinal: Positive for nausea  Negative for abdominal pain and vomiting  Genitourinary: Positive for dysuria and flank pain  Negative for hematuria  Musculoskeletal: Negative for arthralgias and back pain  Skin: Negative for color change and rash  Neurological: Negative for seizures and syncope  All other systems reviewed and are negative  Physical Exam  Physical Exam  Vitals and nursing note reviewed  Constitutional:       General: She is not in acute distress  Appearance: She is well-developed  HENT:      Head: Normocephalic and atraumatic  Eyes:      Conjunctiva/sclera: Conjunctivae normal    Cardiovascular:      Rate and Rhythm: Normal rate and regular rhythm  Heart sounds: No murmur heard  Pulmonary:      Effort: Pulmonary effort is normal  No respiratory distress  Breath sounds: Normal breath sounds  Abdominal:      Palpations: Abdomen is soft  Tenderness: There is abdominal tenderness (left flank)  There is left CVA tenderness  Musculoskeletal:      Cervical back: Neck supple  Skin:     General: Skin is warm and dry  Neurological:      Mental Status: She is alert           Vital Signs  ED Triage Vitals   Temperature Pulse Respirations Blood Pressure SpO2   06/01/22 1753 06/01/22 1753 06/01/22 1753 06/01/22 1753 06/01/22 1753   99 2 °F (37 3 °C) (!) 112 18 132/56 99 %      Temp Source Heart Rate Source Patient Position - Orthostatic VS BP Location FiO2 (%)   06/01/22 1753 06/01/22 1753 06/01/22 2033 06/01/22 1753 --   Oral Monitor Lying Left arm       Pain Score       06/01/22 2033       5           Vitals:    06/01/22 1753 06/01/22 2033 06/01/22 2313   BP: 132/56 111/73 114/75   Pulse: (!) 112 85 88   Patient Position - Orthostatic VS:  Lying Lying         Visual Acuity  Visual Acuity    Flowsheet Row Most Recent Value   L Pupil Size (mm) 3   R Pupil Size (mm) 3          ED Medications  Medications   sodium chloride 0 9 % infusion (has no administration in time range)   acetaminophen (TYLENOL) tablet 650 mg (has no administration in time range)   ketorolac (TORADOL) injection 15 mg (15 mg Intravenous Given 6/1/22 2034)   tamsulosin (FLOMAX) capsule 0 4 mg (0 4 mg Oral Given 6/1/22 2034)   sodium chloride 0 9 % bolus 1,000 mL (0 mL Intravenous Stopped 6/1/22 2133)   ondansetron (ZOFRAN) injection 4 mg (4 mg Intravenous Given 6/1/22 2034)       Diagnostic Studies  Results Reviewed     Procedure Component Value Units Date/Time    Lactic acid, plasma [[id number]]  (Normal) Collected: 06/01/22 2032    Lab Status: Final result Specimen: Blood from Arm, Right Updated: 06/01/22 2112     LACTIC ACID 0 7 mmol/L     Narrative:      Result may be elevated if tourniquet was used during collection      Lipase [[id number]]  (Normal) Collected: 06/01/22 1956    Lab Status: Final result Specimen: Blood from Arm, Right Updated: 06/01/22 2026     Lipase 21 u/L     Comprehensive metabolic panel [[id number]]  (Abnormal) Collected: 06/01/22 1956    Lab Status: Final result Specimen: Blood from Arm, Right Updated: 06/01/22 2026     Sodium 140 mmol/L      Potassium 3 8 mmol/L      Chloride 109 mmol/L      CO2 24 mmol/L      ANION GAP 7 mmol/L      BUN 15 mg/dL      Creatinine 1 07 mg/dL      Glucose 92 mg/dL      Calcium 9 2 mg/dL      AST 11 U/L      ALT 7 U/L      Alkaline Phosphatase 48 U/L      Total Protein 6 5 g/dL      Albumin 3 9 g/dL      Total Bilirubin 0 36 mg/dL      eGFR 68 ml/min/1 73sq m     Narrative:      [place] guidelines for Chronic Kidney Disease (CKD):     Stage 1 with normal or high GFR (GFR > 90 mL/min/1 73 square meters)    Stage 2 Mild CKD (GFR = 60-89 mL/min/1 73 square meters)    Stage 3A Moderate CKD (GFR = 45-59 mL/min/1 73 square meters)    Stage 3B Moderate CKD (GFR = 30-44 mL/min/1 73 square meters)    Stage 4 Severe CKD (GFR = 15-29 mL/min/1 73 square meters)    Stage 5 End Stage CKD (GFR <15 mL/min/1 73 square meters)  Note: GFR calculation is accurate only with a steady state creatinine    Urine Microscopic [[id number]]  (Normal) Collected: 06/01/22 1936    Lab Status: Final result Specimen: Urine, Clean Catch Updated: 06/01/22 2016     RBC, UA 1-2 /hpf      WBC, UA 1-2 /hpf      Epithelial Cells Occasional /hpf      Bacteria, UA Occasional /hpf     CBC and differential [[id number]] Collected: 06/01/22 1956    Lab Status: Final result Specimen: Blood from Arm, Right Updated: 06/01/22 2008     WBC 5 08 Thousand/uL      RBC 3 89 Million/uL      Hemoglobin 12 9 g/dL      Hematocrit 36 5 %      MCV 94 fL      MCH 33 2 pg      MCHC 35 3 g/dL      RDW 12 4 %      MPV 11 7 fL      Platelets 927 Thousands/uL      nRBC 0 /100 WBCs      Neutrophils Relative 57 %      Immat GRANS % 1 %      Lymphocytes Relative 30 %      Monocytes Relative 7 %      Eosinophils Relative 4 %      Basophils Relative 1 %      Neutrophils Absolute 2 98 Thousands/µL      Immature Grans Absolute 0 03 Thousand/uL      Lymphocytes Absolute 1 51 Thousands/µL      Monocytes Absolute 0 34 Thousand/µL      Eosinophils Absolute 0 18 Thousand/µL      Basophils Absolute 0 04 Thousands/µL     UA w Reflex to Microscopic w Reflex to Culture [[id number]]  (Abnormal) Collected: 06/01/22 1936    Lab Status: Final result Specimen: Urine, Clean Catch Updated: 06/01/22 2002     Color, UA Yellow     Clarity, UA Clear     Specific Gravity, UA 1 015     pH, UA 8 0     Leukocytes, UA Negative     Nitrite, UA Negative     Protein, UA Negative mg/dl      Glucose, UA Negative mg/dl      Ketones, UA Negative mg/dl      Urobilinogen, UA 0 2 E U /dl      Bilirubin, UA Negative     Blood, UA Moderate    POCT pregnancy, urine [[id number]]  (Normal) Resulted: 06/01/22 1939    Lab Status: Final result Specimen: Urine Updated: 06/01/22 1939     EXT PREG TEST UR (Ref: Negative) Negative     Control Valid                 US kidney and bladder   Final Result by Radhika Flood DO (06/01 2325)      Mild left-sided hydronephrosis with a 2 mm stone in the left UVJ  Workstation performed: [id number]         XR abdomen 1 view kub   ED Interpretation by Radha Tran PA-C (06/01 2127)   No acute findings                    ED Course  ED Course as of 06/01/22 2328 Wed Jun 01, 2022 2225 Discussed case with urology on call  They state to "admit to medicine  Please do US kidney and bladder and we will see tomorrow  Keep NPO after midnight  Start Tamsulosin if it hasn't been started already"             MDM  Number of Diagnoses or Management Options  Renal colic: established and worsening  Ureterolithiasis: established and worsening     Amount and/or Complexity of Data Reviewed  Clinical lab tests: ordered and reviewed  Tests in the radiology section of CPT®: ordered and reviewed    Patient Progress  Patient progress: stable      Disposition  Final diagnoses:   Ureterolithiasis   Renal colic     Time reflects when diagnosis was documented in both MDM as applicable and the Disposition within this note     Time User Action Codes Description Comment    6/1/2022 10:05 PM Sharyon Brown Add [N20 1] Ureterolithiasis     6/1/2022 10:05 PM Sharyon Brown Add [C36] Renal colic     5/8/9814 54:43 PM Wyona Beam Add [N20 1] Ureterolithiasis with left mild hydronephroureterosis       ED Disposition     ED Disposition   Admit    Condition   Stable    Date/Time   Wed Jun 1, 2022 10:08 PM    Comment   Case was discussed with [facility]/urology and the patient's admission status was agreed to be Admission Status: observation status to the service of Dr Rodolfo Alcantar              Follow-up Information    None         Patient's Medications   Discharge Prescriptions    No medications on file No discharge procedures on file      PDMP Review     None          ED Provider  Electronically Signed by           Garrick Hutchinson PA-C  06/01/22 4509

## 2022-06-02 NOTE — ASSESSMENT & PLAN NOTE
 Presentation: Patient returns to the ED due to complaints of continued left flank pain  Patient originally presented to the ED 2 days ago due to complaints of abrupt onset of left lower abdominal pain that radiated to the left flank region with associated nausea and vomiting  Patient was diagnosed with ureteral stones at that time and discharged home with prescriptions for Motrin, Percocet and Zofran  Patient returned to the ED this evening due to complaints of continued left flank pain, nausea, low grade fevers and urinary urgency and frequency   CT from 05/30/2022: 2 mm proximal and 3 mm distal left ureteral stones with mild hydronephroureterosis  No perinephric collection   UA: Moderate blood   IV Hydration   Pain control   IV antiemetics   Flomax   Strain all urine   NPO  [facility]

## 2022-06-02 NOTE — ASSESSMENT & PLAN NOTE
· Continue scheduled Topamax, Lamictal and Mobic  · Continue Valium and Compazine prn  · Patient known to [facility] as an outpatient

## 2022-06-02 NOTE — H&P
[facility]  H&P- Maria Isabel Goodman 1990, 28 y o  female MRN: [id number]  Unit/Bed#: S -01 Encounter: [id number]  Primary Care Provider: Anastacio Anton PA-C   Date and time admitted to hospital: 6/1/2022  7:13 PM    * Ureterolithiasis with left mild hydronephroureterosis  Assessment & Plan   Presentation: Patient returns to the ED due to complaints of continued left flank pain  Patient originally presented to the ED 2 days ago due to complaints of abrupt onset of left lower abdominal pain that radiated to the left flank region with associated nausea and vomiting  Patient was diagnosed with ureteral stones at that time and discharged home with prescriptions for Motrin, Percocet and Zofran  Patient returned to the ED this evening due to complaints of continued left flank pain, nausea, low grade fevers and urinary urgency and frequency   CT from 05/30/2022: 2 mm proximal and 3 mm distal left ureteral stones with mild hydronephroureterosis  No perinephric collection   UA: Moderate blood   IV Hydration   Pain control   IV antiemetics   Flomax   Strain all urine   NPO  [facility]  Systemic lupus erythematosus ([facility] [address] )  Assessment & Plan  · Patient known to Dr Pam Quiros of Rheumatology  · Continue Azathioprine  Acquired hypothyroidism  Assessment & Plan  · Continue levothyroxine  Chronic migraine without aura with status migrainosus, not intractable  Assessment & Plan  · Continue scheduled Topamax, Lamictal and Mobic  · Continue Valium and Compazine prn  · Patient known to [facility] as an outpatient  Bipolar 1 disorder, mixed (HCC)  Assessment & Plan  · Continue Prozac, Topamax and Wellbutrin  VTE Pharmacologic Prophylaxis: VTE Score: 1 Low Risk (Score 0-2) - Encourage Ambulation  Code Status: Level 1 - Full Code   Discussion with family: Updated  () at bedside      Anticipated Length of Stay: Patient will be admitted on an observation basis with an anticipated length of stay of less than 2 midnights secondary to intractable flank pain  Total Time for Visit, including Counseling / Coordination of Care: 70 minutes Greater than 50% of this total time spent on direct patient counseling and coordination of care  Chief Complaint: Left flank pain    History of Present Illness:  Sarah Oconnor is a 28 y o  female with a PMH of Bipolar 1 disorder, anxiety, systemic lupus erythematosus, chronic migraines and hypothyroidism who returns to the ED due to complaints of continued left flank pain  Patient originally presented to the ED 2 days ago due to complaints of abrupt onset of left lower abdominal pain that radiated to the left flank region with associated nausea and vomiting  Patient was diagnosed with ureteral stones at that time and discharged home with prescriptions for Motrin, Percocet and Zofran  Patient returned to the ED this evening due to complaints of continued left flank pain, nausea, low grade fevers and urinary urgency and frequency  Patient endorses taking Tylenol and Motrin around the clock with occasional doses of Percocet  She reports a T-max of 100 5° F  Patient will be admitted for pain control, IV hydration and urology consult  Review of Systems:  Review of Systems   Constitutional: Positive for appetite change and fever  Negative for chills  Gastrointestinal: Positive for abdominal pain, nausea and vomiting  Genitourinary: Positive for flank pain, frequency and urgency  Musculoskeletal: Positive for back pain  All other systems reviewed and are negative        Past Medical and Surgical History:   Past Medical History:   Diagnosis Date    Anxiety     Depression     Encounter for IUD removal and reinsertion 5/29/2019    Lupus ([facility] [address] [facility]     Migraines     Ovarian cyst, right     PONV (postoperative nausea and vomiting)     Stress fracture, right foot, initial encounter for fracture        Past Surgical History:   Procedure Laterality Date    COLONOSCOPY      INGUINAL HERNIA REPAIR  10/1998    REDUCTION MAMMAPLASTY  03/2013    TONSILLECTOMY  06/2011    TONSILLECTOMY      UPPER GASTROINTESTINAL ENDOSCOPY      WISDOM TOOTH EXTRACTION         Meds/Allergies:  Prior to Admission medications    Medication Sig Start Date End Date Taking?  Authorizing Provider   amphetamine-dextroamphetamine (ADDERALL) 20 mg tablet Take 20 mg by mouth daily  11/5/19  Yes Historical Provider, MD   azaTHIOprine (IMURAN) 50 mg tablet Take 1 tablet (50 mg total) by mouth 2 (two) times a day 3/2/22  Yes Leila Moreno MD   buPROPion (WELLBUTRIN SR) 150 mg 12 hr tablet Take 150 mg by mouth 2 (two) times a day  4/8/19  Yes Historical Provider, MD   dexamethasone (DECADRON) 1 mg tablet Take 1 tablet (1 mg total) by mouth as needed (migraine) 5/5/22  Yes Zach Paris PA-C   diazepam (VALIUM) 5 mg tablet Take 5 mg by mouth every 6 (six) hours as needed  4/20/19  Yes Historical Provider, MD   famotidine (PEPCID) 20 mg tablet Take 1 tablet (20 mg total) by mouth 2 (two) times a day  Patient taking differently: Take 20 mg by mouth daily 2/4/22  Yes Leila Moreno MD   FLUoxetine (PROzac) 10 mg capsule Take 20 mg by mouth daily at bedtime 12/23/19  Yes Historical Provider, MD   hydroxychloroquine (PLAQUENIL) 200 mg tablet Take 1 5 tablets (300 mg total) by mouth daily 2/4/22 8/3/22 Yes Leila Moreno MD   ibuprofen (MOTRIN) 600 mg tablet Take 1 tablet (600 mg total) by mouth every 6 (six) hours as needed for mild pain or moderate pain 5/30/22  Yes Janette Gallegos DO   ketorolac (TORADOL) 10 mg tablet Take 1 tablet (10 mg total) by mouth every 6 (six) hours as needed for moderate pain 5/5/22  Yes Zach Paris PA-C   lamoTRIgine (LaMICtal) 150 MG tablet Take 150 mg by mouth daily  4/1/21  Yes Historical Provider, MD   levothyroxine 100 mcg tablet TAKE ONE TABLET BY MOUTH ONCE DAILY 3/25/22  Yes Richie Ledbetter PA-C   meloxicam (Mobic) 15 mg tablet Take 1 tablet (15 mg total) by mouth daily 2/4/22  Yes Leila Moreno MD   mupirocin (BACTROBAN) 2 % ointment Apply topically 3 (three) times a day  Patient taking differently: Apply 1 application topically 3 (three) times a day as needed 1/26/21  Yes Richie Ledbetter PA-C   ondansetron (ZOFRAN-ODT) 4 mg disintegrating tablet Take 1 tablet (4 mg total) by mouth every 8 (eight) hours as needed for nausea or vomiting for up to 7 days 5/30/22 6/6/22 Yes Janette Gallegos,    oxyCODONE-acetaminophen (PERCOCET) 5-325 mg per tablet Take 1 tablet by mouth every 6 (six) hours as needed for severe pain for up to 10 days Max Daily Amount: 4 tablets 5/30/22 6/9/22 Yes Tasia Gallegos DO   polyethylene glycol (MIRALAX) 17 g packet Take 17 g by mouth daily   Yes Hillary Arias MD   prochlorperazine (COMPAZINE) 10 mg tablet Take 1 tablet (10 mg total) by mouth every 6 (six) hours as needed (migraine) 5/5/22  Yes Zach Paris PA-C   Rimegepant Sulfate (Nurtec) 75 MG TBDP Take 75 mg by mouth as needed (migraine) 1/8/21  Yes Zach Paris PA-C   SUMAtriptan (IMITREX) 100 mg tablet Take 1 tablet (100 mg total) by mouth as needed for migraine (migraine) 5/5/22  Yes Zach aPris PA-C   tiZANidine (ZANAFLEX) 2 mg tablet Take 1 tablet (2 mg total) by mouth every 8 (eight) hours as needed for muscle spasms 2/4/22  Yes Leila Moreno MD   Ubrogepant (UBRELVY) 100 MG tablet Take 1 tablet (100 mg) one time as needed for migraine  May repeat one additional tablet (100 mg) at least two hours after the first dose  Do not use more than two doses per day, or for more than 10 days per month   10/1/21  Yes Zach Paris PA-C   cyclobenzaprine (FLEXERIL) 5 mg tablet Take 2 tablets (10 mg total) by mouth daily at bedtime  Patient not taking: Reported on 6/1/2022 1/31/20   Zach Paris PA-C   levonorgestrel [facility]) 19 5 MG intrauterine device 1 Intra Uterine Device by Intrauterine route once 5/29/19   Historical Provider, MD   topiramate (TOPAMAX) 50 MG tablet 1 tab in the am and 2 tabs at bedtime  Patient taking differently: 100 mg 2 (two) times a day 5/5/22   Josemanuel Mcneil PA-C   azelastine (ASTELIN) 0 1 % nasal spray 2 sprays into each nostril 2 (two) times a day Use in each nostril as directed  Patient not taking: Reported on 6/1/2022 8/4/21 6/1/22  Franklyn Muhammad DO   dexamethasone (DECADRON) 1 mg tablet Take 1 tablet (1 mg total) by mouth daily with breakfast  Patient not taking: Reported on 6/1/2022 5/5/22 6/1/22  Josemanuel Mcneil PA-C   fexofenadine (ALLEGRA) 180 MG tablet Take 180 mg by mouth daily  Patient not taking: No sig reported  6/1/22  Historical Provider, MD   Ibuprofen 200 MG CAPS Take 800 mg by mouth every 6 (six) hours as needed  Patient not taking: Reported on 6/1/2022 8/23/06 6/1/22  Historical Provider, MD   meloxicam (Mobic) 15 mg tablet Take 1 tablet (15 mg total) by mouth daily  Patient not taking: Reported on 6/1/2022 4/8/22 6/1/22  Alise Lopez PA-C   metroNIDAZOLE (METROGEL) 0 75 % vaginal gel Insert 1 application into the vagina 2 (two) times a week  Patient not taking: Reported on 6/1/2022 2/7/22 6/1/22  Beny Delcid MD   nitrofurantoin (MACROBID) 100 mg capsule Take 1 capsule (100 mg total) by mouth 2 (two) times a day  Patient not taking: No sig reported 6/4/20 6/1/22  Marlon Benson MD   nitrofurantoin (MACROBID) 100 mg capsule Take 1 capsule (100 mg total) by mouth 2 (two) times a day  Patient not taking: Reported on 6/1/2022 4/8/22 6/1/22  Alise Lopez PA-C   nitrofurantoin (MACRODANTIN) 50 mg capsule Take 1 capsule (50 mg total) by mouth daily at bedtime as needed (after intercourse)  Patient not taking: No sig reported 10/28/20 6/1/22  BAYRON Deras   OLANZapine-FLUoxetine (SYMBYAX) 3-25 MG per capsule Take 1 capsule by mouth every evening  Patient not taking: Reported on 6/1/2022 6/1/22  Historical Provider, MD   Plecanatide (Trulance) 3 MG TABS Take 1 tablet by mouth daily  Patient not taking: No sig reported 4/2/21 6/1/22  Amadou Skelton MD   predniSONE 2 5 mg tablet Take 1 tablet (2 5 mg total) by mouth daily  Patient not taking: Reported on 6/1/2022 10/22/21 6/1/22  Angle Kay MD   predniSONE 5 mg tablet 4 tabs x7 days, then 3 tabs x7 days, then 2 tabs x7 days, then 1 tab x7 days, then stop  Patient not taking: Reported on 6/1/2022 3/2/22 6/1/22  Angle Kay MD   Rimegepant Sulfate (Nurtec) 75 MG TBDP Take 75 mg by mouth every other day  Patient not taking: No sig reported 6/11/21 6/1/22  Amanda Kat PA-C     I have reviewed home medications with patient personally  Allergies:    Allergies   Allergen Reactions    Augmentin [Amoxicillin-Pot Clavulanate] Anaphylaxis    Oxcarbazepine Hives     Hives       Social History:  Marital Status: Single   Occupation: Psychiatric [employer]  Patient Pre-hospital Living Situation: Home  Patient Pre-hospital Level of Mobility: walks  Patient Pre-hospital Diet Restrictions: None  Substance Use History:   Social History     Substance and Sexual Activity   Alcohol Use Yes    Comment: social     Social History     Tobacco Use   Smoking Status Never Smoker   Smokeless Tobacco Never Used     Social History     Substance and Sexual Activity   Drug Use Never       Family History:  Family History   Problem Relation Age of Onset    Colon cancer Mother     Arthritis Mother     Thyroid disease Mother     Depression Mother     Anxiety disorder Mother     Clotting disorder Mother     Hyperlipidemia Mother     Vesicoureteral reflux Mother     Irritable bowel syndrome Mother    [facility] Migraines Mother     Skin cancer Mother     Thyroid disease unspecified Mother     Heart disease Father     Hypertension Father     Arthritis Father     Hyperlipidemia Father     Vesicoureteral reflux Father     Hypertension Brother     Hyperlipidemia Brother     COPD Maternal Grandmother     Stroke Maternal Grandmother     Lung cancer Maternal Grandmother     Arthritis Maternal Grandmother     Asthma Maternal Grandmother     Hyperlipidemia Maternal Grandmother     Vesicoureteral reflux Maternal Grandmother     Heart disease Maternal Grandmother     Hypertension Maternal Grandmother     Migraines Maternal Grandmother     Osteoporosis Maternal Grandmother     Heart attack Maternal Grandfather     Heart disease Maternal Grandfather     Diabetes Maternal Grandfather     Heart disease Paternal Grandmother     Hyperlipidemia Paternal Grandmother     Diabetes Paternal Grandfather     Kidney disease Maternal Aunt     Migraines Maternal Aunt     Heart attack Maternal Uncle     Heart disease Maternal Uncle     Melanoma Maternal Uncle     Lupus Cousin        Physical Exam:     Vitals:   Blood Pressure: 122/85 (06/01/22 2356)  Pulse: 81 (06/01/22 2356)  Temperature: 97 8 °F (36 6 °C) (06/01/22 2356)  Temp Source: Oral (06/01/22 2356)  Respirations: 18 (06/01/22 2356)  Height: 4' 11 75" (151 8 cm) (06/01/22 2313)  Weight - Scale: 59 kg (130 lb 1 1 oz) (06/01/22 2313)  SpO2: 100 % (06/01/22 2313)    Physical Exam  Vitals and nursing note reviewed  Constitutional:       General: She is not in acute distress  Appearance: She is not diaphoretic  HENT:      Head: Normocephalic  Nose: Nose normal       Mouth/Throat:      Pharynx: Oropharynx is clear  Eyes:      General: No scleral icterus  Conjunctiva/sclera: Conjunctivae normal    Cardiovascular:      Rate and Rhythm: Normal rate and regular rhythm  Pulses: Normal pulses  Heart sounds: Normal heart sounds  No murmur heard  Pulmonary:      Effort: Pulmonary effort is normal  No respiratory distress  Breath sounds: Normal breath sounds  No wheezing, rhonchi or rales  Chest:      Chest wall: No tenderness  Abdominal:      General: Bowel sounds are normal  There is no distension  Palpations: Abdomen is soft  Tenderness:  There is left CVA tenderness  There is no right CVA tenderness  Musculoskeletal:         General: Normal range of motion  Cervical back: Normal range of motion  Skin:     General: Skin is warm and dry  Neurological:      Mental Status: She is alert and oriented to person, place, and time  Psychiatric:         Speech: Speech normal           Additional Data:     Lab Results:  Results from last 7 days   Lab Units 06/01/22 1956   WBC Thousand/uL 5 08   HEMOGLOBIN g/dL 12 9   HEMATOCRIT % 36 5   PLATELETS Thousands/uL 268   NEUTROS PCT % 57   LYMPHS PCT % 30   MONOS PCT % 7   EOS PCT % 4     Results from last 7 days   Lab Units 06/01/22 1956   SODIUM mmol/L 140   POTASSIUM mmol/L 3 8   CHLORIDE mmol/L 109*   CO2 mmol/L 24   BUN mg/dL 15   CREATININE mg/dL 1 07   ANION GAP mmol/L 7   CALCIUM mg/dL 9 2   ALBUMIN g/dL 3 9   TOTAL BILIRUBIN mg/dL 0 36   ALK PHOS U/L 48   ALT U/L 7   AST U/L 11*   GLUCOSE RANDOM mg/dL 92                 Results from last 7 days   Lab Units 06/01/22 2032   LACTIC ACID mmol/L 0 7       Imaging: Reviewed radiology reports from this admission including: abdominal/pelvic CT and ultrasound(s)  US kidney and bladder   Final Result by Radhika Flood DO (06/01 2325)      Mild left-sided hydronephrosis with a 2 mm stone in the left UVJ  Workstation performed: [id number]         XR abdomen 1 view kub   ED Interpretation by Radha Tran PA-C (06/01 2127)   No acute findings           EKG and Other Studies Reviewed on Admission:   · EKG: Pending  ** Please Note: This note has been constructed using a voice recognition system   **

## 2022-06-02 NOTE — DISCHARGE INSTRUCTIONS
Maria Isabel Goodman:    We did not find a stone in your ureter which almost guarantees that you passed it  We also looked into the left collecting system (of the left kidney) as recent CT scan showed a small stone in the lower pole but your kidney had a lot of blood clot within it which made it hard to find this small stone  Please remove your stent at home following the directions below on Monday June 6   If you are not comfortable removing your stent please let us knw and we will arrange a clinic visit    Please take the prescribed antibiotic around time of stent pull as directed on bottle  We will then see you back in 6 weeks with an x-ray and ultrasound  Please take your medications as prescribed with caution for comfort  Most importantly please drink 6-8 glasses of water per day    Please call with any questions or concerns  [address] [facility]  [phone]            WHAT IS A STENT? At the end of the procedure, your doctor may place a stent into your ureter  A stent is a thin, flexible piece of plastic that will hold open your ureter while the remaining small pieces of stone pass  This allows your kidney to drain easily and prevents you from having to pass these small stone pieces on your own, which could be painful  The stent is about 12 inches long and looks and feels like a thin piece of spaghetti  AFTER THE PROCEDURE  After the procedure you may experience the following symptoms  All of these are normal and should resolve within 1 or 2 days after your stent is removed    Urinary frequency (urinating more often than usual)  Urinary urgency (the sensation that you need to urinate right away)  Painful urination (this can be pain in your bladder or in your back when  you urinate)  Blood in your urine ( a stent can irritate the lining of your bladder causing it to bleed)  Back/Flank pain, especially with urination    You may receive a prescription for narcotic pain medication after the procedure  You will also receive a prescription for tamsulosin which you will take once a day for 2 weeks to help relax your ureter and decrease stent discomfort  You will also need to purchase a stool softener (i e  Colace) or mild laxative (i e  Miralax) as the narcotic pain medication can make you constipated  This is important as constipation can exacerbate stent related symptoms  STENT REMOVAL  In some cases, your doctor will leave strings attached to your stent  The strings will be taped to your skin after the procedure  The strings will allow you to remove the thin flexible stent while you are at home  Normally, the stent can be removed 3-5 days after your procedure; your physician will tell you the specific date after your procedure  On the day you are supposed to remove your stent, do the following:  When you wake up in the morning, take 1-2 pain pills with food  Start your antibiotic pill the morning of schedule stent removal if prescribed  One hour later sit on the toilet or in the bath tub  Take a deep breath in and while exhaling, pull the string  Dispose of the stent in the garbage  Alternatively, you will come back for an office procedure to remove the stent by placing a small camera into your bladder to remove the stent  During the next 4-8 hours after removing your stent, you may experience additional blood in your urine, pain with urination or back/side pain  You should take the pain medication you were prescribed to help you with the pain, as well as continue the Flomax  If the pain is severe, you are vomiting, and/or have a fever > 101 4 please call the clinic

## 2022-06-02 NOTE — ASSESSMENT & PLAN NOTE
· Continue scheduled Topamax, Lamictal and Mobic  · Continue Valium and Compazine prn  · Patient known to [facility] as an outpatient

## 2022-06-02 NOTE — ASSESSMENT & PLAN NOTE
· Continue scheduled Topamax, Lamictal and Mobic  · Continue Valium and Compazine prn  · Patient known to [facility] as an outpatient

## 2022-06-02 NOTE — UTILIZATION REVIEW
Initial Clinical Review  OBSERVATION  6/1/22 @2208 CONVERTED TO INPATIENT ADMISSION 6/2/22 @1338 DUE TO CONTINUED STAY REQUIRED TO EVALUATE AND TREAT PATIENT WITH OBSTRUCTIVE UROPATHY/ FAILED MEDICAL EXPULSIVE THERAPY WITH SURGICAL INTERVENTION    Admission: Date/Time/Statement:   Admission Orders (From admission, onward)     Ordered        06/02/22 1338  Inpatient Admission  Once            06/01/22 2208  Place in Observation  Once                      Orders Placed This Encounter   Procedures    Inpatient Admission     Standing Status:   Standing     Number of Occurrences:   1     Order Specific Question:   Level of Care     Answer:   Med Surg [16]     Order Specific Question:   Estimated length of stay     Answer:   More than 2 Midnights     Order Specific Question:   Certification     Answer:   I certify that inpatient services are medically necessary for this patient for a duration of greater than two midnights  See H&P and MD Progress Notes for additional information about the patient's course of treatment  ED Arrival Information     Expected   -    Arrival   6/1/2022 17:34    Acuity   Urgent            Means of arrival   Walk-In    Escorted by   Spouse    Service   Hospitalist    Admission type   Urgent            Arrival complaint   [facility] pain/Fever/Nausea           Chief Complaint   Patient presents with    Back Pain     Pt with left back pain, uti symptoms, recent kidney stones dx,  nausea, vomiting and low grade fever  Pt reports stones have not passed  Initial Presentation: 28 y o  female PMH of Bipolar 1 disorder, anxiety, SLE, chronic migraines and hypothyroidism who returns to the ED from home due to complaints of continued left flank pain  Pt originally presented to the ED 2 days ago due to complaints of abrupt onset of left lower abdominal pain that radiated to the left flank region with associated nausea and vomiting   Patient was diagnosed with ureteral stones at that time and discharged home with prescriptions for Motrin, Percocet and Zofran  Pt now returns with continued L flank pain, nausea , fever of 100 5 F, urinary urgency and frequency  Pt using Tylenol and Motrin ATC, prn percocet  On exam, L CVA tenderness, temp 99 2 F  Labs - UA shows moderate blood   US Kidney bladder shows mild left-sided hydronephrosis with a 2 mm stone in the left UVJ  Pt given FLomax, IV analgesic, IVF, IV antiemetic in ED  Pt admitted as OBS with urerolithiasis with L hydronephrosis  Plan - IVF, pain control, urology consult, Flomax, strain all urine , NPO, IV antiemetics prn ,     Date: 6/2 converted to IP  Urology consult-plan for medical expulsive therapy  Pt w/o leukocytosis, renal function stable  Urine testing unremarkable for infection  Strain all urine   NPO  IVF, pain control  Urology update-pt has hx UTI, symptoms somewhat consistent w/ UTI  Plan for OR today, approx 1310  Medicine- pt to OR today, F/U urine culture   Continue IVF       6/2/22 @1451  CYSTOSCOPY,URETEROSCOPY, RETROGRADE PYELOGRAM AND INSERTION STENT URETERAL (Left)   Anesthesia- general  Operative Findings:  Small fragment of stone seen in the bladder  Left intramural ureter edematous  No stone found in ureter with both semi-rigid and flexible ureteroscopy  Collecting system explored but limited visualization because of large organized clot burden carpeting much of surface area  Attempt made to allow clot but not successful  No obvious stone found in collecting system    Date 6/3 Day 2 POD #1  Pt afebrile, WBC WNL ,  reports back pain this am 4/10 , receiving prn Tylenol for pain   BP improved this am, low overnight  Receiving IVF that was stopped this afternoon  Plan for d/c today on 3 days po Levaquin daily,  and 3 days Oxybutynin prn     ED Triage Vitals   Temperature Pulse Respirations Blood Pressure SpO2   06/01/22 1753 06/01/22 1753 06/01/22 1753 06/01/22 1753 06/01/22 1753   99 2 °F (37 3 °C) (!) 112 18 132/56 99 % Temp Source Heart Rate Source Patient Position - Orthostatic VS BP Location FiO2 (%)   06/01/22 1753 06/01/22 1753 06/01/22 2033 06/01/22 1753 --   Oral Monitor Lying Left arm       Pain Score       06/01/22 2033       5          Wt Readings from Last 1 Encounters:   06/01/22 59 kg (130 lb 1 1 oz)     Additional Vital Signs:   Date/Time Temp Pulse Resp BP MAP (mmHg) SpO2 O2 Flow Rate (L/min)   06/03/22 11:16:02 98 2 °F (36 8 °C) 107 Abnormal  18 103/66 78 97 % --   06/03/22 07:41:49 97 6 °F (36 4 °C) 87 18 100/66 77 97 % --   06/03/22 04:18:36 98 2 °F (36 8 °C) 77 16 94/59 71 97 % --   06/03/22 01:27:22 -- 87 16 93/59 70 97 % --   06/03/22 00:25:14 -- 106 Abnormal  -- 95/61 72 97 % --   06/02/22 2354 -- -- -- 84/54 Abnormal  -- -- --   06/02/22 2211 -- -- -- 88/58 Abnormal  -- -- --   06/02/22 2210 -- -- -- 88/58 Abnormal  -- -- --   06/02/22 21:51:57 98 1 °F (36 7 °C) 95 16 85/51 Abnormal  62 96 % --   06/02/22 2000 -- -- -- -- -- -- --   06/02/22 1630 -- 98 14 132/85 102 98 % --   06/02/22 1615 -- 90 14 121/71 91 97 % --   06/02/22 1600 -- 92 20 124/70 92 98 % --   06/02/22 1545 97 5 °F (36 4 °C) 92 20 115/69 88 99 % --   06/02/22 1530 -- 84 18 120/58 84 100 % --   06/02/22 1515 -- 92 18 110/63 82 99 % --   06/02/22 1503 97 4 °F (36 3 °C) Abnormal  98 16 110/60 80 100 % 4 L/min   06/02/22 1318 98 8 °F (37 1 °C) 102 19 133/83 -- 100 % --       Date/Time Temp Pulse Resp BP MAP (mmHg) SpO2   06/02/22 07:33:30 97 8 °F (36 6 °C) 85 18 96/65 75 98 %   06/02/22 02:26:43 97 8 °F (36 6 °C) 95 17 114/79 91 95 %   06/02/22 00:58:39 97 5 °F (36 4 °C) 72 -- -- -- 95 %   06/01/22 23:56:24 97 8 °F (36 6 °C) 81 18 122/85 97 --   06/01/22 2313 98 8 °F (37 1 °C) 88 18 114/75 90 100 %   06/01/22 2300 -- -- -- -- -- --   06/01/22 2033 -- 85 18 111/73 87 100 %       Pertinent Labs/Diagnostic Test Results:   US kidney and bladder   Final Result by Heber Lopez DO (06/01 2325)   Mild left-sided hydronephrosis with a 2 mm stone in the left UVJ  XR abdomen 1 view kub 6/1   6/1Left renal calculi and distal left ureter calculus      5/30 CT renal stone study   2 mm proximal and 3 mm distal left ureteral calculi with mild upstream hydronephroureterosis  No perinephric collection    Punctate bilateral renal calculi        Results from last 7 days   Lab Units 06/03/22  0602 06/02/22 0627 06/01/22 1956 05/30/22  1200   WBC Thousand/uL 7 01 7 38 5 08 7 21   HEMOGLOBIN g/dL 11 3* 11 2* 12 9 13 9   HEMATOCRIT % 32 8* 33 4* 36 5 40 1   PLATELETS Thousands/uL 237 222 268 262   NEUTROS ABS Thousands/µL  --  4 94 2 98 5 71         Results from last 7 days   Lab Units 06/03/22  0602 06/02/22 0627 06/01/22 1956 05/30/22  1200   SODIUM mmol/L 139 139 140 141   POTASSIUM mmol/L 4 0 4 0 3 8 3 6   CHLORIDE mmol/L 111* 113* 109* 111*   CO2 mmol/L 24 21 24 23   ANION GAP mmol/L 4 5 7 7   BUN mg/dL 10 16 15 19   CREATININE mg/dL 0 86 0 99 1 07 1 04   EGFR ml/min/1 73sq m 89 75 68 71   CALCIUM mg/dL 8 6 8 1* 9 2 9 1     Results from last 7 days   Lab Units 06/01/22 1956 05/30/22  1200   AST U/L 11* 16   ALT U/L 7 10   ALK PHOS U/L 48 49   TOTAL PROTEIN g/dL 6 5 6 9   ALBUMIN g/dL 3 9 4 4   TOTAL BILIRUBIN mg/dL 0 36 0 52         Results from last 7 days   Lab Units 06/03/22  0602 06/02/22  0627 06/01/22 1956 05/30/22  1200   GLUCOSE RANDOM mg/dL 103 83 92 107                   Results from last 7 days   Lab Units 06/01/22  2032   LACTIC ACID mmol/L 0 7               Results from last 7 days   Lab Units 06/01/22 1956 05/30/22  1200   LIPASE u/L 21 15                 Results from last 7 days   Lab Units 06/01/22 1936 05/30/22  1210   CLARITY UA  Clear Slightly Cloudy   COLOR UA  Yellow Yellow   SPEC GRAV UA  1 015 >=1 030   PH UA  8 0 7 5   GLUCOSE UA mg/dl Negative Negative   KETONES UA mg/dl Negative 15 (1+)*   BLOOD UA  Moderate* Small*   PROTEIN UA mg/dl Negative Negative   NITRITE UA  Negative Negative   BILIRUBIN UA  Negative Negative   UROBILINOGEN UA E U /dl 0 2 1 0   LEUKOCYTES UA  Negative Negative   WBC UA /hpf 1-2 None Seen   RBC UA /hpf 1-2 0-1   BACTERIA UA /hpf Occasional Occasional   EPITHELIAL CELLS WET PREP /hpf Occasional Occasional   MUCUS THREADS   --  Occasional*             ED Treatment:   Medication Administration from 06/01/2022 1734 to 06/01/2022 2335       Date/Time Order Dose Route Action     06/01/2022 2034 ketorolac (TORADOL) injection 15 mg 15 mg Intravenous Given     06/01/2022 2034 tamsulosin (FLOMAX) capsule 0 4 mg 0 4 mg Oral Given     06/01/2022 2033 sodium chloride 0 9 % bolus 1,000 mL 1,000 mL Intravenous New Bag     06/01/2022 2034 ondansetron (ZOFRAN) injection 4 mg 4 mg Intravenous Given        Past Medical History:   Diagnosis Date    Anxiety     Depression     Encounter for IUD removal and reinsertion 5/29/2019    Lupus ([facility] )     Migraines     Ovarian cyst, right     PONV (postoperative nausea and vomiting)     Stress fracture, right foot, initial encounter for fracture      Present on Admission:   Chronic migraine without aura with status migrainosus, not intractable   Acquired hypothyroidism   Bipolar 1 disorder, mixed (HCC)   [facility]mat[facility] )      Admitting Diagnosis: Renal colic [V27]  Ureterolithiasis [N20 1]  Nausea [R11 0]  Flank pain [R10 9]  Fever [R50 9]  Age/Sex: 28 y o  female  Admission Orders:  Scheduled Medications:  amphetamine-dextroamphetamine, 20 mg, Oral, Daily  azaTHIOprine, 50 mg, Oral, BID  buPROPion, 150 mg, Oral, BID  docusate sodium, 100 mg, Oral, BID  famotidine, 20 mg, Oral, Daily  FLUoxetine, 20 mg, Oral, HS  hydroxychloroquine, 300 mg, Oral, Daily  lamoTRIgine, 150 mg, Oral, Daily  levothyroxine, 100 mcg, Oral, Daily  meloxicam, 15 mg, Oral, Daily  tamsulosin, 0 4 mg, Oral, Daily With Dinner  topiramate, 100 mg, Oral, BID    SUMAtriptan (IMITREX) tablet 100 mg  Dose: 100 mg  Freq:  Once Route: PO  Start: 06/02/22 0900 End: 06/02/22 0903  ondansetron (ZOFRAN-ODT) dispersible tablet 4 mg  Dose: 4 mg  Freq: Once Route: PO  Start: 06/02/22 1100 End: 06/02/22 1105  levofloxacin (LEVAQUIN) IVPB (premix in dextrose) 500 mg 100 mL  Dose: 500 mg  Freq: Once Route: IV  Last Dose: 500 mg (06/02/22 1543)  Start: 06/02/22 1430 End: 06/02/22 1643        Continuous IV Infusions:  sodium chloride, 125 mL/hr, Intravenous, ContinuousEnd: 06/02/22 1459  lactated ringers infusion  Rate: 100 mL/hr Dose: 100 mL/hr  Freq: Continuous Route: IV  Indications of Use: IV Hydration  Last Dose: 100 mL/hr (06/03/22 0558)  Start: 06/02/22 1515Stopped (06/03/22 1335  PRN Meds:  acetaminophen, 650 mg, Oral, Q6H PRN x1 6/2, x2 6/3  diazepam, 5 mg, Oral, Q6H PRN x1 6/2  HYDROmorphone, 0 5 mg, Intravenous, Q4H PRN severe pain  HYDROmorphone, 0 5 mg, Intravenous, Q4H PRN breakthru pain x2 6/2  HYDROmorphone, 0 2 mg, Intravenous, Q4H PRN moderate pain  metoclopramide, 10 mg, Intravenous, Q6H PRN x1 6/2  prochlorperazine, 10 mg, Oral, Q6H PRN  ondansetron (ZOFRAN) injection 4 mg  Dose: 4 mg  Freq: Every 6 hours PRN Route: IV  PRN Reasons: nausea,vomiting  Start: 06/01/22 2341 End: 06/02/22 0248 x1 6/2  promethazine (PHENERGAN) injection 6 25 mg  Dose: 6 25 mg  Freq: Every 10 minutes PRN Route: IV  PRN Reasons: nausea,vomiting  Start: 06/02/22 1621 x1 6/2 @1626  ondansetron (ZOFRAN) injection 4 mg  Dose: 4 mg  Freq: Once as needed Route: IV  PRN Reason: nausea  PRN Comment: First Line For Nausea  Start: 06/02/22 1503 End: 06/02/22 1600    strain all urine  Ambulate QID  NPO    IP CONSULT TO UROLOGY    Network Utilization Review Department  ATTENTION: Please call with any questions or concerns to [phone] and carefully listen to the prompts so that you are directed to the right person   All voicemails are confidential   Karlene Garcia all requests for admission clinical reviews, approved or denied determinations and any other requests to dedicated fax number below belonging to the campus where the patient is receiving treatment   List of dedicated fax numbers for the Facilities:  [address] [place] [address] DENIALS (Administrative/Medical Necessity) [phone]   [address] (Maternity/NICU/Pediatrics) [phone] [address] [place] [address]  [address] [place] [address] [place] [address] [address] [place] [address] [address] [facility] [phone]

## 2022-06-02 NOTE — PLAN OF CARE
"Chief Complaint   Patient presents with     Sinus Problem       Initial /73 (BP Location: Right arm, Patient Position: Right side, Cuff Size: Adult Regular)  Pulse 85  Temp 98.7  F (37.1  C) (Oral)  Resp 16  Ht 1.702 m (5' 7\")  Wt 90.8 kg (200 lb 3.2 oz)  LMP 03/30/2014  SpO2 100%  BMI 31.36 kg/m2 Estimated body mass index is 31.36 kg/(m^2) as calculated from the following:    Height as of this encounter: 1.702 m (5' 7\").    Weight as of this encounter: 90.8 kg (200 lb 3.2 oz).  Medication Reconciliation: complete     Will Lacey ARELLANO      " Problem: PAIN - ADULT  Goal: Verbalizes/displays adequate comfort level or baseline comfort level  Description: Interventions:  - Encourage patient to monitor pain and request assistance  - Assess pain using appropriate pain scale  - Administer analgesics based on type and severity of pain and evaluate response  - Implement non-pharmacological measures as appropriate and evaluate response  - Consider cultural and social influences on pain and pain management  - Notify physician/advanced practitioner if interventions unsuccessful or patient reports new pain  6/2/2022 1143 by Alicia Arreola RN  Outcome: Progressing  6/2/2022 1142 by Alicia Arreola RN  Outcome: Progressing     Problem: GENITOURINARY - ADULT  Goal: Maintains or returns to baseline urinary function  Description: INTERVENTIONS:  - Assess urinary function  - Encourage oral fluids to ensure adequate hydration if ordered  - Administer IV fluids as ordered to ensure adequate hydration  - Administer ordered medications as needed  - Offer frequent toileting  - Follow urinary retention protocol if ordered  6/2/2022 1143 by Alicia Arreola RN  Outcome: Progressing  6/2/2022 1142 by Alicia Arreola RN  Outcome: Progressing     Problem: GENITOURINARY - ADULT  Goal: Absence of urinary retention  Description: INTERVENTIONS:  - Assess patients ability to void and empty bladder  - Monitor I/O  - Bladder scan as needed  - Discuss with physician/AP medications to alleviate retention as needed  - Discuss catheterization for long term situations as appropriate  6/2/2022 1143 by Alicia Arreola RN  Outcome: Progressing

## 2022-06-03 ENCOUNTER — TELEPHONE (OUTPATIENT)
Dept: NEUROLOGY | Facility: CLINIC | Age: 32
End: 2022-06-03

## 2022-06-03 VITALS
HEART RATE: 86 BPM | TEMPERATURE: 98.1 F | HEIGHT: 60 IN | WEIGHT: 130.07 LBS | RESPIRATION RATE: 18 BRPM | DIASTOLIC BLOOD PRESSURE: 66 MMHG | SYSTOLIC BLOOD PRESSURE: 103 MMHG | OXYGEN SATURATION: 97 % | BODY MASS INDEX: 25.54 KG/M2

## 2022-06-03 LAB
ANION GAP SERPL CALCULATED.3IONS-SCNC: 4 MMOL/L (ref 4–13)
BACTERIA UR CULT: NORMAL
BUN SERPL-MCNC: 10 MG/DL (ref 5–25)
CALCIUM SERPL-MCNC: 8.6 MG/DL (ref 8.4–10.2)
CHLORIDE SERPL-SCNC: 111 MMOL/L (ref 96–108)
CO2 SERPL-SCNC: 24 MMOL/L (ref 21–32)
CREAT SERPL-MCNC: 0.86 MG/DL (ref 0.6–1.3)
ERYTHROCYTE [DISTWIDTH] IN BLOOD BY AUTOMATED COUNT: 12.2 % (ref 11.6–15.1)
GFR SERPL CREATININE-BSD FRML MDRD: 89 ML/MIN/1.73SQ M
GLUCOSE SERPL-MCNC: 103 MG/DL (ref 65–140)
HCT VFR BLD AUTO: 32.8 % (ref 34.8–46.1)
HGB BLD-MCNC: 11.3 G/DL (ref 11.5–15.4)
MCH RBC QN AUTO: 33.1 PG (ref 26.8–34.3)
MCHC RBC AUTO-ENTMCNC: 34.5 G/DL (ref 31.4–37.4)
MCV RBC AUTO: 96 FL (ref 82–98)
PLATELET # BLD AUTO: 237 THOUSANDS/UL (ref 149–390)
PMV BLD AUTO: 12.2 FL (ref 8.9–12.7)
POTASSIUM SERPL-SCNC: 4 MMOL/L (ref 3.5–5.3)
RBC # BLD AUTO: 3.41 MILLION/UL (ref 3.81–5.12)
SODIUM SERPL-SCNC: 139 MMOL/L (ref 135–147)
WBC # BLD AUTO: 7.01 THOUSAND/UL (ref 4.31–10.16)

## 2022-06-03 PROCEDURE — 80048 BASIC METABOLIC PNL TOTAL CA: CPT | Performed by: GENERAL PRACTICE

## 2022-06-03 PROCEDURE — 99239 HOSP IP/OBS DSCHRG MGMT >30: CPT | Performed by: GENERAL PRACTICE

## 2022-06-03 PROCEDURE — 85027 COMPLETE CBC AUTOMATED: CPT | Performed by: GENERAL PRACTICE

## 2022-06-03 RX ORDER — LEVOFLOXACIN 500 MG/1
500 TABLET, FILM COATED ORAL EVERY 24 HOURS
Qty: 3 TABLET | Refills: 0 | Status: SHIPPED | OUTPATIENT
Start: 2022-06-03 | End: 2022-06-06

## 2022-06-03 RX ORDER — FAMOTIDINE 20 MG/1
20 TABLET, FILM COATED ORAL DAILY
Qty: 30 TABLET | Refills: 0 | Status: SHIPPED | OUTPATIENT
Start: 2022-06-03 | End: 2022-06-24 | Stop reason: ALTCHOICE

## 2022-06-03 RX ORDER — OXYBUTYNIN CHLORIDE 5 MG/1
5 TABLET ORAL 3 TIMES DAILY PRN
Qty: 30 TABLET | Refills: 0 | Status: SHIPPED | OUTPATIENT
Start: 2022-06-03 | End: 2022-06-24

## 2022-06-03 RX ADMIN — FAMOTIDINE 20 MG: 20 TABLET ORAL at 09:33

## 2022-06-03 RX ADMIN — SODIUM CHLORIDE, SODIUM LACTATE, POTASSIUM CHLORIDE, AND CALCIUM CHLORIDE 100 ML/HR: .6; .31; .03; .02 INJECTION, SOLUTION INTRAVENOUS at 05:58

## 2022-06-03 RX ADMIN — LEVOTHYROXINE SODIUM 100 MCG: 100 TABLET ORAL at 05:52

## 2022-06-03 RX ADMIN — ACETAMINOPHEN 650 MG: 325 TABLET ORAL at 05:57

## 2022-06-03 RX ADMIN — ACETAMINOPHEN 650 MG: 325 TABLET ORAL at 00:27

## 2022-06-03 RX ADMIN — MELOXICAM 15 MG: 7.5 TABLET ORAL at 09:36

## 2022-06-03 RX ADMIN — DEXTROAMPHETAMINE SACCHARATE, AMPHETAMINE ASPARTATE, DEXTROAMPHETAMINE SULFATE AND AMPHETAMINE SULFATE 20 MG: 2.5; 2.5; 2.5; 2.5 TABLET ORAL at 09:33

## 2022-06-03 RX ADMIN — AZATHIOPRINE 50 MG: 50 TABLET ORAL at 09:33

## 2022-06-03 RX ADMIN — LAMOTRIGINE 150 MG: 100 TABLET ORAL at 09:33

## 2022-06-03 RX ADMIN — HYDROXYCHLOROQUINE SULFATE 300 MG: 200 TABLET, FILM COATED ORAL at 09:35

## 2022-06-03 RX ADMIN — TOPIRAMATE 100 MG: 100 TABLET, FILM COATED ORAL at 09:33

## 2022-06-03 RX ADMIN — BUPROPION HYDROCHLORIDE 150 MG: 150 TABLET, EXTENDED RELEASE ORAL at 09:33

## 2022-06-03 NOTE — PLAN OF CARE
Problem: PAIN - ADULT  Goal: Verbalizes/displays adequate comfort level or baseline comfort level  Description: Interventions:  - Encourage patient to monitor pain and request assistance  - Assess pain using appropriate pain scale  - Administer analgesics based on type and severity of pain and evaluate response  - Implement non-pharmacological measures as appropriate and evaluate response  - Consider cultural and social influences on pain and pain management  - Notify physician/advanced practitioner if interventions unsuccessful or patient reports new pain  Outcome: Progressing     Problem: GENITOURINARY - ADULT  Goal: Maintains or returns to baseline urinary function  Description: INTERVENTIONS:  - Assess urinary function  - Encourage oral fluids to ensure adequate hydration if ordered  - Administer IV fluids as ordered to ensure adequate hydration  - Administer ordered medications as needed  - Offer frequent toileting  - Follow urinary retention protocol if ordered  Outcome: Progressing  Goal: Absence of urinary retention  Description: INTERVENTIONS:  - Assess patients ability to void and empty bladder  - Monitor I/O  - Bladder scan as needed  - Discuss with physician/AP medications to alleviate retention as needed  - Discuss catheterization for long term situations as appropriate  Outcome: Progressing

## 2022-06-03 NOTE — UTILIZATION REVIEW
Inpatient Admission Authorization Request   NOTIFICATION OF INPATIENT ADMISSION/INPATIENT AUTHORIZATION REQUEST   SERVICING FACILITY:   [address]  Tax ID: [id number]  NPI: [id number]  Place of Service: Inpatient [facility]  [facility]  Place of Service Code: 24     ATTENDING PROVIDER:  Attending Name and NPI#: Lynn Martinez [[id number]]  Address: [address]  Phone: [phone]     UTILIZATION REVIEW CONTACT:  Mark Otero Utilization   Network Utilization Review Department  Phone: [phone]  Fax: [phone]  Email: [email or web address]     PHYSICIAN ADVISORY SERVICES:  FOR QPDS-DB-QMED REVIEW - MEDICAL NECESSITY DENIAL  Phone: [phone]  Fax: [phone]  Email: [email or web address]     TYPE OF REQUEST:  Inpatient Status     ADMISSION INFORMATION:  ADMISSION DATE/TIME: 6/2/22  1:38 PM  PATIENT DIAGNOSIS CODE/DESCRIPTION:  Renal colic [S95]  Ureterolithiasis [N20 1]  Nausea [R11 0]  Flank pain [R10 9]  Fever [R50 9]  DISCHARGE DATE/TIME: 6/3/2022  2:43 PM   IMPORTANT INFORMATION:  Please contact the Mark Otero directly with any questions or concerns regarding this request  Department voicemails are confidential     Send requests for admission clinical reviews, concurrent reviews, approvals, and administrative denials due to lack of clinical to fax [phone]

## 2022-06-03 NOTE — DISCHARGE INSTR - AVS FIRST PAGE
Dear Len Gonsalves,     It was our pleasure to care for you here at [facility]  It is our hope that we were always able to exceed the expected standards for your care during your stay  You were hospitalized due to kidney stone  You were cared for on the 4th floor by Rae Ganser, MD under the service of Radhika Bravo,  with the [facility] who covers for your primary care physician (PCP), Michelle Menjivar PA-C, while you were hospitalized  If you have any questions or concerns related to this hospitalization, you may contact us at [phone]  For follow up as well as any medication refills, we recommend that you follow up with your primary care physician  A registered nurse will reach out to you by phone within a few days after your discharge to answer any additional questions that you may have after going home    However, at this time we provide for you here, the most important instructions / recommendations at discharge:     Notable Medication Adjustments -   Please begin taking oxybutynin 5 mg 3 times daily as needed for bladder spasm  Please take Levaquin 500 mg starting 1 day before stent removal on June 5th for duration of 3 days ending on June 7th  Testing Required after Discharge -   See below  Important follow up information -   Please follow up with your primary care provider within one week of hospital discharge  Please follow up with urology for KUB and ultrasound 6 weeks after home stent removal  Other Instructions -   Some abdominal pain is expected in the setting of ureteral stent placement   Return to the hospital if you note any symptoms of infection such as fever and chills  Please review this entire after visit summary as additional general instructions including medication list, appointments, activity, diet, any pertinent wound care, and other additional recommendations from your care team that may be provided for you       Sincerely,     Tina Fabian MD

## 2022-06-03 NOTE — TELEPHONE ENCOUNTER
Pt called and states that she is currently in the hospital  She has botox appt today at 3:15 that she needs to cancel bec she is currently admitted  She would like to reschedule her botox appt, Preferred 6/10/22  There are few blocked botox appt that day  Per ignacio Dutta to schedule pt on 6/10/22 at 0915  Pt scheduled

## 2022-06-03 NOTE — TELEPHONE ENCOUNTER
Patient feels well after procedure  Endorses a pain of 2/3 out of 10 and complains mostly of achiness  No issues with urination  Advised to take tylenol and use heating pad with aggressive hydration and then take prescribed pain medication as needed  Described how to remove stent at home  Patient verbalizes understanding but I offered to call her on Monday and be on the phone while she tried to remove It and she would like that  I promised to call her between 8-9 am on Monday  Advised her to call on call provider this weekend with any issues

## 2022-06-03 NOTE — PLAN OF CARE
Problem: Potential for Falls  Goal: Patient will remain free of falls  Description: INTERVENTIONS:  - Educate patient/family on patient safety including physical limitations  - Instruct patient to call for assistance with activity   - Consult OT/PT to assist with strengthening/mobility   - Keep Call bell within reach  - Keep bed low and locked with side rails adjusted as appropriate  - Keep care items and personal belongings within reach  - Initiate and maintain comfort rounds  - Make Fall Risk Sign visible to staff  - Apply yellow socks and bracelet for high fall risk patients  - Consider moving patient to room near nurses station  6/3/2022 0209 by Marty Chan RN  Outcome: Progressing  6/3/2022 0209 by Marty Chan RN  Outcome: Progressing     Problem: PAIN - ADULT  Goal: Verbalizes/displays adequate comfort level or baseline comfort level  Description: Interventions:  - Encourage patient to monitor pain and request assistance  - Assess pain using appropriate pain scale  - Administer analgesics based on type and severity of pain and evaluate response  - Implement non-pharmacological measures as appropriate and evaluate response  - Consider cultural and social influences on pain and pain management  - Notify physician/advanced practitioner if interventions unsuccessful or patient reports new pain  6/3/2022 0209 by Marty Chan RN  Outcome: Progressing  6/3/2022 0209 by Marty Chan RN  Outcome: Progressing     Problem: GENITOURINARY - ADULT  Goal: Maintains or returns to baseline urinary function  Description: INTERVENTIONS:  - Assess urinary function  - Encourage oral fluids to ensure adequate hydration if ordered  - Administer IV fluids as ordered to ensure adequate hydration  - Administer ordered medications as needed  - Offer frequent toileting  - Follow urinary retention protocol if ordered  6/3/2022 0209 by Marty Chan RN  Outcome: Progressing  6/3/2022 0209 by Marty Chan RN  Outcome: Progressing  Goal: Absence of urinary retention  Description: INTERVENTIONS:  - Assess patients ability to void and empty bladder  - Monitor I/O  - Bladder scan as needed  - Discuss with physician/AP medications to alleviate retention as needed  - Discuss catheterization for long term situations as appropriate  6/3/2022 0209 by Irving Lopes RN  Outcome: Progressing  6/3/2022 0209 by Irving Lopes RN  Outcome: Progressing     Problem: GENITOURINARY - ADULT  Goal: Absence of urinary retention  Description: INTERVENTIONS:  - Assess patients ability to void and empty bladder  - Monitor I/O  - Bladder scan as needed  - Discuss with physician/AP medications to alleviate retention as needed  - Discuss catheterization for long term situations as appropriate  6/3/2022 0209 by Irving Lopes RN  Outcome: Progressing  6/3/2022 0209 by Irving Lopes RN  Outcome: Progressing

## 2022-06-03 NOTE — DISCHARGE SUMMARY
[facility]  Discharge- [address] [facility] 1990, 28 y o  female MRN: [id number]  Unit/Bed#: S -01 Encounter: [id number]  Primary Care Provider: Enedina Westbrook PA-C   Date and time admitted to hospital: 6/1/2022  7:13 PM    * Ureterolithiasis with left mild hydronephroureterosis  Assessment & Plan   Presentation: Patient returns to the ED due to complaints of continued left flank pain  Patient originally presented to the ED 2 days ago due to complaints of abrupt onset of left lower abdominal pain that radiated to the left flank region with associated nausea and vomiting  Patient was diagnosed with ureteral stones at that time and discharged home with prescriptions for Motrin, Percocet and Zofran  Patient returned to the ED this evening due to complaints of continued left flank pain, nausea, low grade fevers and urinary urgency and frequency   CT from 05/30/2022: 2 mm proximal and 3 mm distal left ureteral stones with mild hydronephroureterosis  No perinephric collection   UA: Moderate blood   Patient is now s/p cystoscopy, ureteroscopy, retrograde pyelogram and insertion of left ureteral stent    Plan   Continue pain control regimen pending outpatient follow up for stent removal   Levaquin 500 mg daily for 3 days   Oxybutynin 5 mg PRN for 3 days    Systemic lupus erythematosus ([facility] [address] )  Assessment & Plan  · Patient known to Dr Estefany Arthur of Rheumatology  · Continue Azathioprine  Acquired hypothyroidism  Assessment & Plan  · Continue levothyroxine  Chronic migraine without aura with status migrainosus, not intractable  Assessment & Plan  · Continue scheduled Topamax, Lamictal and Mobic  · Continue Valium and Compazine prn  · Patient known to [facility] as an outpatient  Bipolar 1 disorder, mixed (HCC)  Assessment & Plan  · Continue Prozac, Topamax and Wellbutrin         Medical Problems             Resolved Problems  Date Reviewed: 6/1/2022   None Discharging Resident: Toya Aguilar MD  Discharging Attending: Stacie Howe DO  PCP: Tin Gilbert PA-C  Admission Date:   Admission Orders (From admission, onward)     Ordered        06/02/22 1338  Inpatient Admission  Once            06/01/22 2208  Place in Observation  Once                      Discharge Date: 06/03/22    Consultations During Hospital Stay:  · Urology    Procedures Performed:   · Cystoscopy, ureteroscopy, retrograde pyelogram insertion of left ureteral stent    Significant Findings / Test Results:   · CT showed one 2 mm one 3 mm proximal renal stone  · Stone unable to be visualized during cystoscopy suggesting passage of stone  Stent placed per Urology  Incidental Findings:   · None     Test Results Pending at Discharge (will require follow up): · None     Outpatient Tests Requested:  · KUB in 6 weeks    Complications:  None    Reason for Admission:  Renal stone    Hospital Course:   Berlin Alan is a 28 y o  female patient who originally presented to the hospital on 6/1/2022 due to abdominal pain  CT abdomen showed a 2 mm and a 3 mm left proximal renal stone as described above  Patient evaluated by Urology and provided analgesia and antiemetics for episodic vomiting  In the OR stone was not visualized in ureter suggesting prior passage  Left ureteral stent placed per Urology  Patient tolerated procedure well  Following the operation, patient was noted to have slightly low blood pressure in the 23'D systolic and was provided with IV hydration  Plan to discharge on 3 day course of oxybutynin and Levaquin with instructions to follow up with Urology outpatient on June 6th  Please see above list of diagnoses and related plan for additional information  Condition at Discharge: good    Discharge Day Visit / Exam:   * Please refer to separate progress note for these details *    Discussion with Family: Patient declined call to        Discharge instructions/Information to patient and family:   See after visit summary for information provided to patient and family  Provisions for Follow-Up Care:  See after visit summary for information related to follow-up care and any pertinent home health orders  Disposition:   Home    Planned Readmission: None    Discharge Medications:  See after visit summary for reconciled discharge medications provided to patient and/or family        **Please Note: This note may have been constructed using a voice recognition system**

## 2022-06-06 ENCOUNTER — TRANSITIONAL CARE MANAGEMENT (OUTPATIENT)
Dept: FAMILY MEDICINE CLINIC | Facility: CLINIC | Age: 32
End: 2022-06-06

## 2022-06-06 NOTE — TELEPHONE ENCOUNTER
Called patient as promised and stayed don phone with her and walked her through the stent removal   She removed the stent with out issue  Did advise on lingering symptoms for the first 24 hours   No further questions

## 2022-06-06 NOTE — UTILIZATION REVIEW
Notification of Discharge   This is a Notification of Discharge from our facility [address]  Please be advised that this patient has been discharge from our facility  Below you will find the admission and discharge date and time including the patients disposition  UTILIZATION REVIEW CONTACT:  Luciana Rios MA  Utilization   Network Utilization Review Department  Phone: [phone] x carefully listen to the prompts  All voicemails are confidential   Email: [email or web address]     PHYSICIAN ADVISORY SERVICES:  FOR SJNA-NF-PCJP REVIEW - MEDICAL NECESSITY DENIAL  Phone: [phone]  Fax: [phone]  Email: [email or web address]     PRESENTATION DATE: 6/1/2022  7:13 PM  OBERVATION ADMISSION DATE:   INPATIENT ADMISSION DATE: 6/2/22  1:38 PM   DISCHARGE DATE: 6/3/2022  2:43 PM  DISPOSITION: Home/Self Care Home/Self Care      IMPORTANT INFORMATION:  Send all requests for admission clinical reviews, approved or denied determinations and any other requests to dedicated fax number below belonging to the campus where the patient is receiving treatment   List of dedicated fax numbers:  [address] DENIALS (Administrative/Medical Necessity) [phone]   [address] (Maternity/NICU/Pediatrics) [phone]   Mihir Ortiz [phone]   130 [facility] [phone]   [address] [place] [address] [phone]   [address] [phone]   [facility]  [phone]   [address] [place] [address], [facility]  2401 [facility] 1000 [facility] [phone]

## 2022-06-08 ENCOUNTER — TELEPHONE (OUTPATIENT)
Dept: UROLOGY | Facility: CLINIC | Age: 32
End: 2022-06-08

## 2022-06-08 ENCOUNTER — NURSE TRIAGE (OUTPATIENT)
Dept: OTHER | Facility: OTHER | Age: 32
End: 2022-06-08

## 2022-06-08 NOTE — TELEPHONE ENCOUNTER
Patient has been experiencing ongoing nausea from kidney stones and removed her stent on 6/6/22  She reports she has been vomiting non stop the last 24 hours and is unsure if it is from the Levaquin or stent removal  She is not showing any s/s of dehydration but does have a headache and lightheadeness at times  Patient would like a call back to advise  Reason for Disposition   SEVERE headache and vomiting    Answer Assessment - Initial Assessment Questions  1  VOMITING SEVERITY: "How many times have you vomited in the past 24 hours?"      - MILD:  1 - 2 times/day     - MODERATE: 3 - 5 times/day, decreased oral intake without significant weight loss or symptoms of dehydration     - SEVERE: 6 or more times/day, vomits everything or nearly everything, with significant weight loss, symptoms of dehydration       Severe   2  ONSET: "When did the vomiting begin?"       24 hours   3  FLUIDS: "What fluids or food have you vomited up today?" "Have you been able to keep any fluids down?"      Non-stop the last 24 hours   4  ABDOMINAL PAIN: "Are your having any abdominal pain?" If yes : "How bad is it and what does it feel like?" (e g , crampy, dull, intermittent, constant)       Denies   5  DIARRHEA: "Is there any diarrhea?" If Yes, ask: "How many times today?"       Denies   6  CONTACTS: "Is there anyone else in the family with the same symptoms?"       N/A  7  CAUSE: "What do you think is causing your vomiting?"      Had nausea ever since kidney stones started, vomiting started, possible reaction to Levaquin   8  HYDRATION STATUS: "Any signs of dehydration?" (e g , dry mouth [not only dry lips], too weak to stand) "When did you last urinate?"     Denies   9  OTHER SYMPTOMS: "Do you have any other symptoms?" (e g , fever, headache, vertigo, vomiting blood or coffee grounds, recent head injury)      Migraine, lightheadedness   10   PREGNANCY: "Is there any chance you are pregnant?" "When was your last menstrual period?" Denies    Protocols used: VOMITING-ADULT-OH

## 2022-06-08 NOTE — TELEPHONE ENCOUNTER
Regarding: vomiting  ----- Message from [facility] sent at 6/8/2022 10:37 AM EDT -----  "Patient stated she has been experiencing vomiting ever since her stent removal "

## 2022-06-08 NOTE — TELEPHONE ENCOUNTER
S/p   CYSTOSCOPY,URETEROSCOPY, RETROGRADE PYELOGRAM AND INSERTION STENT URETERAL (Left Bladder)   Anesthesia type: general   Diagnosis: Ureterolithiasis [N20 1]     Stent removed on 6/6/22 since Monday  Vomiting started yesterday morning  + nausea since stent removal Patient can not keep anything down  Also states headaches  Ha not been around any one ill  Occasional cramping in left side where stent was  Patient states more discomfort than pain  Voiding without difficulty, no fevers or chills  Last dose of Levaquin dose was yesterday  Not taking oxybutynin due to side effects of blurry vision  zofran did not help with nausea, otc emetrol did not work as well  Patient states only taking small sips of water  Is nauseated with dry heaves

## 2022-06-09 ENCOUNTER — OFFICE VISIT (OUTPATIENT)
Dept: FAMILY MEDICINE CLINIC | Facility: CLINIC | Age: 32
End: 2022-06-09
Payer: COMMERCIAL

## 2022-06-09 ENCOUNTER — HOSPITAL ENCOUNTER (EMERGENCY)
Facility: HOSPITAL | Age: 32
Discharge: HOME | End: 2022-06-09
Attending: EMERGENCY MEDICINE | Admitting: EMERGENCY MEDICINE
Payer: COMMERCIAL

## 2022-06-09 VITALS
DIASTOLIC BLOOD PRESSURE: 68 MMHG | HEIGHT: 60 IN | BODY MASS INDEX: 22.54 KG/M2 | HEART RATE: 72 BPM | RESPIRATION RATE: 16 BRPM | WEIGHT: 114.8 LBS | SYSTOLIC BLOOD PRESSURE: 102 MMHG

## 2022-06-09 VITALS
RESPIRATION RATE: 16 BRPM | HEART RATE: 86 BPM | SYSTOLIC BLOOD PRESSURE: 104 MMHG | OXYGEN SATURATION: 99 % | DIASTOLIC BLOOD PRESSURE: 67 MMHG | BODY MASS INDEX: 21.86 KG/M2 | WEIGHT: 111 LBS | TEMPERATURE: 97.6 F

## 2022-06-09 DIAGNOSIS — R51.9 RECURRENT HEADACHE: Primary | ICD-10-CM

## 2022-06-09 DIAGNOSIS — G43.701 CHRONIC MIGRAINE WITHOUT AURA WITH STATUS MIGRAINOSUS, NOT INTRACTABLE: ICD-10-CM

## 2022-06-09 DIAGNOSIS — N20.1 URETEROLITHIASIS: Primary | ICD-10-CM

## 2022-06-09 DIAGNOSIS — R11.2 NAUSEA & VOMITING: ICD-10-CM

## 2022-06-09 DIAGNOSIS — R11.2 NAUSEA AND VOMITING, UNSPECIFIED VOMITING TYPE: ICD-10-CM

## 2022-06-09 LAB
ANION GAP SERPL CALCULATED.3IONS-SCNC: 12 MMOL/L (ref 4–13)
BACTERIA UR QL AUTO: ABNORMAL /HPF
BASOPHILS # BLD AUTO: 0.06 THOUSANDS/ΜL (ref 0–0.1)
BASOPHILS NFR BLD AUTO: 1 % (ref 0–1)
BILIRUB UR QL STRIP: ABNORMAL
BUN SERPL-MCNC: 24 MG/DL (ref 5–25)
CALCIUM SERPL-MCNC: 9.6 MG/DL (ref 8.4–10.2)
CHLORIDE SERPL-SCNC: 105 MMOL/L (ref 96–108)
CLARITY UR: CLEAR
CO2 SERPL-SCNC: 22 MMOL/L (ref 21–32)
COLOR UR: YELLOW
CREAT SERPL-MCNC: 0.79 MG/DL (ref 0.6–1.3)
EOSINOPHIL # BLD AUTO: 0.14 THOUSAND/ΜL (ref 0–0.61)
EOSINOPHIL NFR BLD AUTO: 2 % (ref 0–6)
ERYTHROCYTE [DISTWIDTH] IN BLOOD BY AUTOMATED COUNT: 11.9 % (ref 11.6–15.1)
GFR SERPL CREATININE-BSD FRML MDRD: 99 ML/MIN/1.73SQ M
GLUCOSE SERPL-MCNC: 98 MG/DL (ref 65–140)
GLUCOSE UR STRIP-MCNC: NEGATIVE MG/DL
HCT VFR BLD AUTO: 40.9 % (ref 34.8–46.1)
HGB BLD-MCNC: 14.8 G/DL (ref 11.5–15.4)
HGB UR QL STRIP.AUTO: ABNORMAL
HYALINE CASTS #/AREA URNS LPF: ABNORMAL /LPF
IMM GRANULOCYTES # BLD AUTO: 0.05 THOUSAND/UL (ref 0–0.2)
IMM GRANULOCYTES NFR BLD AUTO: 1 % (ref 0–2)
KETONES UR STRIP-MCNC: ABNORMAL MG/DL
LEUKOCYTE ESTERASE UR QL STRIP: NEGATIVE
LYMPHOCYTES # BLD AUTO: 1.63 THOUSANDS/ΜL (ref 0.6–4.47)
LYMPHOCYTES NFR BLD AUTO: 17 % (ref 14–44)
MCH RBC QN AUTO: 32.5 PG (ref 26.8–34.3)
MCHC RBC AUTO-ENTMCNC: 36.2 G/DL (ref 31.4–37.4)
MCV RBC AUTO: 90 FL (ref 82–98)
MONOCYTES # BLD AUTO: 0.56 THOUSAND/ΜL (ref 0.17–1.22)
MONOCYTES NFR BLD AUTO: 6 % (ref 4–12)
MUCOUS THREADS UR QL AUTO: ABNORMAL
NEUTROPHILS # BLD AUTO: 6.95 THOUSANDS/ΜL (ref 1.85–7.62)
NEUTS SEG NFR BLD AUTO: 73 % (ref 43–75)
NITRITE UR QL STRIP: NEGATIVE
NON-SQ EPI CELLS URNS QL MICRO: ABNORMAL /HPF
NRBC BLD AUTO-RTO: 0 /100 WBCS
PH UR STRIP.AUTO: 6 [PH]
PLATELET # BLD AUTO: 360 THOUSANDS/UL (ref 149–390)
PMV BLD AUTO: 11.6 FL (ref 8.9–12.7)
POTASSIUM SERPL-SCNC: 3.6 MMOL/L (ref 3.5–5.3)
PROT UR STRIP-MCNC: ABNORMAL MG/DL
RBC # BLD AUTO: 4.56 MILLION/UL (ref 3.81–5.12)
RBC #/AREA URNS AUTO: ABNORMAL /HPF
SODIUM SERPL-SCNC: 139 MMOL/L (ref 135–147)
SP GR UR STRIP.AUTO: >=1.03 (ref 1–1.03)
UROBILINOGEN UR QL STRIP.AUTO: 0.2 E.U./DL
WBC # BLD AUTO: 9.39 THOUSAND/UL (ref 4.31–10.16)
WBC #/AREA URNS AUTO: ABNORMAL /HPF

## 2022-06-09 PROCEDURE — 80048 BASIC METABOLIC PNL TOTAL CA: CPT | Performed by: EMERGENCY MEDICINE

## 2022-06-09 PROCEDURE — 96365 THER/PROPH/DIAG IV INF INIT: CPT

## 2022-06-09 PROCEDURE — 81001 URINALYSIS AUTO W/SCOPE: CPT | Performed by: EMERGENCY MEDICINE

## 2022-06-09 PROCEDURE — 36415 COLL VENOUS BLD VENIPUNCTURE: CPT | Performed by: EMERGENCY MEDICINE

## 2022-06-09 PROCEDURE — 96375 TX/PRO/DX INJ NEW DRUG ADDON: CPT

## 2022-06-09 PROCEDURE — 99496 TRANSJ CARE MGMT HIGH F2F 7D: CPT | Performed by: FAMILY MEDICINE

## 2022-06-09 PROCEDURE — 99284 EMERGENCY DEPT VISIT MOD MDM: CPT | Performed by: EMERGENCY MEDICINE

## 2022-06-09 PROCEDURE — 96361 HYDRATE IV INFUSION ADD-ON: CPT

## 2022-06-09 PROCEDURE — 99283 EMERGENCY DEPT VISIT LOW MDM: CPT

## 2022-06-09 PROCEDURE — 87086 URINE CULTURE/COLONY COUNT: CPT | Performed by: EMERGENCY MEDICINE

## 2022-06-09 PROCEDURE — 85025 COMPLETE CBC W/AUTO DIFF WBC: CPT | Performed by: EMERGENCY MEDICINE

## 2022-06-09 RX ORDER — METOCLOPRAMIDE 10 MG/1
10 TABLET ORAL 3 TIMES DAILY PRN
Qty: 20 TABLET | Refills: 3 | Status: SHIPPED | OUTPATIENT
Start: 2022-06-09

## 2022-06-09 RX ORDER — DIPHENHYDRAMINE HYDROCHLORIDE 50 MG/ML
25 INJECTION INTRAMUSCULAR; INTRAVENOUS ONCE
Status: COMPLETED | OUTPATIENT
Start: 2022-06-09 | End: 2022-06-09

## 2022-06-09 RX ORDER — METOCLOPRAMIDE HYDROCHLORIDE 5 MG/ML
10 INJECTION INTRAMUSCULAR; INTRAVENOUS ONCE
Status: COMPLETED | OUTPATIENT
Start: 2022-06-09 | End: 2022-06-09

## 2022-06-09 RX ORDER — ACETAMINOPHEN 325 MG/1
650 TABLET ORAL ONCE
Status: COMPLETED | OUTPATIENT
Start: 2022-06-09 | End: 2022-06-09

## 2022-06-09 RX ORDER — SODIUM CHLORIDE, SODIUM LACTATE, POTASSIUM CHLORIDE, CALCIUM CHLORIDE 600; 310; 30; 20 MG/100ML; MG/100ML; MG/100ML; MG/100ML
1000 INJECTION, SOLUTION INTRAVENOUS CONTINUOUS
Status: DISCONTINUED | OUTPATIENT
Start: 2022-06-09 | End: 2022-06-09 | Stop reason: HOSPADM

## 2022-06-09 RX ORDER — KETOROLAC TROMETHAMINE 30 MG/ML
10 INJECTION, SOLUTION INTRAMUSCULAR; INTRAVENOUS ONCE
Status: COMPLETED | OUTPATIENT
Start: 2022-06-09 | End: 2022-06-09

## 2022-06-09 RX ORDER — MAGNESIUM SULFATE HEPTAHYDRATE 40 MG/ML
2 INJECTION, SOLUTION INTRAVENOUS ONCE
Status: COMPLETED | OUTPATIENT
Start: 2022-06-09 | End: 2022-06-09

## 2022-06-09 RX ADMIN — SODIUM CHLORIDE, SODIUM LACTATE, POTASSIUM CHLORIDE, AND CALCIUM CHLORIDE 1000 ML: .6; .31; .03; .02 INJECTION, SOLUTION INTRAVENOUS at 08:42

## 2022-06-09 RX ADMIN — KETOROLAC TROMETHAMINE 9.9 MG: 30 INJECTION, SOLUTION INTRAMUSCULAR; INTRAVENOUS at 08:43

## 2022-06-09 RX ADMIN — DIPHENHYDRAMINE HYDROCHLORIDE 25 MG: 50 INJECTION, SOLUTION INTRAMUSCULAR; INTRAVENOUS at 08:44

## 2022-06-09 RX ADMIN — MAGNESIUM SULFATE HEPTAHYDRATE 2 G: 40 INJECTION, SOLUTION INTRAVENOUS at 08:48

## 2022-06-09 RX ADMIN — ACETAMINOPHEN 650 MG: 325 TABLET ORAL at 11:15

## 2022-06-09 RX ADMIN — METOCLOPRAMIDE HYDROCHLORIDE 10 MG: 5 INJECTION INTRAMUSCULAR; INTRAVENOUS at 08:46

## 2022-06-09 NOTE — PROGRESS NOTES
Assessment and Plan:    Problem List Items Addressed This Visit    None                {Assess/PlanSmartLinks:94028}          Subjective:      Patient ID: Canelo Pierson is a 28 y o  female  CC:    No chief complaint on file        HPI:    HPI    {Common ambulatory SmartLinks:28360}      Review of Systems      Data to review:       Objective:    Vitals:    06/09/22 1825   BP: 102/68   BP Location: Left arm   Patient Position: Sitting   Cuff Size: Standard   Pulse: 72   Resp: 16   Weight: 52 1 kg (114 lb 12 8 oz)   Height: 4' 11 75" (1 518 m)        Physical Exam

## 2022-06-09 NOTE — DISCHARGE INSTRUCTIONS
Diagnosis; recurrent headache/ nausea/ vomiting     - activity /diet as tolerated --     - please start diet out with frequent sips of liquids- advancing to more solid foods as tolerated     - for nausea/ vomiting : reglan 1 tablet  every 8 hrs     -  if your urine culture grows out any infection we will contact you     - please return to  the er for any fevers- temp > 100 4/ any worsening headaches/ any persistent vomiting  or any new/ worsening / flank pain or any new/ worsening/concerning symptoms to you

## 2022-06-09 NOTE — ED NOTES
Spoke to the lab about adding on urine culture to the urine that was sent down earlier this morning      Feliberto Payan RN  06/09/22 1016

## 2022-06-09 NOTE — PROGRESS NOTES
Assessment/Plan:   1  Ureterolithiasis with left hydronephrosis status post cystoscopy or stent  Patient follow-up with Urology, follow-up CT is ordered   2  Chronic migraine, has Botox scheduled with Neurology tomorrow  3  Nausea vomiting headache, seen in ER today symptom resolved  4  Return after specialist evaluation and treatment if needed    Chronic migraine without aura with status migrainosus, not intractable  As Botox injection scheduled for tomorrow       Diagnoses and all orders for this visit:    Ureterolithiasis with left mild hydronephroureterosis    Chronic migraine without aura with status migrainosus, not intractable    Nausea and vomiting, unspecified vomiting type       Subjective:     Patient ID: Angie Pedroza is a 28 y o  female  Patient was at [address] June 1st discharged 3rd  She had ureteral lithiasis with mild hydronephrosis  She had cystoscopy with stent  Patient to follow-up with Urology  She is upcoming repeat CT ordered  Today patient had nausea vomiting and migraine went to the ER  Doing much better at the present time      Review of Systems   Constitutional: Negative  HENT: Negative  Eyes: Negative  Respiratory: Negative  Cardiovascular: Negative  Gastrointestinal:        HPI   Endocrine: Negative  Genitourinary:        HPI   Musculoskeletal: Negative  Skin: Negative  Allergic/Immunologic: Negative  Neurological:        HPI   Hematological: Negative  Psychiatric/Behavioral: Negative  Objective:     Physical Exam  Vitals and nursing note reviewed  Constitutional:       Appearance: Normal appearance  HENT:      Head: Normocephalic and atraumatic  Cardiovascular:      Rate and Rhythm: Normal rate and regular rhythm  Heart sounds: Normal heart sounds  Pulmonary:      Effort: Pulmonary effort is normal       Breath sounds: Normal breath sounds     Abdominal:      General: Bowel sounds are normal       Palpations: Abdomen is soft  Tenderness: There is no abdominal tenderness  There is no right CVA tenderness or left CVA tenderness  Musculoskeletal:      Cervical back: Neck supple  No rigidity  Right lower leg: No edema  Left lower leg: No edema  Skin:     General: Skin is warm and dry  Neurological:      General: No focal deficit present  Mental Status: She is alert  Psychiatric:         Mood and Affect: Mood normal            Vitals:    06/09/22 1825   BP: 102/68   BP Location: Left arm   Patient Position: Sitting   Cuff Size: Standard   Pulse: 72   Resp: 16   Weight: 52 1 kg (114 lb 12 8 oz)   Height: 4' 11 75" (1 518 m)       Transitional Care Management Review:  Len Gonsalves is a 28 y o  female here for TCM follow up  During the TCM phone call patient stated:    TCM Call (since 5/9/2022)     Date and time call was made  6/6/2022 11:20 AM    Hospital care reviewed  Records reviewed    Patient was hospitial[facility] at  [facility]    Date of Admission  06/01/22    Date of discharge  06/03/22    Diagnosis  Ureterolithiasis with left mild hydronephroureterosis    Disposition  Home    Were the patients medications reviewed and updated  No    Current Symptoms  None      TCM Call (since 5/9/2022)     Post hospital issues  None    Should patient be enrolled in anticoag monitoring? No    Scheduled for follow up?   Yes    Did you obtain your prescribed medications  Yes    Do you need help managing your prescriptions or medications  No    Is transportation to your appointment needed  No    I have advised the patient to call PCP with any new or worsening symptoms  Ольга Diamond, Practice Administrator          [address], DO

## 2022-06-09 NOTE — TELEPHONE ENCOUNTER
Spoke with patient to check on her status  She states she is pulling into [facility] ED now as she is still vomiting and can't keep anything down  Patient states she is tachycardic and dizzy also  Informed her plan was to repeat CT scan if she was not feeling better  ED should access notes to see plan    Advised patient this will be sent back to provider to make aware

## 2022-06-10 ENCOUNTER — HOSPITAL ENCOUNTER (OUTPATIENT)
Dept: CT IMAGING | Facility: HOSPITAL | Age: 32
Discharge: HOME | End: 2022-06-10
Payer: COMMERCIAL

## 2022-06-10 ENCOUNTER — NURSE TRIAGE (OUTPATIENT)
Dept: OTHER | Facility: OTHER | Age: 32
End: 2022-06-10

## 2022-06-10 ENCOUNTER — PROCEDURE VISIT (OUTPATIENT)
Dept: NEUROLOGY | Facility: CLINIC | Age: 32
End: 2022-06-10
Payer: COMMERCIAL

## 2022-06-10 VITALS — SYSTOLIC BLOOD PRESSURE: 131 MMHG | DIASTOLIC BLOOD PRESSURE: 77 MMHG | TEMPERATURE: 96.3 F | HEART RATE: 82 BPM

## 2022-06-10 DIAGNOSIS — G43.709 CHRONIC MIGRAINE WITHOUT AURA WITHOUT STATUS MIGRAINOSUS, NOT INTRACTABLE: Primary | ICD-10-CM

## 2022-06-10 DIAGNOSIS — N20.1 URETEROLITHIASIS: ICD-10-CM

## 2022-06-10 DIAGNOSIS — R11.2 NAUSEA & VOMITING: ICD-10-CM

## 2022-06-10 LAB — BACTERIA UR CULT: NORMAL

## 2022-06-10 PROCEDURE — 74176 CT ABD & PELVIS W/O CONTRAST: CPT

## 2022-06-10 PROCEDURE — G1004 CDSM NDSC: HCPCS

## 2022-06-10 PROCEDURE — 64615 CHEMODENERV MUSC MIGRAINE: CPT | Performed by: PHYSICIAN ASSISTANT

## 2022-06-10 RX ORDER — PROMETHAZINE HYDROCHLORIDE 25 MG/1
25 TABLET ORAL EVERY 6 HOURS PRN
Qty: 30 TABLET | Refills: 2 | Status: SHIPPED | OUTPATIENT
Start: 2022-06-10 | End: 2022-07-22 | Stop reason: SDUPTHER

## 2022-06-10 RX ORDER — PROMETHAZINE HYDROCHLORIDE 25 MG/1
25 SUPPOSITORY RECTAL EVERY 6 HOURS PRN
Qty: 12 SUPPOSITORY | Refills: 2 | Status: SHIPPED | OUTPATIENT
Start: 2022-06-10 | End: 2022-07-22 | Stop reason: SDUPTHER

## 2022-06-10 RX ORDER — TOPIRAMATE 100 MG/1
100 TABLET, FILM COATED ORAL 2 TIMES DAILY
Qty: 180 TABLET | Refills: 3 | Status: SHIPPED | OUTPATIENT
Start: 2022-06-10

## 2022-06-10 NOTE — PROGRESS NOTES
Universal Protocol   Consent: Verbal consent obtained  Written consent obtained  Risks and benefits: risks, benefits and alternatives were discussed  Consent given by: patient  Time out: Immediately prior to procedure a "time out" was called to verify the correct patient, procedure, equipment, support staff and site/side marked as required  Patient understanding: patient states understanding of the procedure being performed  Patient consent: the patient's understanding of the procedure matches consent given  Procedure consent: procedure consent matches procedure scheduled  Relevant documents: relevant documents present and verified  Patient identity confirmed: verbally with patient        Chemodenervation     Date/Time 6/10/2022 9:25 AM     Performed by  Dianna Zheng PA-C     Authorized by Dianna Zheng PA-C        Pre-procedure details      Prepped With: Alcohol     Anesthesia  (see MAR for exact dosages):      Anesthesia method:  None   Procedure details     Position:  Upright   Botox     Botox Type:  Type A    Brand:  Botox    mL's of Botulinum Toxin:  200    Final Concentration per CC:  50 units    Needle Gauge:  30 G 2 5 inch   Procedures     Botox Procedures: chronic headache      Indications: migraines     Injection Location      Head / Face:  L superior cervical paraspinal, R superior cervical paraspinal, L , R , L frontalis, R frontalis, L medial occipitalis, R medial occipitalis, procerus, R temporalis, L temporalis, R superior trapezius and L superior trapezius    L  injection amount:  5 unit(s)    R  injection amount:  5 unit(s)    L lateral frontalis:  5 unit(s)    R lateral frontalis:  5 unit(s)    L medial frontalis:  5 unit(s)    R medial frontalis:  5 unit(s)    L temporalis injection amount:  20 unit(s)    R temporalis injection amount:  20 unit(s)    Procerus injection amount:  5 unit(s)    L medial occipitalis injection amount:  15 unit(s)    R medial occipitalis injection amount:  15 unit(s)    L superior cervical paraspinal injection amount:  10 unit(s)    R superior cervical paraspinal injection amount:  10 unit(s)    L superior trapezius injection amount:  15 unit(s)    R superior trapezius injection amount:  15 unit(s)   Total Units     Total units used:  200    Total units discarded:  0   Post-procedure details      Chemodenervation:  Chronic migraine    Facial Nerve Location[de-identified]  Bilateral facial nerve    Patient tolerance of procedure:   Tolerated well, no immediate complications   Comments      5 units orbicularis oculi bilaterally   20 units frontalis   15 units temporalis   All medically necessary

## 2022-06-10 NOTE — TELEPHONE ENCOUNTER
Spoke with patient who is still experiencing nausea, vomiting and flank pain  Explained order is in the chart for CT and patient will call central scheduling to set up

## 2022-06-10 NOTE — TELEPHONE ENCOUNTER
Patient stated that she went to ER yesterday  Patient stated that Per ER physician, CT scan has to be ordered by the urologist  Patient called urology office to follow up, to schedule a CT scan

## 2022-06-10 NOTE — TELEPHONE ENCOUNTER
Reason for Disposition   Caller has URGENT question and triager unable to answer question    Answer Assessment - Initial Assessment Questions  1  SYMPTOM: "What's the main symptom you're concerned about?" (e g , pain, fever, vomiting)      Pain in left flank, nausea   2  ONSET: "When did the pain and nausea  start?"      6/1/22 after the cystoscopy w/ stent procedure    3  SURGERY: "What surgery was performed?"      Cystoscopy w/ stent   4  DATE of SURGERY: "When was surgery performed?"       6/8/22   6  PAIN: "Is there any pain?" If Yes, ask: "How bad is it?"  (Scale 1-10; or mild, moderate, severe)       4 out of 10   7  FEVER: "Do you have a fever?" If Yes, ask: "What is your temperature, how was it measured, and when did it start?"      No   8  VOMITING: "Is there any vomiting?" If yes, ask: "How many times? "      3-4 episodes of vomiting within last 24 hours   9  BLEEDING: "Is there any bleeding?" If Yes, ask: "How much?" and "Where?"       No   10   OTHER SYMPTOMS: "Do you have any other symptoms?" (e g , drainage from wound, painful urination, constipation)        No    Protocols used: POST-OP SYMPTOMS AND QUESTIONS-ADULT-OH

## 2022-06-13 NOTE — ED PROVIDER NOTES
History  Chief Complaint   Patient presents with    Vomiting     Recently dx with L kidney stone, stent placed 5/30  Reports vomiting, headache and palpitations since Tuesday  Denies fevers  28 yr female with recent left kidney stone- pulled left ureteral stent -- with recurrent headaches c/o 2 days of similar type recurrent headaches with n/v -all non bloody - no fevers/ gu/gyn comps- no abd pain -- no ill contacts- no other complaints       History provided by:  Patient and spouse   used: No        Prior to Admission Medications   Prescriptions Last Dose Informant Patient Reported? Taking? FLUoxetine (PROzac) 10 mg capsule  Self Yes No   Sig: Take 20 mg by mouth daily at bedtime   Rimegepant Sulfate (Nurtec) 75 MG TBDP   No No   Sig: Take 75 mg by mouth as needed (migraine)   SUMAtriptan (IMITREX) 100 mg tablet   No No   Sig: Take 1 tablet (100 mg total) by mouth as needed for migraine (migraine)   Ubrogepant (UBRELVY) 100 MG tablet   No No   Sig: Take 1 tablet (100 mg) one time as needed for migraine  May repeat one additional tablet (100 mg) at least two hours after the first dose  Do not use more than two doses per day, or for more than 10 days per month     amphetamine-dextroamphetamine (ADDERALL) 20 mg tablet  Self Yes No   Sig: Take 20 mg by mouth daily    azaTHIOprine (IMURAN) 50 mg tablet   No No   Sig: Take 1 tablet (50 mg total) by mouth 2 (two) times a day   buPROPion (WELLBUTRIN SR) 150 mg 12 hr tablet  Self Yes No   Sig: Take 150 mg by mouth 2 (two) times a day    dexamethasone (DECADRON) 1 mg tablet   No No   Sig: Take 1 tablet (1 mg total) by mouth as needed (migraine)   diazepam (VALIUM) 5 mg tablet  Self Yes No   Sig: Take 5 mg by mouth every 6 (six) hours as needed    famotidine (PEPCID) 20 mg tablet   No No   Sig: Take 1 tablet (20 mg total) by mouth daily   hydroxychloroquine (PLAQUENIL) 200 mg tablet   No No   Sig: Take 1 5 tablets (300 mg total) by mouth daily ibuprofen (MOTRIN) 600 mg tablet   No No   Sig: Take 1 tablet (600 mg total) by mouth every 6 (six) hours as needed for mild pain or moderate pain   ketorolac (TORADOL) 10 mg tablet   No No   Sig: Take 1 tablet (10 mg total) by mouth every 6 (six) hours as needed for moderate pain   lamoTRIgine (LaMICtal) 150 MG tablet   Yes No   Sig: Take 150 mg by mouth daily    levonorgestrel (KYLEENA) 19 5 MG intrauterine device  Self Yes No   Si Intra Uterine Device by Intrauterine route once   levothyroxine 100 mcg tablet   No No   Sig: TAKE ONE TABLET BY MOUTH ONCE DAILY   meloxicam (Mobic) 15 mg tablet   No No   Sig: Take 1 tablet (15 mg total) by mouth daily   mupirocin (BACTROBAN) 2 % ointment   No No   Sig: Apply topically 3 (three) times a day   Patient taking differently: Apply 1 application topically 3 (three) times a day as needed   ondansetron (ZOFRAN-ODT) 4 mg disintegrating tablet   No No   Sig: Take 1 tablet (4 mg total) by mouth every 8 (eight) hours as needed for nausea or vomiting for up to 7 days   oxyCODONE-acetaminophen (PERCOCET) 5-325 mg per tablet   No No   Sig: Take 1 tablet by mouth every 6 (six) hours as needed for severe pain for up to 10 days Max Daily Amount: 4 tablets   oxybutynin (DITROPAN) 5 mg tablet   No No   Sig: Take 1 tablet (5 mg total) by mouth 3 (three) times a day as needed (bladder spasms) for up to 10 days   polyethylene glycol (MIRALAX) 17 g packet  Self Yes No   Sig: Take 17 g by mouth daily   prochlorperazine (COMPAZINE) 10 mg tablet   No No   Sig: Take 1 tablet (10 mg total) by mouth every 6 (six) hours as needed (migraine)   tiZANidine (ZANAFLEX) 2 mg tablet   No No   Sig: Take 1 tablet (2 mg total) by mouth every 8 (eight) hours as needed for muscle spasms      Facility-Administered Medications: None       Past Medical History:   Diagnosis Date    Anxiety     Depression     Encounter for IUD removal and reinsertion 2019    Lupus (HCC)     Migraines     Ovarian cyst, right     PONV (postoperative nausea and vomiting)     Stress fracture, right foot, initial encounter for fracture        Past Surgical History:   Procedure Laterality Date    COLONOSCOPY      FL RETROGRADE PYELOGRAM  6/2/2022    INGUINAL HERNIA REPAIR  10/1998    WA CYSTO/URETERO W/LITHOTRIPSY &INDWELL STENT INSRT Left 6/2/2022    Procedure: CYSTOSCOPY,URETEROSCOPY, RETROGRADE PYELOGRAM AND INSERTION STENT URETERAL;  Surgeon: Victor M Lagunas MD;  Location[facility];  Service: Urology    REDUCTION MAMMAPLASTY  03/2013    TONSILLECTOMY  06/2011    TONSILLECTOMY      UPPER GASTROINTESTINAL ENDOSCOPY      WISDOM TOOTH EXTRACTION         Family History   Problem Relation Age of Onset    Colon cancer Mother     Arthritis Mother     Thyroid disease Mother     Depression Mother     Anxiety disorder Mother     Clotting disorder Mother     Hyperlipidemia Mother     Vesicoureteral reflux Mother     Irritable bowel syndrome Mother     Migraines Mother     Skin cancer Mother     Thyroid disease unspecified Mother     Heart disease Father     Hypertension Father     Arthritis Father     Hyperlipidemia Father     Vesicoureteral reflux Father     Hypertension Brother     Hyperlipidemia Brother     COPD Maternal Grandmother     Stroke Maternal Grandmother     Lung cancer Maternal Grandmother     Arthritis Maternal Grandmother     Asthma Maternal Grandmother     Hyperlipidemia Maternal Grandmother     Vesicoureteral reflux Maternal Grandmother     Heart disease Maternal Grandmother     Hypertension Maternal Grandmother     Migraines Maternal Grandmother     Osteoporosis Maternal Grandmother     Heart attack Maternal Grandfather     Heart disease Maternal Grandfather     Diabetes Maternal Grandfather     Heart disease Paternal Grandmother     Hyperlipidemia Paternal Grandmother     Diabetes Paternal Grandfather     Kidney disease Maternal Aunt     Migraines Maternal Aunt     Heart attack Maternal Uncle     Heart disease Maternal Uncle     Melanoma Maternal Uncle     Lupus Cousin      I have reviewed and agree with the history as documented  E-Cigarette/Vaping    E-Cigarette Use Never User      E-Cigarette/Vaping Substances    Nicotine No     THC No     CBD No     Flavoring No     Other No     Unknown No      Social History     Tobacco Use    Smoking status: Never Smoker    Smokeless tobacco: Never Used   Vaping Use    Vaping Use: Never used   Substance Use Topics    Alcohol use: Yes     Comment: social    Drug use: Never       Review of Systems   Constitutional: Positive for appetite change  Negative for activity change, chills, diaphoresis, fatigue, fever and unexpected weight change  HENT: Negative  Eyes: Negative  Respiratory: Negative  Cardiovascular: Negative  Gastrointestinal: Positive for nausea and vomiting  Negative for abdominal distention, abdominal pain, anal bleeding, blood in stool, constipation, diarrhea and rectal pain  Endocrine: Negative  Genitourinary: Negative  Musculoskeletal: Negative  Skin: Negative  Allergic/Immunologic: Negative  Neurological: Positive for headaches  Negative for dizziness, tremors, seizures, syncope, facial asymmetry, speech difficulty, weakness, light-headedness and numbness  Hematological: Negative  Psychiatric/Behavioral: Negative  Physical Exam  Physical Exam  Vitals and nursing note reviewed  Constitutional:       General: She is not in acute distress  Appearance: Normal appearance  She is not ill-appearing, toxic-appearing or diaphoretic  Comments: avss-- pulse ox 99 % on ra- interpretation is normal- no intervention-    HENT:      Head: Normocephalic and atraumatic  Nose: Nose normal       Mouth/Throat:      Mouth: Mucous membranes are moist    Eyes:      General: No scleral icterus  Right eye: No discharge  Left eye: No discharge  Extraocular Movements: Extraocular movements intact  Conjunctiva/sclera: Conjunctivae normal       Pupils: Pupils are equal, round, and reactive to light  Comments: Mm pink   Neck:      Vascular: No carotid bruit  Comments: No pmt c/t/l/s spine- no meningeal signs   Cardiovascular:      Rate and Rhythm: Normal rate and regular rhythm  Pulses: Normal pulses  Heart sounds: Normal heart sounds  No murmur heard  No friction rub  No gallop  Pulmonary:      Effort: Pulmonary effort is normal  No respiratory distress  Breath sounds: Normal breath sounds  No stridor  No wheezing, rhonchi or rales  Chest:      Chest wall: No tenderness  Abdominal:      General: Bowel sounds are normal  There is no distension  Palpations: Abdomen is soft  There is no mass  Tenderness: There is no abdominal tenderness  There is no right CVA tenderness, left CVA tenderness, guarding or rebound  Hernia: No hernia is present  Comments: Soft nt/nd- no hsm- no cva tenderness- no peritoneal signs- no ascites    Musculoskeletal:         General: No swelling, tenderness, deformity or signs of injury  Normal range of motion  Cervical back: Normal range of motion and neck supple  No rigidity or tenderness  Right lower leg: No edema  Left lower leg: No edema  Comments: Equal bilateral radial/dp pulses- no ble edema/calf tenderness/asym/ erythema   Lymphadenopathy:      Cervical: No cervical adenopathy  Skin:     General: Skin is warm  Capillary Refill: Capillary refill takes less than 2 seconds  Coloration: Skin is not jaundiced or pale  Findings: No bruising, erythema, lesion or rash  Neurological:      General: No focal deficit present  Mental Status: She is alert and oriented to person, place, and time  Mental status is at baseline  Cranial Nerves: No cranial nerve deficit  Sensory: No sensory deficit  Motor: No weakness  Coordination: Coordination normal       Gait: Gait normal       Comments: Normal non focal neuro exam    Psychiatric:         Mood and Affect: Mood normal          Behavior: Behavior normal          Thought Content:  Thought content normal          Judgment: Judgment normal          Vital Signs  ED Triage Vitals   Temperature Pulse Respirations Blood Pressure SpO2   06/09/22 0758 06/09/22 0758 06/09/22 0758 06/09/22 0758 06/09/22 0758   97 6 °F (36 4 °C) 95 18 142/87 99 %      Temp Source Heart Rate Source Patient Position - Orthostatic VS BP Location FiO2 (%)   06/09/22 0758 06/09/22 0758 06/09/22 0758 06/09/22 0758 --   Oral Monitor Sitting Right arm       Pain Score       06/09/22 1057       5           Vitals:    06/09/22 0930 06/09/22 1000 06/09/22 1030 06/09/22 1200   BP: 112/72 115/75 109/74 104/67   Pulse: 86 82 80 86   Patient Position - Orthostatic VS: Lying Lying           Visual Acuity      ED Medications  Medications   metoclopramide (REGLAN) injection 10 mg (10 mg Intravenous Given 6/9/22 0846)   diphenhydrAMINE (BENADRYL) injection 25 mg (25 mg Intravenous Given 6/9/22 0844)   ketorolac (TORADOL) injection 9 9 mg (9 9 mg Intravenous Given 6/9/22 0843)   magnesium sulfate 2 g/50 mL IVPB (premix) 2 g (0 g Intravenous Stopped 6/9/22 0956)   acetaminophen (TYLENOL) tablet 650 mg (650 mg Oral Given 6/9/22 1115)       Diagnostic Studies  Results Reviewed     Procedure Component Value Units Date/Time    Urine culture [[id number]] Collected: 06/09/22 1041    Lab Status: Final result Specimen: Urine Updated: 06/10/22 1111     Urine Culture <10,000 cfu/ml     Urine Microscopic [[id number]]  (Abnormal) Collected: 06/09/22 0832    Lab Status: Final result Specimen: Urine, Clean Catch Updated: 06/09/22 0943     RBC, UA 4-10 /hpf      WBC, UA 2-4 /hpf      Epithelial Cells Occasional /hpf      Bacteria, UA Moderate /hpf      Hyaline Casts, UA 0-1 /lpf      MUCUS THREADS Occasional    UA (URINE) with reflex to Scope [[id number]]  (Abnormal) Collected: 06/09/22 0832    Lab Status: Final result Specimen: Urine, Clean Catch Updated: 06/09/22 0918     Color, UA Yellow     Clarity, UA Clear     Specific Gravity, UA >=1 030     pH, UA 6 0     Leukocytes, UA Negative     Nitrite, UA Negative     Protein, UA Trace mg/dl      Glucose, UA Negative mg/dl      Ketones, UA >=80 (3+) mg/dl      Urobilinogen, UA 0 2 E U /dl      Bilirubin, UA Small     Blood, UA Moderate    Basic metabolic panel [[id number]] Collected: 06/09/22 0841    Lab Status: Final result Specimen: Blood from Arm, Right Updated: 06/09/22 0913     Sodium 139 mmol/L      Potassium 3 6 mmol/L      Chloride 105 mmol/L      CO2 22 mmol/L      ANION GAP 12 mmol/L      BUN 24 mg/dL      Creatinine 0 79 mg/dL      Glucose 98 mg/dL      Calcium 9 6 mg/dL      eGFR 99 ml/min/1 73sq m     Narrative:      National Kid[employer] Foundation guidelines for Chronic Kidney Disease (CKD):     Stage 1 with normal or high GFR (GFR > 90 mL/min/1 73 square meters)    Stage 2 Mild CKD (GFR = 60-89 mL/min/1 73 square meters)    Stage 3A Moderate CKD (GFR = 45-59 mL/min/1 73 square meters)    Stage 3B Moderate CKD (GFR = 30-44 mL/min/1 73 square meters)    Stage 4 Severe CKD (GFR = 15-29 mL/min/1 73 square meters)    Stage 5 End Stage CKD (GFR <15 mL/min/1 73 square meters)  Note: GFR calculation is accurate only with a steady state creatinine    CBC and differential [[id number]] Collected: 06/09/22 0841    Lab Status: Final result Specimen: Blood from Arm, Right Updated: 06/09/22 0903     WBC 9 39 Thousand/uL      RBC 4 56 Million/uL      Hemoglobin 14 8 g/dL      Hematocrit 40 9 %      MCV 90 fL      MCH 32 5 pg      MCHC 36 2 g/dL      RDW 11 9 %      MPV 11 6 fL      Platelets 516 Thousands/uL      nRBC 0 /100 WBCs      Neutrophils Relative 73 %      Immat GRANS % 1 %      Lymphocytes Relative 17 %      Monocytes Relative 6 %      Eosinophils Relative 2 %      Basophils Relative 1 % Neutrophils Absolute 6 95 Thousands/µL      Immature Grans Absolute 0 05 Thousand/uL      Lymphocytes Absolute 1 63 Thousands/µL      Monocytes Absolute 0 56 Thousand/µL      Eosinophils Absolute 0 14 Thousand/µL      Basophils Absolute 0 06 Thousands/µL                  No orders to display              Procedures  Procedures         ED Course  ED Course as of 06/13/22 1154   Thu Jun 09, 2022   0959 - ER MD NOTE-   PT RE-EVALUATED- FEELS IMPROVED WITH NAUSEA-- STILL WITH HEADACHE-  WILL START PO CHALLENGE AND CONTOIUE TO RE-EVAL IN ER    1201 Er md note- pt- re-evaluated- feels improved- recurrent headache improved- nausea improved- tolerating liquids in er wants to go home with reglan rx-                                             MDM    Disposition  Final diagnoses:   Recurrent headache   Nausea & vomiting     Time reflects when diagnosis was documented in both MDM as applicable and the Disposition within this note     Time User Action Codes Description Comment    6/9/2022 12:03 PM Celeste Plants D Add [R51 9] Recurrent headache     6/9/2022 12:03 PM Sylvia Ivett Add [R11 2] Nausea & vomiting       ED Disposition     ED Disposition   Discharge    Condition   Stable    Date/Time   Thu Jun 9, 2022 12:03 PM    Comment   Maria Isabel Goodman discharge to home/self care                 Follow-up Information    None         Discharge Medication List as of 6/9/2022 12:08 PM      START taking these medications    Details   metoclopramide (Reglan) 10 mg tablet Take 1 tablet (10 mg total) by mouth 3 (three) times a day as needed (nausea/ vomiting) for up to 20 doses, Starting u 6/9/2022, Print         CONTINUE these medications which have NOT CHANGED    Details   amphetamine-dextroamphetamine (ADDERALL) 20 mg tablet Take 20 mg by mouth daily , Starting Tue 11/5/2019, Historical Med      azaTHIOprine (IMURAN) 50 mg tablet Take 1 tablet (50 mg total) by mouth 2 (two) times a day, Starting Wed 3/2/2022, Normal      buPROPion [facility] SR) 150 mg 12 hr tablet Take 150 mg by mouth 2 (two) times a day , Starting Mon 4/8/2019, Historical Med      dexamethasone (DECADRON) 1 mg tablet Take 1 tablet (1 mg total) by mouth as needed (migraine), Starting Thu 5/5/2022, Normal      diazepam (VALIUM) 5 mg tablet Take 5 mg by mouth every 6 (six) hours as needed , Starting Sat 4/20/2019, Historical Med      famotidine (PEPCID) 20 mg tablet Take 1 tablet (20 mg total) by mouth daily, Starting Fri 6/3/2022, Normal      FLUoxetine (PROzac) 10 mg capsule Take 20 mg by mouth daily at bedtime, Starting Mon 12/23/2019, Historical Med      hydroxychloroquine (PLAQUENIL) 200 mg tablet Take 1 5 tablets (300 mg total) by mouth daily, Starting Fri 2/4/2022, Until Wed 8/3/2022, Normal      ibuprofen (MOTRIN) 600 mg tablet Take 1 tablet (600 mg total) by mouth every 6 (six) hours as needed for mild pain or moderate pain, Starting Mon 5/30/2022, Normal      ketorolac (TORADOL) 10 mg tablet Take 1 tablet (10 mg total) by mouth every 6 (six) hours as needed for moderate pain, Starting Thu 5/5/2022, Normal      lamoTRIgine (LaMICtal) 150 MG tablet Take 150 mg by mouth daily , Starting Thu 4/1/2021, Historical Med      levonorgestrel (KYLEENA) 19 5 MG intrauterine device 1 Intra Uterine Device by Intrauterine route once, Starting Wed 5/29/2019, Historical Med      levothyroxine 100 mcg tablet TAKE ONE TABLET BY MOUTH ONCE DAILY, Normal      meloxicam (Mobic) 15 mg tablet Take 1 tablet (15 mg total) by mouth daily, Starting Fri 2/4/2022, Normal      mupirocin (BACTROBAN) 2 % ointment Apply topically 3 (three) times a day, Starting Tue 1/26/2021, Normal      ondansetron (ZOFRAN-ODT) 4 mg disintegrating tablet Take 1 tablet (4 mg total) by mouth every 8 (eight) hours as needed for nausea or vomiting for up to 7 days, Starting Mon 5/30/2022, Until Mon 6/6/2022 at 2359, Print      oxybutynin (DITROPAN) 5 mg tablet Take 1 tablet (5 mg total) by mouth 3 (three) times a day as needed (bladder spasms) for up to 10 days, Starting Fri 6/3/2022, Until Mon 6/13/2022 at 2359, Normal      oxyCODONE-acetaminophen (PERCOCET) 5-325 mg per tablet Take 1 tablet by mouth every 6 (six) hours as needed for severe pain for up to 10 days Max Daily Amount: 4 tablets, Starting Mon 5/30/2022, Until Thu 6/9/2022 at 2359, Print      polyethylene glycol (MIRALAX) 17 g packet Take 17 g by mouth daily, Historical Med      prochlorperazine (COMPAZINE) 10 mg tablet Take 1 tablet (10 mg total) by mouth every 6 (six) hours as needed (migraine), Starting Thu 5/5/2022, Normal      Rimegepant Sulfate (Nurtec) 75 MG TBDP Take 75 mg by mouth as needed (migraine), Starting Fri 1/8/2021, Normal      SUMAtriptan (IMITREX) 100 mg tablet Take 1 tablet (100 mg total) by mouth as needed for migraine (migraine), Starting u 5/5/2022, Normal      tiZANidine (ZANAFLEX) 2 mg tablet Take 1 tablet (2 mg total) by mouth every 8 (eight) hours as needed for muscle spasms, Starting Fri 2/4/2022, Normal      Ubrogepant (UBRELVY) 100 MG tablet Take 1 tablet (100 mg) one time as needed for migraine  May repeat one additional tablet (100 mg) at least two hours after the first dose  Do not use more than two doses per day, or for more than 10 days per month , Normal      topiramate (TOPAMAX) 50 MG tablet 1 tab in the am and 2 tabs at bedtime, Normal             No discharge procedures on file      PDMP Review     None          ED Provider  Electronically Signed by           Ian Koroma MD  06/13/22 4547

## 2022-06-23 NOTE — PROGRESS NOTES
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  Was having a lot of kidney stones lately, has lost weight; currently feeling better  Overall, feels her lupus is better controlled with the addition of azathioprine  Explained to patient that I would love to continue to follow her, but if it would be easier for her to transfer care to [facility] due to becoming a [facility] [facility] employee soon, that is understandable  Can take famotidine 1-2 times a day or PPI for GI prophylaxis while on regular NSAIDs  Continue meloxicam daily as needed for joint pain  Continue 200mg po daily hydroxychloroquine and regular eye exams  Continue azathioprine 50mg po bid  Do lab    Return to clinic in 6 months    Plan:  Diagnoses and all orders for this visit:    Systemic lupus erythematosus with other organ involvement, unspecified SLE type ([facility] [address] )  -     azaTHIOprine (IMURAN) 50 mg tablet; Take 1 tablet (50 mg total) by mouth 2 (two) times a day    Undifferentiated connective tissue disease (HCC)  -     hydroxychloroquine (PLAQUENIL) 200 mg tablet; Take 1 tablet (200 mg total) by mouth daily    Dysphagia, unspecified type  -     omeprazole (PriLOSEC) 20 mg delayed release capsule; Take 1 capsule (20 mg total) by mouth daily    Arthralgia, unspecified joint  -     meloxicam (Mobic) 15 mg tablet;  Take 1 tablet (15 mg total) by mouth daily    High risk medication use  -     TPMT ENZYME ACTIVITY,BLOOD   High risk medication use - Benefits and risks of hydroxychloroquine, including but not limited to retinal toxicity, corneal deposits, gastrointestinal side effects, and headaches were previously discussed with the patient  She is aware of the need for a regular eye exam to monitor for ocular toxicity while on this medication  Benefits and risks of azathioprine use, including but not limited to gastrointestinal disturbances such as nausea, diarrhea, leukopenia, lymphoma, and hepatotoxicity were discussed with the patient  CBC, CMP will be regularly monitored  TPMT enzyme activity also ordered to rule-out the possibility patient is a slow metabolizer of the medication  Follow-up plan: Return to clinic in 6 months        Rheumatic Disease Summary  Maria Isabel Cage Monday is a 28 y o  female who originally presented on 8/28/20 as a Rheumatology consult referred by her PCP Noa Sheridan PA-C for evaluation of possible autoimmune disease  Patient admitted to fevers, photosensitivity, arthralgia, myalgia, fatigue, mouth/nasal sores, sicca symptoms, Raynaud's symptoms, headaches, and malar erythema  Extensive autoimmune disease lab and x-ray work-up ordered and returned unremarkable  Prescribed celecoxib 100mg po bid prn joint pain  Can also prescribe prednisone course to see if symptoms improve; if they do, then there likely is an inflammatory process going on  Next clinic visit was 10/9/20  It was felt that she had UCTD at the time  Also on differential was seronegative RA with sicca symptoms  Muscle enzymes and ACE level ordered to evaluate for myositis and sarcoidosis returned normal  Started patient on the DMARD hydroxychloroquine 200mg po daily; she was made aware to get regular eye exams while on this medication  She was to continue celecoxib 200mg po bid prn for joint pain  Put patient on a slower prednisone taper starting at 20mg po daily and to decrease by 2 5mg increments every week until off     3/12/21: Patient has developed hair loss and low-grade fevers in addition to her regular symptoms  Increased patient's HCQ to 300mg po daily  Started her on naproxen 500mg po bid prn for joint pain   She can take famotidine daily if she plans on taking naproxen daily  Her lupus activity labs returned unremarkable, but an DEBBY with IFA was also ordered this visit, and returned positive at 1:640 in a speckled pattern, so she likely does have SLE      6/18/21: Has a lot of fatigue; asked patient to exercise daily and take daily Vit  D 1,000-2,000 units a day  Continue famotidine daily for GI prophylaxis while on regular NSAIDs  Continue diclofenac twice a day as needed for joint pain  Continue 300mg po daily hydroxychloroquine and regular eye exams; last eye exam was in Feb 2021 and was normal    11/22/21:Her lupus symptoms seemed to be more active lately , especially had been having a lot of 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  Could take famotidine 1-2 times a day for GI prophylaxis while on regular NSAIDs  Stopped diclofenac since was no longer helpful for joint pain; started meloxicam daily as needed for joint pain  Continued 300mg po daily hydroxychloroquine and regular eye exams  Started sulfasalazine to ultimately increase to the therapeutic dose of 1,000mg po bid  Take prednisone 2 5mg po daily for a week to calm down her currently active symptoms  EBV panel ordered to check for mono as cause of her fatigue    2/4/22:Patient had worsening fatigue and general weakness in her body after she had COVID19 infection in beginning of January 2022  She was provided with course of prednisone 1 week of 20 mg, 1 week of 10 mg  Apparantly she was complaining of fatigue since summer  She continues to take vit D supplement that does not help with fatigue  Has history of IgA deficency  She had frequent sinus infections till the age of 22 but denies recent infections  May consider official allergy/immunology evaluation if suspicion for immunodeficiency  She continues to take hydroxychloroquine 200 mg daily and sulfasalazine 500 mg 2 tablets twice daily   Patient gets trigger point injections for her back pain  Muscle relaxant was not helping her, I will discontinue that  Patient was recommended to do EBV test to rule-out mono as contributing to her fatigue  Continue sulfasalazine 2 tabs twice a day, hydroxychloroquine one and a half tabs daily, meloxicam daily as needed, famotidine twice a day as needed  Start tizanidine three times a day as needed for muscle spasm  Ophthalmology referral made  VON Gonsalves is a 28 y o   female who presents for follow-up of her newly confirmed SLE  Last clinic visit was 2/4/22  Patient was having a lot of kidney stones lately, but currently is stable  Is transferring jobs  Overall, feels a lot better since adding on azathioprine to her medication regimen  The following portions of the patient's history were reviewed and updated as appropriate: allergies, current medications, past family history, past medical history, past social history, past surgical history and problem list     Review of Systems:   Review of Systems   Constitutional: Negative for fatigue  HENT: Negative for mouth sores  Eyes: Negative for pain  Respiratory: Negative for shortness of breath  Cardiovascular: Negative for leg swelling  Musculoskeletal: Negative for arthralgias and joint swelling  Skin: Negative for rash  Neurological: Negative for weakness  Hematological: Negative for adenopathy  Psychiatric/Behavioral: Negative for sleep disturbance         Home Medications:    Current Outpatient Medications:     amphetamine-dextroamphetamine (ADDERALL) 20 mg tablet, Take 20 mg by mouth daily , Disp: , Rfl: 0    azaTHIOprine (IMURAN) 50 mg tablet, Take 1 tablet (50 mg total) by mouth 2 (two) times a day, Disp: 180 tablet, Rfl: 1    buPROPion (WELLBUTRIN SR) 150 mg 12 hr tablet, Take 150 mg by mouth 2 (two) times a day , Disp: , Rfl:     dexamethasone (DECADRON) 1 mg tablet, Take 1 tablet (1 mg total) by mouth as needed (migraine), Disp: 10 tablet, Rfl: 0    diazepam (VALIUM) 5 mg tablet, Take 5 mg by mouth every 6 (six) hours as needed , Disp: , Rfl:     FLUoxetine (PROzac) 10 mg capsule, Take 20 mg by mouth daily at bedtime, Disp: , Rfl:     hydroxychloroquine (PLAQUENIL) 200 mg tablet, Take 1 tablet (200 mg total) by mouth daily, Disp: 90 tablet, Rfl: 1    ibuprofen (MOTRIN) 600 mg tablet, Take 1 tablet (600 mg total) by mouth every 6 (six) hours as needed for mild pain or moderate pain, Disp: 30 tablet, Rfl: 0    ketorolac (TORADOL) 10 mg tablet, Take 1 tablet (10 mg total) by mouth every 6 (six) hours as needed for moderate pain, Disp: 10 tablet, Rfl: 0    lamoTRIgine (LaMICtal) 150 MG tablet, Take 150 mg by mouth daily , Disp: , Rfl:     levonorgestrel (KYLEENA) 19 5 MG intrauterine device, 1 Intra Uterine Device by Intrauterine route once, Disp: , Rfl:     levothyroxine 100 mcg tablet, TAKE ONE TABLET BY MOUTH ONCE DAILY, Disp: 90 tablet, Rfl: 3    meloxicam (Mobic) 15 mg tablet, Take 1 tablet (15 mg total) by mouth daily, Disp: 90 tablet, Rfl: 1    metoclopramide (Reglan) 10 mg tablet, Take 1 tablet (10 mg total) by mouth 3 (three) times a day as needed (nausea/ vomiting) for up to 20 doses, Disp: 20 tablet, Rfl: 3    mupirocin (BACTROBAN) 2 % ointment, Apply topically 3 (three) times a day (Patient taking differently: Apply 1 application topically 3 (three) times a day as needed), Disp: 30 g, Rfl: 5    omeprazole (PriLOSEC) 20 mg delayed release capsule, Take 1 capsule (20 mg total) by mouth daily, Disp: 30 capsule, Rfl: 6    ondansetron (ZOFRAN-ODT) 4 mg disintegrating tablet, Take 1 tablet (4 mg total) by mouth every 8 (eight) hours as needed for nausea or vomiting for up to 7 days, Disp: 20 tablet, Rfl: 0    oxybutynin (DITROPAN) 5 mg tablet, Take 1 tablet (5 mg total) by mouth 3 (three) times a day as needed (bladder spasms) for up to 10 days, Disp: 30 tablet, Rfl: 0    polyethylene glycol (MIRALAX) 17 g packet, Take 17 g by mouth daily, Disp: , Rfl:    prochlorperazine (COMPAZINE) 10 mg tablet, Take 1 tablet (10 mg total) by mouth every 6 (six) hours as needed (migraine), Disp: 10 tablet, Rfl: 0    promethazine (PHENERGAN) 25 mg suppository, Insert 1 suppository (25 mg total) into the rectum every 6 (six) hours as needed for nausea or vomiting, Disp: 12 suppository, Rfl: 2    promethazine (PHENERGAN) 25 mg tablet, Take 1 tablet (25 mg total) by mouth every 6 (six) hours as needed for nausea or vomiting, Disp: 30 tablet, Rfl: 2    Rimegepant Sulfate (Nurtec) 75 MG TBDP, Take 75 mg by mouth as needed (migraine), Disp: 8 tablet, Rfl: 3    SUMAtriptan (IMITREX) 100 mg tablet, Take 1 tablet (100 mg total) by mouth as needed for migraine (migraine), Disp: 27 tablet, Rfl: 0    topiramate (TOPAMAX) 100 mg tablet, Take 1 tablet (100 mg total) by mouth 2 (two) times a day, Disp: 180 tablet, Rfl: 3    Ubrogepant (UBRELVY) 100 MG tablet, Take 1 tablet (100 mg) one time as needed for migraine  May repeat one additional tablet (100 mg) at least two hours after the first dose  Do not use more than two doses per day, or for more than 10 days per month , Disp: 10 tablet, Rfl: 3    Objective:    Vitals:    06/24/22 1508   BP: 108/72   Pulse: 101   Temp: (!) 97 2 °F (36 2 °C)   SpO2: 99%   Weight: 52 2 kg (115 lb)   Height: 5' (1 524 m)       Physical Exam  Constitutional:       General: She is not in acute distress  HENT:      Head: Normocephalic and atraumatic  Eyes:      Conjunctiva/sclera: Conjunctivae normal    Cardiovascular:      Rate and Rhythm: Normal rate and regular rhythm  Heart sounds: S1 normal and S2 normal      No friction rub  Pulmonary:      Effort: Pulmonary effort is normal  No respiratory distress  Breath sounds: Normal breath sounds  No wheezing, rhonchi or rales  Musculoskeletal:      Cervical back: Neck supple  Skin:     Coloration: Skin is not pale  Neurological:      Mental Status: She is alert  Mental status is at baseline  Psychiatric:         Mood and Affect: Mood normal          Behavior: Behavior normal        Reviewed labs and imaging  Imaging:   CT abdomen pelvis 6/10/22  No hydroureteronephrosis identified  Left ureteral calculi no longer identified  Previously seen intrarenal calculi are still present  MRI cervical spine 4/8/22  No evidence of demyelinating lesion or abnormal enhancement in cervical spine  Multilevel degenerative changes of cervical spine with multilevel mild canal stenosis (worse at C5-C6 and C6-C7), as detailed above  No significant foraminal narrowing  XR right foot 11/29/21  No acute osseous abnormality  XR right ankle 10/18/21  No acute osseous abnormality  XR right foot 10/18/21  No acute osseous abnormality  CXR 7/27/21  No acute cardiopulmonary disease      Bilateral Hand and SI joint x-rays 8/28/20  unremarkable    Labs:   Admission on 06/09/2022, Discharged on 06/09/2022   Component Date Value Ref Range Status    WBC 06/09/2022 9 39  4 31 - 10 16 Thousand/uL Final    RBC 06/09/2022 4 56  3 81 - 5 12 Million/uL Final    Hemoglobin 06/09/2022 14 8  11 5 - 15 4 g/dL Final    Hematocrit 06/09/2022 40 9  34 8 - 46 1 % Final    MCV 06/09/2022 90  82 - 98 fL Final    MCH 06/09/2022 32 5  26 8 - 34 3 pg Final    MCHC 06/09/2022 36 2  31 4 - 37 4 g/dL Final    RDW 06/09/2022 11 9  11 6 - 15 1 % Final    MPV 06/09/2022 11 6  8 9 - 12 7 fL Final    Platelets 27/84/5512 360  149 - 390 Thousands/uL Final    nRBC 06/09/2022 0  /100 WBCs Final    Neutrophils Relative 06/09/2022 73  43 - 75 % Final    Immat GRANS % 06/09/2022 1  0 - 2 % Final    Lymphocytes Relative 06/09/2022 17  14 - 44 % Final    Monocytes Relative 06/09/2022 6  4 - 12 % Final    Eosinophils Relative 06/09/2022 2  0 - 6 % Final    Basophils Relative 06/09/2022 1  0 - 1 % Final    Neutrophils Absolute 06/09/2022 6 95  1 85 - 7 62 Thousands/µL Final    Immature Grans Absolute 06/09/2022 0 05  0 00 - 0 20 Thousand/uL Final    Lymphocytes Absolute 06/09/2022 1 63  0 60 - 4 47 Thousands/µL Final    Monocytes Absolute 06/09/2022 0 56  0 17 - 1 22 Thousand/µL Final    Eosinophils Absolute 06/09/2022 0 14  0 00 - 0 61 Thousand/µL Final    Basophils Absolute 06/09/2022 0 06  0 00 - 0 10 Thousands/µL Final    Sodium 06/09/2022 139  135 - 147 mmol/L Final    Potassium 06/09/2022 3 6  3 5 - 5 3 mmol/L Final    Chloride 06/09/2022 105  96 - 108 mmol/L Final    CO2 06/09/2022 22  21 - 32 mmol/L Final    ANION GAP 06/09/2022 12  4 - 13 mmol/L Final    BUN 06/09/2022 24  5 - 25 mg/dL Final    Creatinine 06/09/2022 0 79  0 60 - 1 30 mg/dL Final    Standardized to IDMS reference method    Glucose 06/09/2022 98  65 - 140 mg/dL Final    If the patient is fasting, the ADA then defines impaired fasting glucose as > 100 mg/dL and diabetes as > or equal to 123 mg/dL  Specimen collection should occur prior to Sulfasalazine administration due to the potential for falsely depressed results  Specimen collection should occur prior to Sulfapyridine administration due to the potential for falsely elevated results      Calcium 06/09/2022 9 6  8 4 - 10 2 mg/dL Final    eGFR 06/09/2022 99  ml/min/1 73sq m Final    Color, UA 06/09/2022 Yellow   Final    Clarity, UA 06/09/2022 Clear   Final    Specific Gravity, UA 06/09/2022 >=1 030  1 003 - 1 030 Final    pH, UA 06/09/2022 6 0  4 5, 5 0, 5 5, 6 0, 6 5, 7 0, 7 5, 8 0 Final    Leukocytes, UA 06/09/2022 Negative  Negative Final    Nitrite, UA 06/09/2022 Negative  Negative Final    Protein, UA 06/09/2022 Trace (A) Negative mg/dl Final    Glucose, UA 06/09/2022 Negative  Negative mg/dl Final    Ketones, UA 06/09/2022 >=80 (3+) (A) Negative mg/dl Final    Urobilinogen, UA 06/09/2022 0 2  0 2, 1 0 E U /dl E U /dl Final    Bilirubin, UA 06/09/2022 Small (A) Negative Final    Occult Blood, UA 06/09/2022 Moderate (A) Negative Final    RBC, UA 06/09/2022 4-10 (A) None Seen, 0-1, 1-2, 2-4, 0-5 /hpf Final    WBC, UA 06/09/2022 2-4  None Seen, 0-1, 1-2, 0-5, 2-4 /hpf Final    Epithelial Cells 06/09/2022 Occasional  None Seen, Occasional /hpf Final    Bacteria, UA 06/09/2022 Moderate (A) None Seen, Occasional /hpf Final    Hyaline Casts, UA 06/09/2022 0-1 (A) (none) /lpf Final    MUCUS THREADS 06/09/2022 Occasional (A) None Seen Final    Urine Culture 06/09/2022 <10,000 cfu/ml    Final    Mixed Contaminants X2   Admission on 06/01/2022, Discharged on 06/03/2022   Component Date Value Ref Range Status    Color, UA 06/01/2022 Yellow   Final    Clarity, UA 06/01/2022 Clear   Final    Specific [place], UA 06/01/2022 1 015  1 003 - 1 030 Final    pH, UA 06/01/2022 8 0  4 5, 5 0, 5 5, 6 0, 6 5, 7 0, 7 5, 8 0 Final    Leukocytes, UA 06/01/2022 Negative  Negative Final    Nitrite, UA 06/01/2022 Negative  Negative Final    Protein, UA 06/01/2022 Negative  Negative mg/dl Final    Glucose, UA 06/01/2022 Negative  Negative mg/dl Final    Ketones, UA 06/01/2022 Negative  Negative mg/dl Final    Urobilinogen, UA 06/01/2022 0 2  0 2, 1 0 E U /dl E U /dl Final    Bilirubin, UA 06/01/2022 Negative  Negative Final    Occult Blood, UA 06/01/2022 Moderate (A) Negative Final    EXT PREG TEST UR (Ref: Negative) 06/01/2022 Negative   Final    Control 06/01/2022 Valid   Final    WBC 06/01/2022 5 08  4 31 - 10 16 Thousand/uL Final    RBC 06/01/2022 3 89  3 81 - 5 12 Million/uL Final    Hemoglobin 06/01/2022 12 9  11 5 - 15 4 g/dL Final    Hematocrit 06/01/2022 36 5  34 8 - 46 1 % Final    MCV 06/01/2022 94  82 - 98 fL Final    MCH 06/01/2022 33 2  26 8 - 34 3 pg Final    MCHC 06/01/2022 35 3  31 4 - 37 4 g/dL Final    RDW 06/01/2022 12 4  11 6 - 15 1 % Final    MPV 06/01/2022 11 7  8 9 - 12 7 fL Final    Platelets 41/47/2809 268  149 - 390 Thousands/uL Final    nRBC 06/01/2022 0  /100 WBCs Final    Neutrophils Relative 06/01/2022 57  43 - 75 % Final    Immat GRANS % 06/01/2022 1  0 - 2 % Final    Lymphocytes Relative 06/01/2022 30  14 - 44 % Final    Monocytes Relative 06/01/2022 7  4 - 12 % Final    Eosinophils Relative 06/01/2022 4  0 - 6 % Final    Basophils Relative 06/01/2022 1  0 - 1 % Final    Neutrophils Absolute 06/01/2022 2 98  1 85 - 7 62 Thousands/µL Final    Immature Grans Absolute 06/01/2022 0 03  0 00 - 0 20 Thousand/uL Final    Lymphocytes Absolute 06/01/2022 1 51  0 60 - 4 47 Thousands/µL Final    Monocytes Absolute 06/01/2022 0 34  0 17 - 1 22 Thousand/µL Final    Eosinophils Absolute 06/01/2022 0 18  0 00 - 0 61 Thousand/µL Final    Basophils Absolute 06/01/2022 0 04  0 00 - 0 10 Thousands/µL Final    Sodium 06/01/2022 140  135 - 147 mmol/L Final    Potassium 06/01/2022 3 8  3 5 - 5 3 mmol/L Final    Chloride 06/01/2022 109 (A) 96 - 108 mmol/L Final    CO2 06/01/2022 24  21 - 32 mmol/L Final    ANION GAP 06/01/2022 7  4 - 13 mmol/L Final    BUN 06/01/2022 15  5 - 25 mg/dL Final    Creatinine 06/01/2022 1 07  0 60 - 1 30 mg/dL Final    Standardized to IDMS reference method    Glucose 06/01/2022 92  65 - 140 mg/dL Final    If the patient is fasting, the ADA then defines impaired fasting glucose as > 100 mg/dL and diabetes as > or equal to 123 mg/dL  Specimen collection should occur prior to Sulfasalazine administration due to the potential for falsely depressed results  Specimen collection should occur prior to Sulfapyridine administration due to the potential for falsely elevated results   Calcium 06/01/2022 9 2  8 4 - 10 2 mg/dL Final    AST 06/01/2022 11 (A) 13 - 39 U/L Final    Specimen collection should occur prior to Sulfasalazine administration due to the potential for falsely depressed results   ALT 06/01/2022 7  7 - 52 U/L Final    Specimen collection should occur prior to Sulfasalazine administration due to the potential for falsely depressed results       Alkaline Phosphatase 06/01/2022 48  34 - 104 U/L Final    Total Protein 06/01/2022 6 5 6 4 - 8 4 g/dL Final    Albumin 06/01/2022 3 9  3 5 - 5 0 g/dL Final    Total Bilirubin 06/01/2022 0 36  0 20 - 1 00 mg/dL Final    eGFR 06/01/2022 68  ml/min/1 73sq m Final    Lipase 06/01/2022 21  11 - 82 u/L Final    RBC, UA 06/01/2022 1-2  None Seen, 0-1, 1-2, 2-4, 0-5 /hpf Final    WBC, UA 06/01/2022 1-2  None Seen, 0-1, 1-2, 0-5, 2-4 /hpf Final    Epithelial Cells 06/01/2022 Occasional  None Seen, Occasional /hpf Final    Bacteria, UA 06/01/2022 Occasional  None Seen, Occasional /hpf Final    LACTIC ACID 06/01/2022 0 7  0 5 - 2 0 mmol/L Final    Sodium 06/02/2022 139  135 - 147 mmol/L Final    Potassium 06/02/2022 4 0  3 5 - 5 3 mmol/L Final    Chloride 06/02/2022 113 (A) 96 - 108 mmol/L Final    CO2 06/02/2022 21  21 - 32 mmol/L Final    ANION GAP 06/02/2022 5  4 - 13 mmol/L Final    BUN 06/02/2022 16  5 - 25 mg/dL Final    Creatinine 06/02/2022 0 99  0 60 - 1 30 mg/dL Final    Standardized to IDMS reference method    Glucose 06/02/2022 83  65 - 140 mg/dL Final    If the patient is fasting, the ADA then defines impaired fasting glucose as > 100 mg/dL and diabetes as > or equal to 123 mg/dL  Specimen collection should occur prior to Sulfasalazine administration due to the potential for falsely depressed results  Specimen collection should occur prior to Sulfapyridine administration due to the potential for falsely elevated results      Calcium 06/02/2022 8 1 (A) 8 4 - 10 2 mg/dL Final    eGFR 06/02/2022 75  ml/min/1 73sq m Final    WBC 06/02/2022 7 38  4 31 - 10 16 Thousand/uL Final    RBC 06/02/2022 3 46 (A) 3 81 - 5 12 Million/uL Final    Hemoglobin 06/02/2022 11 2 (A) 11 5 - 15 4 g/dL Final    Hematocrit 06/02/2022 33 4 (A) 34 8 - 46 1 % Final    MCV 06/02/2022 97  82 - 98 fL Final    MCH 06/02/2022 32 4  26 8 - 34 3 pg Final    MCHC 06/02/2022 33 5  31 4 - 37 4 g/dL Final    RDW 06/02/2022 12 3  11 6 - 15 1 % Final    MPV 06/02/2022 11 7  8 9 - 12 7 fL Final    Platelets 06/02/2022 222  149 - 390 Thousands/uL Final    nRBC 06/02/2022 0  /100 WBCs Final    Neutrophils Relative 06/02/2022 67  43 - 75 % Final    Immat GRANS % 06/02/2022 1  0 - 2 % Final    Lymphocytes Relative 06/02/2022 23  14 - 44 % Final    Monocytes Relative 06/02/2022 7  4 - 12 % Final    Eosinophils Relative 06/02/2022 2  0 - 6 % Final    Basophils Relative 06/02/2022 0  0 - 1 % Final    Neutrophils Absolute 06/02/2022 4 94  1 85 - 7 62 Thousands/µL Final    Immature Grans Absolute 06/02/2022 0 04  0 00 - 0 20 Thousand/uL Final    Lymphocytes Absolute 06/02/2022 1 69  0 60 - 4 47 Thousands/µL Final    Monocytes Absolute 06/02/2022 0 52  0 17 - 1 22 Thousand/µL Final    Eosinophils Absolute 06/02/2022 0 16  0 00 - 0 61 Thousand/µL Final    Basophils Absolute 06/02/2022 0 03  0 00 - 0 10 Thousands/µL Final    Urine Culture 06/01/2022 50,000-59,000 cfu/ml    Final    Mixed Contaminants X4    WBC 06/03/2022 7 01  4 31 - 10 16 Thousand/uL Final    RBC 06/03/2022 3 41 (A) 3 81 - 5 12 Million/uL Final    Hemoglobin 06/03/2022 11 3 (A) 11 5 - 15 4 g/dL Final    Hematocrit 06/03/2022 32 8 (A) 34 8 - 46 1 % Final    MCV 06/03/2022 96  82 - 98 fL Final    MCH 06/03/2022 33 1  26 8 - 34 3 pg Final    MCHC 06/03/2022 34 5  31 4 - 37 4 g/dL Final    RDW 06/03/2022 12 2  11 6 - 15 1 % Final    Platelets 08/46/8952 237  149 - 390 Thousands/uL Final    MPV 06/03/2022 12 2  8 9 - 12 7 fL Final    Sodium 06/03/2022 139  135 - 147 mmol/L Final    Potassium 06/03/2022 4 0  3 5 - 5 3 mmol/L Final    Chloride 06/03/2022 111 (A) 96 - 108 mmol/L Final    CO2 06/03/2022 24  21 - 32 mmol/L Final    ANION GAP 06/03/2022 4  4 - 13 mmol/L Final    BUN 06/03/2022 10  5 - 25 mg/dL Final    Creatinine 06/03/2022 0 86  0 60 - 1 30 mg/dL Final    Standardized to IDMS reference method    Glucose 06/03/2022 103  65 - 140 mg/dL Final    If the patient is fasting, the ADA then defines impaired fasting glucose as > 100 mg/dL and diabetes as > or equal to 123 mg/dL  Specimen collection should occur prior to Sulfasalazine administration due to the potential for falsely depressed results  Specimen collection should occur prior to Sulfapyridine administration due to the potential for falsely elevated results      Calcium 06/03/2022 8 6  8 4 - 10 2 mg/dL Final    eGFR 06/03/2022 89  ml/min/1 73sq m Final   Admission on 05/30/2022, Discharged on 05/30/2022   Component Date Value Ref Range Status    WBC 05/30/2022 7 21  4 31 - 10 16 Thousand/uL Final    RBC 05/30/2022 4 26  3 81 - 5 12 Million/uL Final    Hemoglobin 05/30/2022 13 9  11 5 - 15 4 g/dL Final    Hematocrit 05/30/2022 40 1  34 8 - 46 1 % Final    MCV 05/30/2022 94  82 - 98 fL Final    MCH 05/30/2022 32 6  26 8 - 34 3 pg Final    MCHC 05/30/2022 34 7  31 4 - 37 4 g/dL Final    RDW 05/30/2022 12 6  11 6 - 15 1 % Final    MPV 05/30/2022 12 0  8 9 - 12 7 fL Final    Platelets 27/31/8022 262  149 - 390 Thousands/uL Final    nRBC 05/30/2022 0  /100 WBCs Final    Neutrophils Relative 05/30/2022 79 (A) 43 - 75 % Final    Immat GRANS % 05/30/2022 0  0 - 2 % Final    Lymphocytes Relative 05/30/2022 15  14 - 44 % Final    Monocytes Relative 05/30/2022 4  4 - 12 % Final    Eosinophils Relative 05/30/2022 1  0 - 6 % Final    Basophils Relative 05/30/2022 1  0 - 1 % Final    Neutrophils Absolute 05/30/2022 5 71  1 85 - 7 62 Thousands/µL Final    Immature Grans Absolute 05/30/2022 0 03  0 00 - 0 20 Thousand/uL Final    Lymphocytes Absolute 05/30/2022 1 06  0 60 - 4 47 Thousands/µL Final    Monocytes Absolute 05/30/2022 0 29  0 17 - 1 22 Thousand/µL Final    Eosinophils Absolute 05/30/2022 0 08  0 00 - 0 61 Thousand/µL Final    Basophils Absolute 05/30/2022 0 04  0 00 - 0 10 Thousands/µL Final    Sodium 05/30/2022 141  135 - 147 mmol/L Final    Potassium 05/30/2022 3 6  3 5 - 5 3 mmol/L Final    Chloride 05/30/2022 111 (A) 96 - 108 mmol/L Final    CO2 05/30/2022 23  21 - 32 mmol/L Final    ANION GAP 05/30/2022 7  4 - 13 mmol/L Final    BUN 05/30/2022 19  5 - 25 mg/dL Final    Creatinine 05/30/2022 1 04  0 60 - 1 30 mg/dL Final    Standardized to IDMS reference method    Glucose 05/30/2022 107  65 - 140 mg/dL Final    If the patient is fasting, the ADA then defines impaired fasting glucose as > 100 mg/dL and diabetes as > or equal to 123 mg/dL  Specimen collection should occur prior to Sulfasalazine administration due to the potential for falsely depressed results  Specimen collection should occur prior to Sulfapyridine administration due to the potential for falsely elevated results   Calcium 05/30/2022 9 1  8 4 - 10 2 mg/dL Final    AST 05/30/2022 16  13 - 39 U/L Final    Specimen collection should occur prior to Sulfasalazine administration due to the potential for falsely depressed results   ALT 05/30/2022 10  7 - 52 U/L Final    Specimen collection should occur prior to Sulfasalazine administration due to the potential for falsely depressed results       Alkaline Phosphatase 05/30/2022 49  34 - 104 U/L Final    Total Protein 05/30/2022 6 9  6 4 - 8 4 g/dL Final    Albumin 05/30/2022 4 4  3 5 - 5 0 g/dL Final    Total Bilirubin 05/30/2022 0 52  0 20 - 1 00 mg/dL Final    eGFR 05/30/2022 71  ml/min/1 73sq m Final    Lipase 05/30/2022 15  11 - 82 u/L Final    Color, UA 05/30/2022 Yellow   Final    Clarity, UA 05/30/2022 Slightly Cloudy   Final    Specific Gravity, UA 05/30/2022 >=1 030  1 003 - 1 030 Final    pH, UA 05/30/2022 7 5  4 5, 5 0, 5 5, 6 0, 6 5, 7 0, 7 5, 8 0 Final    Leukocytes, UA 05/30/2022 Negative  Negative Final    Nitrite, UA 05/30/2022 Negative  Negative Final    Protein, UA 05/30/2022 Negative  Negative mg/dl Final    Glucose, UA 05/30/2022 Negative  Negative mg/dl Final    Ketones, UA 05/30/2022 15 (1+) (A) Negative mg/dl Final    Urobilinogen, UA 05/30/2022 1 0  0 2, 1 0 E U /dl E U /dl Final    Bilirubin, UA 05/30/2022 Negative  Negative Final    Occult Blood, UA 05/30/2022 Small (A) Negative Final    EXT PREG TEST UR (Ref: Negative) 05/30/2022 negative   Final    Control 05/30/2022 valid   Final    RBC, UA 05/30/2022 0-1  None Seen, 0-1, 1-2, 2-4, 0-5 /hpf Final    WBC, UA 05/30/2022 None Seen  None Seen, 0-1, 1-2, 0-5, 2-4 /hpf Final    Epithelial Cells 05/30/2022 Occasional  None Seen, Occasional /hpf Final    Bacteria, UA 05/30/2022 Occasional  None Seen, Occasional /hpf Final    AMORPH URATES 05/30/2022 Moderate  /hpf Final    MUCUS THREADS 05/30/2022 Occasional (A) None Seen Final   Admission on 04/08/2022, Discharged on 04/08/2022   Component Date Value Ref Range Status    WBC 04/08/2022 4 88  4 31 - 10 16 Thousand/uL Final    RBC 04/08/2022 4 31  3 81 - 5 12 Million/uL Final    Hemoglobin 04/08/2022 13 8  11 5 - 15 4 g/dL Final    Hematocrit 04/08/2022 40 4  34 8 - 46 1 % Final    MCV 04/08/2022 94  82 - 98 fL Final    MCH 04/08/2022 32 0  26 8 - 34 3 pg Final    MCHC 04/08/2022 34 2  31 4 - 37 4 g/dL Final    RDW 04/08/2022 10 9 (A) 11 6 - 15 1 % Final    MPV 04/08/2022 11 6  8 9 - 12 7 fL Final    Platelets 65/46/5992 304  149 - 390 Thousands/uL Final    nRBC 04/08/2022 0  /100 WBCs Final    Neutrophils Relative 04/08/2022 52  43 - 75 % Final    Immat GRANS % 04/08/2022 0  0 - 2 % Final    Lymphocytes Relative 04/08/2022 38  14 - 44 % Final    Monocytes Relative 04/08/2022 6  4 - 12 % Final    Eosinophils Relative 04/08/2022 3  0 - 6 % Final    Basophils Relative 04/08/2022 1  0 - 1 % Final    Neutrophils Absolute 04/08/2022 2 52  1 85 - 7 62 Thousands/µL Final    Immature Grans Absolute 04/08/2022 0 01  0 00 - 0 20 Thousand/uL Final    Lymphocytes Absolute 04/08/2022 1 87  0 60 - 4 47 Thousands/µL Final    Monocytes Absolute 04/08/2022 0 31  0 17 - 1 22 Thousand/µL Final    Eosinophils Absolute 04/08/2022 0 13  0 00 - 0 61 Thousand/µL Final    Basophils Absolute 04/08/2022 0 04  0 00 - 0 10 Thousands/µL Final    Sodium 04/08/2022 138  136 - 145 mmol/L Final    Potassium 04/08/2022 3 5  3 5 - 5 3 mmol/L Final    Chloride 04/08/2022 103  100 - 108 mmol/L Final    CO2 04/08/2022 24  21 - 32 mmol/L Final    ANION GAP 04/08/2022 11  4 - 13 mmol/L Final    BUN 04/08/2022 18  5 - 25 mg/dL Final    Creatinine 04/08/2022 1 08  0 60 - 1 30 mg/dL Final    Standardized to IDMS reference method    Glucose 04/08/2022 83  65 - 140 mg/dL Final    If the patient is fasting, the ADA then defines impaired fasting glucose as > 100 mg/dL and diabetes as > or equal to 123 mg/dL  Specimen collection should occur prior to Sulfasalazine administration due to the potential for falsely depressed results  Specimen collection should occur prior to Sulfapyridine administration due to the potential for falsely elevated results   Calcium 04/08/2022 8 7  8 3 - 10 1 mg/dL Final    AST 04/08/2022 13  5 - 45 U/L Final    Specimen collection should occur prior to Sulfasalazine administration due to the potential for falsely depressed results   ALT 04/08/2022 15  12 - 78 U/L Final    Specimen collection should occur prior to Sulfasalazine administration due to the potential for falsely depressed results   Alkaline Phosphatase 04/08/2022 44 (A) 46 - 116 U/L Final    Total Protein 04/08/2022 7 3  6 4 - 8 2 g/dL Final    Albumin 04/08/2022 4 0  3 5 - 5 0 g/dL Final    Total Bilirubin 04/08/2022 0 28  0 20 - 1 00 mg/dL Final    Use of this assay is not recommended for patients undergoing treatment with eltrombopag due to the potential for falsely elevated results      eGFR 04/08/2022 68  ml/min/1 73sq m Final    Lipase 04/08/2022 108  73 - 393 u/L Final    Color, UA 04/08/2022 Yellow   Final    Clarity, UA 04/08/2022 Clear   Final    Specific Gravity, UA 04/08/2022 >=1 030  1 003 - 1 030 Final    pH, UA 04/08/2022 6 0  4 5, 5 0, 5 5, 6 0, 6 5, 7 0, 7 5, 8 0 Final    Leukocytes, UA 04/08/2022 Trace (A) Negative Final    Nitrite, UA 04/08/2022 Negative  Negative Final    Protein, UA 04/08/2022 Negative  Negative mg/dl Final    Glucose, UA 04/08/2022 Negative  Negative mg/dl Final    Ketones, UA 04/08/2022 Trace (A) Negative mg/dl Final    Urobilinogen, UA 04/08/2022 0 2  0 2, 1 0 E U /dl E U /dl Final    Bilirubin, UA 04/08/2022 Negative  Negative Final    Occult Blood, UA 04/08/2022 Trace-lysed (A) Negative Final    EXT PREG TEST UR (Ref: Negative) 04/08/2022 negative   Final    Control 04/08/2022 valid   Final    RBC, UA 04/08/2022 2-4  None Seen, 0-1, 1-2, 2-4, 0-5 /hpf Final    WBC, UA 04/08/2022 1-2  None Seen, 0-1, 1-2, 0-5, 2-4 /hpf Final    Epithelial Cells 04/08/2022 Occasional  None Seen, Occasional /hpf Final    Bacteria, UA 04/08/2022 Innumerable (A) None Seen, Occasional /hpf Final    MUCUS THREADS 04/08/2022 Moderate (A) None Seen Final   Office Visit on 02/07/2022   Component Date Value Ref Range Status    Yeast, Wet Prep 02/07/2022 none   Final    Whiff Test 02/07/2022 positive   Final    Clue Cells 02/07/2022 present   Final    Trich, Wet Prep 02/07/2022 none   Final   Office Visit on 02/04/2022   Component Date Value Ref Range Status    WBC 02/04/2022 8 51  4 31 - 10 16 Thousand/uL Final    RBC 02/04/2022 3 91  3 81 - 5 12 Million/uL Final    Hemoglobin 02/04/2022 12 7  11 5 - 15 4 g/dL Final    Hematocrit 02/04/2022 36 9  34 8 - 46 1 % Final    MCV 02/04/2022 94  82 - 98 fL Final    MCH 02/04/2022 32 5  26 8 - 34 3 pg Final    MCHC 02/04/2022 34 4  31 4 - 37 4 g/dL Final    RDW 02/04/2022 11 9  11 6 - 15 1 % Final    MPV 02/04/2022 11 5  8 9 - 12 7 fL Final    Platelets 18/46/6088 247  149 - 390 Thousands/uL Final    nRBC 02/04/2022 0  /100 WBCs Final    Neutrophils Relative 02/04/2022 63  43 - 75 % Final    Immat GRANS % 02/04/2022 1  0 - 2 % Final    Lymphocytes Relative 02/04/2022 27  14 - 44 % Final    Monocytes Relative 02/04/2022 6  4 - 12 % Final    Eosinophils Relative 02/04/2022 2  0 - 6 % Final    Basophils Relative 02/04/2022 1  0 - 1 % Final    Neutrophils Absolute 02/04/2022 5 48  1 85 - 7 62 Thousands/µL Final    Immature Grans Absolute 02/04/2022 0 07  0 00 - 0 20 Thousand/uL Final    Lymphocytes Absolute 02/04/2022 2 29  0 60 - 4 47 Thousands/µL Final    Monocytes Absolute 02/04/2022 0 47  0 17 - 1 22 Thousand/µL Final    Eosinophils Absolute 02/04/2022 0 13  0 00 - 0 61 Thousand/µL Final    Basophils Absolute 02/04/2022 0 07  0 00 - 0 10 Thousands/µL Final    Sodium 02/04/2022 139  136 - 145 mmol/L Final    Potassium 02/04/2022 3 9  3 5 - 5 3 mmol/L Final    Chloride 02/04/2022 111 (A) 100 - 108 mmol/L Final    CO2 02/04/2022 24  21 - 32 mmol/L Final    ANION GAP 02/04/2022 4  4 - 13 mmol/L Final    BUN 02/04/2022 20  5 - 25 mg/dL Final    Creatinine 02/04/2022 0 84  0 60 - 1 30 mg/dL Final    Standardized to IDMS reference method    Glucose 02/04/2022 90  65 - 140 mg/dL Final    If the patient is fasting, the ADA then defines impaired fasting glucose as > 100 mg/dL and diabetes as > or equal to 123 mg/dL  Specimen collection should occur prior to Sulfasalazine administration due to the potential for falsely depressed results  Specimen collection should occur prior to Sulfapyridine administration due to the potential for falsely elevated results   Calcium 02/04/2022 9 2  8 3 - 10 1 mg/dL Final    AST 02/04/2022 11  5 - 45 U/L Final    Specimen collection should occur prior to Sulfasalazine administration due to the potential for falsely depressed results   ALT 02/04/2022 18  12 - 78 U/L Final    Specimen collection should occur prior to Sulfasalazine and/or Sulfapyridine administration due to the potential for falsely depressed results       Alkaline Phosphatase 02/04/2022 47  46 - 116 U/L Final    Total Protein 02/04/2022 7 4  6 4 - 8 2 g/dL Final    Albumin 02/04/2022 4 2  3 5 - 5 0 g/dL Final    Total Bilirubin 02/04/2022 0  97  0 20 - 1 00 mg/dL Final    Use of this assay is not recommended for patients undergoing treatment with eltrombopag due to the potential for falsely elevated results      eGFR 02/04/2022 92  ml/min/1 73sq m Final    CRP 02/04/2022 <3 0  <3 0 mg/L Final    C4, COMPLEMENT 02/04/2022 23 0  10 0 - 40 0 mg/dL Final    C3 Complement 02/04/2022 96 9  90 0 - 180 0 mg/dL Final    ds DNA Ab 02/04/2022 1  0 - 9 IU/mL Final                                       Negative      <5                                     Equivocal  5 - 9                                     Positive      >9    Clarity, UA 02/04/2022 Clear   Final    Color, UA 02/04/2022 Yellow   Final    Specific Gravity, UA 02/04/2022 1 025  1 003 - 1 030 Final    pH, UA 02/04/2022 6 0  4 5, 5 0, 5 5, 6 0, 6 5, 7 0, 7 5, 8 0 Final    Glucose, UA 02/04/2022 Negative  Negative mg/dl Final    Ketones, UA 02/04/2022 Negative  Negative mg/dl Final    Occult Blood, UA 02/04/2022 Negative   Final    Protein, UA 02/04/2022 Negative  Negative mg/dl Final    Nitrite, UA 02/04/2022 Negative  Negative Final    Bilirubin, UA 02/04/2022 Negative  Negative Final    Urobilinogen, UA 02/04/2022 0 2  0 2, 1 0 E U /dl E U /dl Final    Leukocytes, UA 02/04/2022 Negative  Negative Final    WBC, UA 02/04/2022 2-4  None Seen, 2-4, 5-60 /hpf Final    RBC, UA 02/04/2022 2-4  None Seen, 2-4 /hpf Final    Hyaline Casts, UA 02/04/2022 None Seen  None Seen /lpf Final    Bacteria, UA 02/04/2022 Occasional  None Seen, Occasional /hpf Final    Epithelial Cells 02/04/2022 None Seen  None Seen, Occasional /hpf Final    Sed Rate 02/04/2022 <1  0 - 19 mm/hour Final    Creatinine, Ur 02/04/2022 106 0  mg/dL Final    Protein Urine Random 02/04/2022 15  mg/dL Final    Prot/Creat Ratio, Ur 02/04/2022 0 14 (A) 0 00 - 0 10 Final   Orders Only on 11/02/2021   Component Date Value Ref Range Status    SARS-CoV-2 11/02/2021 Negative  Negative Final   Office Visit on 10/22/2021 Component Date Value Ref Range Status    EBV VCA IgG 02/04/2022 377 0 (A) 0 0 - 17 9 U/mL Final                                     Negative        <18 0                                   Equivocal 18 0 - 21 9                                   Positive        >21 9    EBV VCA IgM 02/04/2022 <36 0  0 0 - 35 9 U/mL Final                                     Negative        <36 0                                   Equivocal 36 0 - 43 9                                   Positive        >43 9    EBV Nuclear Ag Ab 02/04/2022 <18 0  0 0 - 17 9 U/mL Final                                     Negative        <18 0                                   Equivocal 18 0 - 21 9                                   Positive        >21 9    INTERPRETATION 02/04/2022 Comment   Final                   EBV Interpretation Chart  Key: Antibody Present +    Antibody Absent -  Interpretation             VCA-IgM   VCA-IgG  EBNA-IgG  No previous infection/        -         -         -  Susceptible  Primary infection (new        +         +         -  or recent)  Past Infection               +or-       +         +  See comment below*            +         -         -  *Results indicate infection with EBV at some time   however cannot predict the timing of the infection   since antibodies to EBNA usually develop after   primary infection or, alternatively, approximately   5-10% of patients with EBV never develop antibodies   to EBNA  Lab on 10/07/2021   Component Date Value Ref Range Status    TSH 3RD GENERATON 10/07/2021 1 480  0 358 - 3 740 uIU/mL Final    The recommended reference ranges for TSH during pregnancy are as follows:   First trimester 0 1 to 2 5 uIU/mL   Second trimester  0 2 to 3 0 uIU/mL   Third trimester 0 3 to 3 0 uIU/m    Note: Normal ranges may not apply to patients who are transgender, non-binary, or whose legal sex, sex at birth, and gender identity differ     Orders Only on 10/07/2021   Component Date Value Ref Range Status    WBC 10/07/2021 6 07  4 31 - 10 16 Thousand/uL Final    RBC 10/07/2021 4 14  3 81 - 5 12 Million/uL Final    Hemoglobin 10/07/2021 12 7  11 5 - 15 4 g/dL Final    Hematocrit 10/07/2021 38 3  34 8 - 46 1 % Final    MCV 10/07/2021 93  82 - 98 fL Final    MCH 10/07/2021 30 7  26 8 - 34 3 pg Final    MCHC 10/07/2021 33 2  31 4 - 37 4 g/dL Final    RDW 10/07/2021 11 5 (A) 11 6 - 15 1 % Final    MPV 10/07/2021 12 4  8 9 - 12 7 fL Final    Platelets 48/91/2253 210  149 - 390 Thousands/uL Final    nRBC 10/07/2021 0  /100 WBCs Final    Neutrophils Relative 10/07/2021 65  43 - 75 % Final    Immat GRANS % 10/07/2021 1  0 - 2 % Final    Lymphocytes Relative 10/07/2021 25  14 - 44 % Final    Monocytes Relative 10/07/2021 5  4 - 12 % Final    Eosinophils Relative 10/07/2021 3  0 - 6 % Final    Basophils Relative 10/07/2021 1  0 - 1 % Final    Neutrophils Absolute 10/07/2021 3 93  1 85 - 7 62 Thousands/µL Final    Immature Grans Absolute 10/07/2021 0 07  0 00 - 0 20 Thousand/uL Final    Lymphocytes Absolute 10/07/2021 1 52  0 60 - 4 47 Thousands/µL Final    Monocytes Absolute 10/07/2021 0 32  0 17 - 1 22 Thousand/µL Final    Eosinophils Absolute 10/07/2021 0 16  0 00 - 0 61 Thousand/µL Final    Basophils Absolute 10/07/2021 0 07  0 00 - 0 10 Thousands/µL Final    Sodium 10/07/2021 139  136 - 145 mmol/L Final    Potassium 10/07/2021 4 2  3 5 - 5 3 mmol/L Final    Chloride 10/07/2021 112 (A) 100 - 108 mmol/L Final    CO2 10/07/2021 24  21 - 32 mmol/L Final    ANION GAP 10/07/2021 3 (A) 4 - 13 mmol/L Final    BUN 10/07/2021 12  5 - 25 mg/dL Final    Creatinine 10/07/2021 0 95  0 60 - 1 30 mg/dL Final    Standardized to IDMS reference method    Glucose, Fasting 10/07/2021 94  65 - 99 mg/dL Final    Specimen collection should occur prior to Sulfasalazine administration due to the potential for falsely depressed results   Specimen collection should occur prior to Sulfapyridine administration due to the potential for falsely elevated results   Calcium 10/07/2021 9 3  8 3 - 10 1 mg/dL Final    AST 10/07/2021 6  5 - 45 U/L Final    Specimen collection should occur prior to Sulfasalazine administration due to the potential for falsely depressed results   ALT 10/07/2021 13  12 - 78 U/L Final    Specimen collection should occur prior to Sulfasalazine and/or Sulfapyridine administration due to the potential for falsely depressed results   Alkaline Phosphatase 10/07/2021 46  46 - 116 U/L Final    Total Protein 10/07/2021 6 7  6 4 - 8 2 g/dL Final    Albumin 10/07/2021 3 5  3 5 - 5 0 g/dL Final    Total Bilirubin 10/07/2021 0 28  0 20 - 1 00 mg/dL Final    Use of this assay is not recommended for patients undergoing treatment with eltrombopag due to the potential for falsely elevated results      eGFR 10/07/2021 80  ml/min/1 73sq m Final    CRP 10/07/2021 <3 0  <3 0 mg/L Final    Sed Rate 10/07/2021 <1  0 - 19 mm/hour Final    Creatinine, Ur 10/07/2021 32 8  mg/dL Final    Protein Urine Random 10/07/2021 6  mg/dL Final    Prot/Creat Ratio, Ur 10/07/2021 0 18 (A) 0 00 - 0 10 Final    Clarity, UA 10/07/2021 Clear   Final    Color, UA 10/07/2021 Yellow   Final    Specific [place], UA 10/07/2021 1 008  1 003 - 1 030 Final    pH, UA 10/07/2021 7 5  4 5, 5 0, 5 5, 6 0, 6 5, 7 0, 7 5, 8 0 Final    Glucose, UA 10/07/2021 Negative  Negative mg/dl Final    Ketones, UA 10/07/2021 Negative  Negative mg/dl Final    Occult Blood, UA 10/07/2021 Negative  Negative Final    Protein, UA 10/07/2021 Negative  Negative mg/dl Final    Nitrite, UA 10/07/2021 Negative  Negative Final    Bilirubin, UA 10/07/2021 Negative  Negative Final    Urobilinogen, UA 10/07/2021 0 2  0 2, 1 0 E U /dl E U /dl Final    Leukocytes, UA 10/07/2021 Negative  Negative Final    WBC, UA 10/07/2021 None Seen  None Seen, 2-4, 5-60 /hpf Final    RBC, UA 10/07/2021 None Seen  None Seen, 2-4 /hpf Final    Hyaline Casts, UA 10/07/2021 None Seen  None Seen /lpf Final    Bacteria, UA 10/07/2021 Occasional  None Seen, Occasional /hpf Final    Epithelial Cells 10/07/2021 None Seen  None Seen, Occasional /hpf Final    C4, COMPLEMENT 10/07/2021 18 0  10 0 - 40 0 mg/dL Final    C3 Complement 10/07/2021 94 4  90 0 - 180 0 mg/dL Final    ds DNA Ab 10/07/2021 1  0 - 9 IU/mL Final                                       Negative      <5                                     Equivocal  5 - 9                                     Positive      >9   There may be more visits with results that are not included

## 2022-06-24 ENCOUNTER — OFFICE VISIT (OUTPATIENT)
Dept: RHEUMATOLOGY | Facility: CLINIC | Age: 32
End: 2022-06-24
Payer: COMMERCIAL

## 2022-06-24 VITALS
SYSTOLIC BLOOD PRESSURE: 108 MMHG | OXYGEN SATURATION: 99 % | HEART RATE: 101 BPM | BODY MASS INDEX: 22.58 KG/M2 | DIASTOLIC BLOOD PRESSURE: 72 MMHG | WEIGHT: 115 LBS | HEIGHT: 60 IN | TEMPERATURE: 97.2 F

## 2022-06-24 DIAGNOSIS — M35.9 UNDIFFERENTIATED CONNECTIVE TISSUE DISEASE (HCC): ICD-10-CM

## 2022-06-24 DIAGNOSIS — R13.10 DYSPHAGIA, UNSPECIFIED TYPE: ICD-10-CM

## 2022-06-24 DIAGNOSIS — M32.19 SYSTEMIC LUPUS ERYTHEMATOSUS WITH OTHER ORGAN INVOLVEMENT, UNSPECIFIED SLE TYPE (HCC): Primary | ICD-10-CM

## 2022-06-24 DIAGNOSIS — Z79.899 HIGH RISK MEDICATION USE: ICD-10-CM

## 2022-06-24 DIAGNOSIS — M25.50 ARTHRALGIA, UNSPECIFIED JOINT: ICD-10-CM

## 2022-06-24 PROCEDURE — 99215 OFFICE O/P EST HI 40 MIN: CPT | Performed by: INTERNAL MEDICINE

## 2022-06-24 RX ORDER — AZATHIOPRINE 50 MG/1
50 TABLET ORAL 2 TIMES DAILY
Qty: 180 TABLET | Refills: 1 | Status: SHIPPED | OUTPATIENT
Start: 2022-06-24

## 2022-06-24 RX ORDER — OMEPRAZOLE 20 MG/1
20 CAPSULE, DELAYED RELEASE ORAL DAILY
Qty: 30 CAPSULE | Refills: 6 | Status: SHIPPED | OUTPATIENT
Start: 2022-06-24

## 2022-06-24 RX ORDER — MELOXICAM 15 MG/1
15 TABLET ORAL DAILY
Qty: 90 TABLET | Refills: 1 | Status: SHIPPED | OUTPATIENT
Start: 2022-06-24

## 2022-06-24 RX ORDER — HYDROXYCHLOROQUINE SULFATE 200 MG/1
200 TABLET, FILM COATED ORAL DAILY
Qty: 90 TABLET | Refills: 1 | Status: SHIPPED | OUTPATIENT
Start: 2022-06-24 | End: 2022-12-21

## 2022-06-24 NOTE — PATIENT INSTRUCTIONS
Can take famotidine 1-2 times a day or PPI for GI prophylaxis while on regular NSAIDs  Continue meloxicam daily as needed for joint pain  Continue 200mg po daily hydroxychloroquine and regular eye exams  Continue azathioprine 1 tab twice a day  Do lab    Return to clinic in 6 months

## 2025-01-08 NOTE — OP NOTE
OPERATIVE REPORT  PATIENT NAME: Talia Marquez    :  1990  MRN: [id number]  Pt Location: [facility] OR ROOM 01    SURGERY DATE: 2022    Surgeon(s) and Role:     * Geovany Brooks MD - Primary    Preop Diagnosis:  Left ureteral stone    Post-Op Diagnosis Codes:  No left ureteral stone  Left renal system with clot    Procedure(s) (LRB):  CYSTOSCOPY,URETEROSCOPY, RETROGRADE PYELOGRAM AND INSERTION STENT URETERAL (Left)    Specimen(s):  * No specimens in log *    Estimated Blood Loss:   Minimal    Drains:  Ureteral Internal Stent Left ureter 6 Fr  (Active)   Number of days: 0       Anesthesia Type:   Choice    Operative Indications:  Patient with distal left ureteral stone causing pain    Operative Findings:  Small fragment of stone seen in the bladder  Left intramural ureter edematous  No stone found in ureter with both semi-rigid and flexible ureteroscopy  Collecting system explored but limited visualization because of large organized clot burden carpeting much of surface area  Attempt made to allow clot but not successful  No obvious stone found in collecting system    Complications:   None    Procedure and Technique:    After informed consent including the risks of bleeding, infection, ureteral injury, and need for secondary procedures, patient was placed supine in the operating room theater  Gen  anesthesia was administered  They were then placed in the dorsal lithotomy position and sterilely prepped and draped in usual fashion  Antibiotic prophylaxis and DVT prophylaxis were administered Cystoscopy was performed the 21 [place] cystoscope 30° lens  This revealed a normal urethra  Inspection of the bladder revealed a 1 mm fragment of yellow stone and edema of the left intramural ureter  Fluoroscopy did show evidence of a stone in left ureter  Attention was turned to the left ureteral orifice  A 5fr open ended catheter was attempted to be passed into the ureteral orifice   A retrograde ureteropyelogram was Recall.   performed showing mild hydroureter  Then a 0 38 solo guidewire was passed through the open ended catheter and up the ureter into the renal pelvis  The scope was removed  A semi-rigid ureteral scope was passed into the left ureteral orifice no stone was seen  There was an area if the distal ureter which mucosa appeared irritated likely from prior stone location  The semi-rigid ureteral scope was easily passed up to the proximal ureter no stone was found  Could not pass beyond this because of tortuosity of ureter  At this point it seems most likely the patient passed ureteral stone  To be safe elected to perform flexible ureteroscopy and pyeloscopy  A second solo wire was passed up the ureter into the renal pelvis  An 10/12 access sheath was then sequentially placed (inner sheath and then inner + outer sheath) over one of the wires under fluoroscopic guidance with minimal resistance  A 5 3 flexible ureteroscope was then passed through the access sheath  There was tortuosity and folds of the proximal ureter which was hard to navigate the that the aid of a wire but with this able to enter the collecting system  The collecting system was examined and there was organized clot carpeting the upper and lower poles of what almost appeared to be a bifid system  Based on the patient's prior CT scan there was a lower pole stone and I attempted the surgery this but could not find it within the clot  A basket was used to attempt to pull out clot burden but only able to remove a small portion with a few attempts and as was not easy to access the collecting system because of proximal ureter tortuosity/folds decided it was not prudent to continue      A retrograde was performed from mid ureter showing tortuosity of proximal ureter but without extravasation of contrast      The ureteroscope was backed down the ureter under vision and he ureter was noted to be intact with no injury and again mild edema where the stone was located in the distal ureter  A 6 x 24 double-J stent inserted without difficulty with good coil seen in left collecting system on fluoroscopy and then visually in the bladder when the scope was reintroduced  The string was left on and secured to left inner thigh with benzoin and tegaderm  The bladder was drained  The patient was placed back supine, awakened from general anesthesia and brought to recovery room in stable condition  A qualified resident physician was not available    Patient Disposition:  PACU     PLAN:  Patient can remove her stent at home in 4 days on Monday June 6th  If she is not comfortable doing this we will arrange a clinic visit    She will follow-up in the urology clinic with a KUB and ultrasound for 6 weeks later    SIGNATURE: Nuno Rizo MD  DATE: June 2, 2022  TIME: 2:51 PM

## (undated) DEVICE — INVIEW CLEAR LEGGINGS: Brand: CONVERTORS

## (undated) DEVICE — CATH URETERAL 5FR X 70 CM FLEX TIP POLYUR BARD

## (undated) DEVICE — SHEATH URETERAL ACCESS 10/12FR 35CM PROXIS

## (undated) DEVICE — BASKET SPECIMEN RETRIVAL 1.9FR 120CM

## (undated) DEVICE — STERILE SURGICAL LUBRICANT,  TUBE: Brand: SURGILUBE

## (undated) DEVICE — SPECIMEN CONTAINER STERILE PEEL PACK

## (undated) DEVICE — CHLORHEXIDINE 4PCT 4 OZ

## (undated) DEVICE — PREMIUM DRY TRAY LF: Brand: MEDLINE INDUSTRIES, INC.

## (undated) DEVICE — GLOVE SRG BIOGEL 7.5

## (undated) DEVICE — GUIDEWIRE STRGHT TIP 0.035 IN  SOLO PLUS

## (undated) DEVICE — PACK TUR

## (undated) DEVICE — UROCATCH BAG

## (undated) DEVICE — 3M™ TEGADERM™ TRANSPARENT FILM DRESSING FRAME STYLE, 1626W, 4 IN X 4-3/4 IN (10 CM X 12 CM), 50/CT 4CT/CASE: Brand: 3M™ TEGADERM™